# Patient Record
Sex: FEMALE | Race: BLACK OR AFRICAN AMERICAN | NOT HISPANIC OR LATINO | URBAN - METROPOLITAN AREA
[De-identification: names, ages, dates, MRNs, and addresses within clinical notes are randomized per-mention and may not be internally consistent; named-entity substitution may affect disease eponyms.]

---

## 2017-03-29 ENCOUNTER — INPATIENT (INPATIENT)
Facility: HOSPITAL | Age: 47
LOS: 5 days | Discharge: LEFT BEFORE DISCHARGE | DRG: 66 | End: 2017-04-04
Attending: HOSPITALIST | Admitting: HOSPITALIST
Payer: COMMERCIAL

## 2017-03-29 VITALS
TEMPERATURE: 99 F | RESPIRATION RATE: 20 BRPM | OXYGEN SATURATION: 99 % | DIASTOLIC BLOOD PRESSURE: 86 MMHG | SYSTOLIC BLOOD PRESSURE: 179 MMHG | HEART RATE: 79 BPM | HEIGHT: 67 IN | WEIGHT: 207.01 LBS

## 2017-03-29 LAB
ALBUMIN SERPL ELPH-MCNC: 4.2 G/DL — SIGNIFICANT CHANGE UP (ref 3.3–5.2)
ALP SERPL-CCNC: 75 U/L — SIGNIFICANT CHANGE UP (ref 40–120)
ALT FLD-CCNC: 13 U/L — SIGNIFICANT CHANGE UP
ANION GAP SERPL CALC-SCNC: 11 MMOL/L — SIGNIFICANT CHANGE UP (ref 5–17)
ANISOCYTOSIS BLD QL: SLIGHT — SIGNIFICANT CHANGE UP
AST SERPL-CCNC: 12 U/L — SIGNIFICANT CHANGE UP
BASOPHILS # BLD AUTO: 0 K/UL — SIGNIFICANT CHANGE UP (ref 0–0.2)
BASOPHILS NFR BLD AUTO: 0.2 % — SIGNIFICANT CHANGE UP (ref 0–2)
BILIRUB SERPL-MCNC: 0.2 MG/DL — LOW (ref 0.4–2)
BUN SERPL-MCNC: 12 MG/DL — SIGNIFICANT CHANGE UP (ref 8–20)
CALCIUM SERPL-MCNC: 9.4 MG/DL — SIGNIFICANT CHANGE UP (ref 8.6–10.2)
CHLORIDE SERPL-SCNC: 97 MMOL/L — LOW (ref 98–107)
CO2 SERPL-SCNC: 26 MMOL/L — SIGNIFICANT CHANGE UP (ref 22–29)
CREAT SERPL-MCNC: 0.53 MG/DL — SIGNIFICANT CHANGE UP (ref 0.5–1.3)
DACRYOCYTES BLD QL SMEAR: SLIGHT — SIGNIFICANT CHANGE UP
ELLIPTOCYTES BLD QL SMEAR: SLIGHT — SIGNIFICANT CHANGE UP
EOSINOPHIL # BLD AUTO: 0.2 K/UL — SIGNIFICANT CHANGE UP (ref 0–0.5)
EOSINOPHIL NFR BLD AUTO: 2.5 % — SIGNIFICANT CHANGE UP (ref 0–6)
ETHANOL SERPL-MCNC: <10 MG/DL — SIGNIFICANT CHANGE UP
GLUCOSE SERPL-MCNC: 282 MG/DL — HIGH (ref 70–115)
HCT VFR BLD CALC: 33 % — LOW (ref 37–47)
HGB BLD-MCNC: 9.1 G/DL — LOW (ref 12–16)
HYPOCHROMIA BLD QL: SLIGHT — SIGNIFICANT CHANGE UP
LYMPHOCYTES # BLD AUTO: 2.4 K/UL — SIGNIFICANT CHANGE UP (ref 1–4.8)
LYMPHOCYTES # BLD AUTO: 29.7 % — SIGNIFICANT CHANGE UP (ref 20–55)
MACROCYTES BLD QL: SLIGHT — SIGNIFICANT CHANGE UP
MCHC RBC-ENTMCNC: 19 PG — LOW (ref 27–31)
MCHC RBC-ENTMCNC: 27.6 G/DL — LOW (ref 32–36)
MCV RBC AUTO: 68.8 FL — LOW (ref 81–99)
MICROCYTES BLD QL: SLIGHT — SIGNIFICANT CHANGE UP
MONOCYTES # BLD AUTO: 0.4 K/UL — SIGNIFICANT CHANGE UP (ref 0–0.8)
MONOCYTES NFR BLD AUTO: 5.1 % — SIGNIFICANT CHANGE UP (ref 3–10)
NEUTROPHILS # BLD AUTO: 5 K/UL — SIGNIFICANT CHANGE UP (ref 1.8–8)
NEUTROPHILS NFR BLD AUTO: 62.3 % — SIGNIFICANT CHANGE UP (ref 37–73)
OVALOCYTES BLD QL SMEAR: SLIGHT — SIGNIFICANT CHANGE UP
PLAT MORPH BLD: NORMAL — SIGNIFICANT CHANGE UP
PLATELET # BLD AUTO: 336 K/UL — SIGNIFICANT CHANGE UP (ref 150–400)
POIKILOCYTOSIS BLD QL AUTO: SLIGHT — SIGNIFICANT CHANGE UP
POLYCHROMASIA BLD QL SMEAR: SLIGHT — SIGNIFICANT CHANGE UP
POTASSIUM SERPL-MCNC: 3.9 MMOL/L — SIGNIFICANT CHANGE UP (ref 3.5–5.3)
POTASSIUM SERPL-SCNC: 3.9 MMOL/L — SIGNIFICANT CHANGE UP (ref 3.5–5.3)
PROT SERPL-MCNC: 8.1 G/DL — SIGNIFICANT CHANGE UP (ref 6.6–8.7)
RBC # BLD: 4.8 M/UL — SIGNIFICANT CHANGE UP (ref 4.4–5.2)
RBC # FLD: 19.1 % — HIGH (ref 11–15.6)
RBC BLD AUTO: ABNORMAL
SODIUM SERPL-SCNC: 134 MMOL/L — LOW (ref 135–145)
WBC # BLD: 8.1 K/UL — SIGNIFICANT CHANGE UP (ref 4.8–10.8)
WBC # FLD AUTO: 8.1 K/UL — SIGNIFICANT CHANGE UP (ref 4.8–10.8)

## 2017-03-29 RX ORDER — MORPHINE SULFATE 50 MG/1
4 CAPSULE, EXTENDED RELEASE ORAL ONCE
Qty: 0 | Refills: 0 | Status: DISCONTINUED | OUTPATIENT
Start: 2017-03-29 | End: 2017-03-29

## 2017-03-29 RX ADMIN — MORPHINE SULFATE 4 MILLIGRAM(S): 50 CAPSULE, EXTENDED RELEASE ORAL at 23:00

## 2017-03-29 RX ADMIN — MORPHINE SULFATE 4 MILLIGRAM(S): 50 CAPSULE, EXTENDED RELEASE ORAL at 22:35

## 2017-03-29 NOTE — ED ADULT NURSE NOTE - OBJECTIVE STATEMENT
pt A&Ox1- oriented to self, pt A&Ox1- oriented to self, sister at bedside, stated pt has had a headache and trouble talking x1week and was off balance walking yesterday, pt c/o left sided headache 10/10, resp even and unlabored no distress noted,

## 2017-03-29 NOTE — ED ADULT TRIAGE NOTE - CHIEF COMPLAINT QUOTE
pt biba c/o slurred speech and blurred vision x1week. Pt also c/o left sided HA. Pt with hx of HTN, DM, and migraines. Pt presents confused and having difficulty formulating sentences. Pt sister at bedside and states this is not normal behavior and has been getting progressively worse over the past week. .

## 2017-03-29 NOTE — ED PROVIDER NOTE - NS ED MD SCRIBE ATTENDING SCRIBE SECTIONS
PAST MEDICAL/SURGICAL/SOCIAL HISTORY/DISPOSITION/HIV/VITAL SIGNS( Pullset)/HISTORY OF PRESENT ILLNESS/REVIEW OF SYSTEMS/PHYSICAL EXAM/INTAKE ASSESSMENT/SCREENINGS

## 2017-03-29 NOTE — ED PROVIDER NOTE - OBJECTIVE STATEMENT
This is a 48 y/o F with hx of HTN, DM, and GERD complaining of dysarthria, gait abnormality, and HA x 7 days. Pt's friend states that for 7 days pt has not been able to speak in full sentences. Pt will often get frustrated because she is unable to form a full sentence. Pt denies head trauma. Pt states that she has not been compliant with her medication x 3-4 days. Pt's friend also states that alison pt walks she drifts to the lift.

## 2017-03-30 ENCOUNTER — TRANSCRIPTION ENCOUNTER (OUTPATIENT)
Age: 47
End: 2017-03-30

## 2017-03-30 DIAGNOSIS — H53.47 HETERONYMOUS BILATERAL FIELD DEFECTS: ICD-10-CM

## 2017-03-30 DIAGNOSIS — E11.8 TYPE 2 DIABETES MELLITUS WITH UNSPECIFIED COMPLICATIONS: ICD-10-CM

## 2017-03-30 DIAGNOSIS — I63.10 CEREBRAL INFARCTION DUE TO EMBOLISM OF UNSPECIFIED PRECEREBRAL ARTERY: ICD-10-CM

## 2017-03-30 DIAGNOSIS — I10 ESSENTIAL (PRIMARY) HYPERTENSION: ICD-10-CM

## 2017-03-30 DIAGNOSIS — I63.9 CEREBRAL INFARCTION, UNSPECIFIED: ICD-10-CM

## 2017-03-30 DIAGNOSIS — I63.8 OTHER CEREBRAL INFARCTION: ICD-10-CM

## 2017-03-30 DIAGNOSIS — D64.9 ANEMIA, UNSPECIFIED: ICD-10-CM

## 2017-03-30 DIAGNOSIS — K21.9 GASTRO-ESOPHAGEAL REFLUX DISEASE WITHOUT ESOPHAGITIS: ICD-10-CM

## 2017-03-30 DIAGNOSIS — R01.1 CARDIAC MURMUR, UNSPECIFIED: ICD-10-CM

## 2017-03-30 LAB — HBA1C BLD-MCNC: 10.4 % — HIGH (ref 4–5.6)

## 2017-03-30 PROCEDURE — 70450 CT HEAD/BRAIN W/O DYE: CPT | Mod: 26

## 2017-03-30 PROCEDURE — 99222 1ST HOSP IP/OBS MODERATE 55: CPT

## 2017-03-30 PROCEDURE — 93010 ELECTROCARDIOGRAM REPORT: CPT

## 2017-03-30 PROCEDURE — 93880 EXTRACRANIAL BILAT STUDY: CPT | Mod: 26

## 2017-03-30 PROCEDURE — 93306 TTE W/DOPPLER COMPLETE: CPT | Mod: 26

## 2017-03-30 PROCEDURE — 99285 EMERGENCY DEPT VISIT HI MDM: CPT

## 2017-03-30 RX ORDER — SODIUM CHLORIDE 9 MG/ML
1000 INJECTION, SOLUTION INTRAVENOUS
Qty: 0 | Refills: 0 | Status: DISCONTINUED | OUTPATIENT
Start: 2017-03-30 | End: 2017-04-04

## 2017-03-30 RX ORDER — DEXTROSE 50 % IN WATER 50 %
12.5 SYRINGE (ML) INTRAVENOUS ONCE
Qty: 0 | Refills: 0 | Status: DISCONTINUED | OUTPATIENT
Start: 2017-03-30 | End: 2017-04-04

## 2017-03-30 RX ORDER — ACETAMINOPHEN 500 MG
325 TABLET ORAL EVERY 6 HOURS
Qty: 0 | Refills: 0 | Status: DISCONTINUED | OUTPATIENT
Start: 2017-03-30 | End: 2017-03-31

## 2017-03-30 RX ORDER — SODIUM CHLORIDE 9 MG/ML
3 INJECTION INTRAMUSCULAR; INTRAVENOUS; SUBCUTANEOUS EVERY 8 HOURS
Qty: 0 | Refills: 0 | Status: DISCONTINUED | OUTPATIENT
Start: 2017-03-30 | End: 2017-04-04

## 2017-03-30 RX ORDER — ACETAMINOPHEN 500 MG
650 TABLET ORAL EVERY 6 HOURS
Qty: 0 | Refills: 0 | Status: DISCONTINUED | OUTPATIENT
Start: 2017-03-30 | End: 2017-03-30

## 2017-03-30 RX ORDER — MORPHINE SULFATE 50 MG/1
4 CAPSULE, EXTENDED RELEASE ORAL ONCE
Qty: 0 | Refills: 0 | Status: DISCONTINUED | OUTPATIENT
Start: 2017-03-30 | End: 2017-03-30

## 2017-03-30 RX ORDER — DEXTROSE 50 % IN WATER 50 %
25 SYRINGE (ML) INTRAVENOUS ONCE
Qty: 0 | Refills: 0 | Status: DISCONTINUED | OUTPATIENT
Start: 2017-03-30 | End: 2017-04-04

## 2017-03-30 RX ORDER — DEXTROSE 50 % IN WATER 50 %
1 SYRINGE (ML) INTRAVENOUS ONCE
Qty: 0 | Refills: 0 | Status: DISCONTINUED | OUTPATIENT
Start: 2017-03-30 | End: 2017-04-04

## 2017-03-30 RX ORDER — PANTOPRAZOLE SODIUM 20 MG/1
40 TABLET, DELAYED RELEASE ORAL
Qty: 0 | Refills: 0 | Status: DISCONTINUED | OUTPATIENT
Start: 2017-03-30 | End: 2017-04-04

## 2017-03-30 RX ORDER — GLUCAGON INJECTION, SOLUTION 0.5 MG/.1ML
1 INJECTION, SOLUTION SUBCUTANEOUS ONCE
Qty: 0 | Refills: 0 | Status: DISCONTINUED | OUTPATIENT
Start: 2017-03-30 | End: 2017-04-04

## 2017-03-30 RX ORDER — INSULIN LISPRO 100/ML
VIAL (ML) SUBCUTANEOUS
Qty: 0 | Refills: 0 | Status: DISCONTINUED | OUTPATIENT
Start: 2017-03-30 | End: 2017-03-31

## 2017-03-30 RX ORDER — ASPIRIN/CALCIUM CARB/MAGNESIUM 324 MG
325 TABLET ORAL DAILY
Qty: 0 | Refills: 0 | Status: DISCONTINUED | OUTPATIENT
Start: 2017-03-30 | End: 2017-04-04

## 2017-03-30 RX ORDER — ATORVASTATIN CALCIUM 80 MG/1
20 TABLET, FILM COATED ORAL AT BEDTIME
Qty: 0 | Refills: 0 | Status: DISCONTINUED | OUTPATIENT
Start: 2017-03-30 | End: 2017-04-04

## 2017-03-30 RX ADMIN — Medication: at 09:14

## 2017-03-30 RX ADMIN — Medication: at 20:05

## 2017-03-30 RX ADMIN — Medication 650 MILLIGRAM(S): at 10:26

## 2017-03-30 RX ADMIN — SODIUM CHLORIDE 3 MILLILITER(S): 9 INJECTION INTRAMUSCULAR; INTRAVENOUS; SUBCUTANEOUS at 22:36

## 2017-03-30 RX ADMIN — SODIUM CHLORIDE 3 MILLILITER(S): 9 INJECTION INTRAMUSCULAR; INTRAVENOUS; SUBCUTANEOUS at 05:35

## 2017-03-30 RX ADMIN — ATORVASTATIN CALCIUM 20 MILLIGRAM(S): 80 TABLET, FILM COATED ORAL at 22:36

## 2017-03-30 RX ADMIN — MORPHINE SULFATE 4 MILLIGRAM(S): 50 CAPSULE, EXTENDED RELEASE ORAL at 01:53

## 2017-03-30 RX ADMIN — PANTOPRAZOLE SODIUM 40 MILLIGRAM(S): 20 TABLET, DELAYED RELEASE ORAL at 09:14

## 2017-03-30 RX ADMIN — MORPHINE SULFATE 4 MILLIGRAM(S): 50 CAPSULE, EXTENDED RELEASE ORAL at 02:49

## 2017-03-30 RX ADMIN — Medication 650 MILLIGRAM(S): at 06:56

## 2017-03-30 RX ADMIN — Medication 650 MILLIGRAM(S): at 10:56

## 2017-03-30 RX ADMIN — Medication: at 12:34

## 2017-03-30 RX ADMIN — SODIUM CHLORIDE 3 MILLILITER(S): 9 INJECTION INTRAMUSCULAR; INTRAVENOUS; SUBCUTANEOUS at 16:59

## 2017-03-30 RX ADMIN — Medication 325 MILLIGRAM(S): at 17:00

## 2017-03-30 RX ADMIN — Medication 650 MILLIGRAM(S): at 08:51

## 2017-03-30 NOTE — PROGRESS NOTE ADULT - PROBLEM SELECTOR PLAN 1
-c/w stroke protocol  -c/w telemetry thus far no arrythmias observed   -pt is able to swallow without any complications   -c/w asa/statin   -d/w neurologist Dr. Moore plan of care will f/u MRI brain, MRA head and neck/  Carotid U/S  -Cardiology consult noted and appreciated will f/u TTE and pt will likely need TOLU  Speech evaluation ordered

## 2017-03-30 NOTE — CONSULT NOTE ADULT - ASSESSMENT
1. 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 CT also shows old right parietal infarct. Likely related to diabetes, hypertension and possible non-compliance.  2. Cardiac embolic source is less likely but pt is young and has had at least 2 cva's in different distributions-will need TOLU to exclude CSE. Clinically pt is in sinus rhythm currently.  3. anemia-probably chronic  4.aodm  5. hx of hpt.

## 2017-03-30 NOTE — CONSULT NOTE ADULT - ASSESSMENT
46y/o female with HTN, uncontrolled diabetes presenting with CVA; left parietal infarct  aphasia    Continue PT with transfer, balance and gait training  OT pending  ST pending  Continue work up and medical management  Will follow to assess for disposition.  Pt would likely benefit from a short course of acute rehabilitation

## 2017-03-30 NOTE — PHYSICAL THERAPY INITIAL EVALUATION ADULT - ADDITIONAL COMMENTS
Pt lives with sister in an apartment with no steps to enter.  Independent with all PTA, without devices.

## 2017-03-30 NOTE — PROGRESS NOTE ADULT - ASSESSMENT
47 F with PMH HTN, DM II and anemia presented with expressive aphasia, headache and unsteady gait admitted with CVA -multiple left hemispheric infarcts on CT (recent left parietal infarct and an old right posterior parietal infarct) and physical exam with persistent expressive aphasia.

## 2017-03-30 NOTE — CONSULT NOTE ADULT - SUBJECTIVE AND OBJECTIVE BOX
HPI:  Pt is a 48 y/o female with h/o DM II, HTN, was noticed by family that she is unable to express her self in clear words and she leans to the left whenever she walked for the last week. CT brain revealed a recent left parietal infarct and an old right posterior parietal infarct.  Pt c/o left sided temporal headache.  No weakness  in the ER, patient has an expressive aphasia. o/e: systolic heart murmur. hgb: 9.1    PT/OT EVALUATION:  BED MOBILITY:  Independent  TRANSFERS: cg/min assist  GAIT: cg/SBA x 50' RW  ADLS: Assist    PAST MEDICAL & SURGICAL HISTORY:  Acid reflux  HTN (hypertension)  Diabetes    No significant past surgical history      FAMILY HISTORY:  No pertinent family history in first degree relatives      SOCIAL HISTORY:  TOBACCO: denies history  ALCOHOL: denies abuse  IVDA: denies history    FUNCTIONAL, ENVIRONMENTAL HISTORY:  Pt resides with her sister in an elevated apt with a ramp to enter.    FUNCTIONAL HISTORY: independent with ambulation and ADLs    Allergies    No Known Allergies    Intolerances        MEDICATIONS  (STANDING):  sodium chloride 0.9% lock flush 3milliLiter(s) IV Push every 8 hours  pantoprazole    Tablet 40milliGRAM(s) Oral before breakfast  atorvastatin 20milliGRAM(s) Oral at bedtime  aspirin enteric coated 325milliGRAM(s) Oral daily  insulin lispro (HumaLOG) corrective regimen sliding scale  SubCutaneous three times a day before meals  dextrose 5%. 1000milliLiter(s) IV Continuous <Continuous>  dextrose 50% Injectable 12.5Gram(s) IV Push once  dextrose 50% Injectable 25Gram(s) IV Push once  dextrose 50% Injectable 25Gram(s) IV Push once    MEDICATIONS  (PRN):  acetaminophen   Tablet. 650milliGRAM(s) Oral every 6 hours PRN Mild Pain (1 - 3)  dextrose Gel 1Dose(s) Oral once PRN Blood Glucose LESS THAN 70 milliGRAM(s)/deciliter  glucagon  Injectable 1milliGRAM(s) IntraMuscular once PRN Glucose LESS THAN 70 milligrams/deciliter  oxyCODONE  5 mG/acetaminophen 325 mG 1Tablet(s) Oral every 8 hours PRN Severe Pain (7 - 10)      REVIEW OF SYSTEMS:    CONSTITUTIONAL: No fever, weight loss, or fatigue  + left-sided temporal headach  EYES: No eye pain, visual disturbances, or discharge  ENMT:  No difficulty hearing, tinnitus, vertigo; No sinus or throat pain  NECK: No pain or stiffness  RESPIRATORY: No cough, wheezing, chills or hemoptysis; No shortness of breath  CARDIOVASCULAR: No chest pain, palpitations, dizziness, or leg swelling  GASTROINTESTINAL: No abdominal or epigastric pain. No nausea, vomiting, or hematemesis; No diarrhea or constipation. No melena or hematochezia.  GENITOURINARY: No dysuria, frequency, hematuria, or incontinence  NEUROLOGICAL: No headaches, memory loss, loss of strength, numbness, or tremors  SKIN: No itching, burning, rashes, or lesions   LYMPH NODES: No enlarged glands  ENDOCRINE: No heat or cold intolerance; No hair loss  MUSCULOSKELETAL: No joint pain or swelling; No muscle, back, or extremity pain  PSYCHIATRIC: No depression, anxiety, mood swings, or difficulty sleeping  HEME/LYMPH: No easy bruising, or bleeding gums  ALLERGY AND IMMUNOLOGIC: No hives or eczema    Vital Signs Last 24 Hrs  T(C): 36.7, Max: 37.1 (03-29 @ 21:20)  T(F): 98, Max: 98.7 (03-29 @ 21:20)  HR: 76 (72 - 79)  BP: 139/70 (139/70 - 179/86)  BP(mean): --  RR: 16 (12 - 20)  SpO2: 100% (97% - 100%)    PHYSICAL EXAM:    GENERAL: NAD, well-groomed, well-developed  HEAD:  Atraumatic, Normocephalic  EYES: EOMI, PERRLA, conjunctiva and sclera clear  ENMT: No tonsillar erythema, exudates, or enlargement  NECK: Supple  HEART: Regular rate and rhythm  CHEST/LUNG: Clear to auscultation bilaterally; No rales, rhonchi, wheezing, or rubs  ABDOMEN: Soft, Nontender, Nondistended; Bowel sounds present  EXTREMITIES:  No edema.  Calves soft and NTTP  SKIN: No rashes or lesions  NERVOUS SYSTEM:  Alert & Oriented X3  +expressive>receptive aphasia.  Pt had slight difficulty following commands  CNs II-XII grossly intact  Motor Strength 5/5 B/L upper and lower extremities.  Neg pronator drift  Sensation appeared intact to light touch symmetrically  DTRs 2+ intact and symmetric      LABS:                        9.1    8.1   )-----------( 336      ( 29 Mar 2017 21:55 )             33.0     29 Mar 2017 21:55    134    |  97     |  12.0   ----------------------------<  282    3.9     |  26.0   |  0.53     Ca    9.4        29 Mar 2017 21:55    TPro  8.1    /  Alb  4.2    /  TBili  0.2    /  DBili  x      /  AST  12     /  ALT  13     /  AlkPhos  75     29 Mar 2017 21:55          RADIOLOGY & ADDITIONAL STUDIES:

## 2017-03-30 NOTE — CONSULT NOTE ADULT - SUBJECTIVE AND OBJECTIVE BOX
Chief Complaint: 46 yo female (hx from sister and chart) with one week hx of speech problems    HPI: Pt noted difficulty speaking-unintelligible for 1 week assoc with severe left sided headache. Eventually came to ER where an acute cva dx'd. CT alos showed a contralateral ofd cva altho pt has no hx of cva. Hx of aodm and hypertension but pt "ran out of meds" recently. No hx of prior cva/syncope/seizure/renal/pulmonary/hepatic/heme/thyroid or cardiac hx (ie mi cp palpitations. No surgical hx. Light smoker. No etoh or substance abuse. No allergy. ROS according to sister otherwise neg.    PAST MEDICAL & SURGICAL HISTORY:  Acid reflux  HTN (hypertension)  Diabetes  No pertinent past medical history  No significant past surgical history      PREVIOUS DIAGNOSTIC TESTING:      ECHO  FINDINGS:    STRESS  FINDINGS:    CATHETERIZATION  FINDINGS:    MEDICATIONS  (STANDING):  sodium chloride 0.9% lock flush 3milliLiter(s) IV Push every 8 hours  pantoprazole    Tablet 40milliGRAM(s) Oral before breakfast  atorvastatin 20milliGRAM(s) Oral at bedtime  aspirin enteric coated 325milliGRAM(s) Oral daily  insulin lispro (HumaLOG) corrective regimen sliding scale  SubCutaneous three times a day before meals  dextrose 5%. 1000milliLiter(s) IV Continuous <Continuous>  dextrose 50% Injectable 12.5Gram(s) IV Push once  dextrose 50% Injectable 25Gram(s) IV Push once  dextrose 50% Injectable 25Gram(s) IV Push once    MEDICATIONS  (PRN):  acetaminophen   Tablet. 650milliGRAM(s) Oral every 6 hours PRN Mild Pain (1 - 3)  dextrose Gel 1Dose(s) Oral once PRN Blood Glucose LESS THAN 70 milliGRAM(s)/deciliter  glucagon  Injectable 1milliGRAM(s) IntraMuscular once PRN Glucose LESS THAN 70 milligrams/deciliter  oxyCODONE  5 mG/acetaminophen 325 mG 1Tablet(s) Oral every 8 hours PRN Severe Pain (7 - 10)      FAMILY HISTORY:  No pertinent family history in first degree relatives      SOCIAL HISTORY:    CIGARETTES:    ALCOHOL:    ROS: Negative other than as mentioned in HPI.    Vital Signs Last 24 Hrs  T(C): 36.7, Max: 37.1 (03-29 @ 21:20)  T(F): 98, Max: 98.7 (03-29 @ 21:20)  HR: 76 (72 - 79)  BP: 139/70 (139/70 - 179/86)  BP(mean): --  RR: 16 (12 - 20)  SpO2: 100% (97% - 100%)    PHYSICAL EXAM:  General: Appears well developed, well nourished alert and cooperative.  HEENT: Head; normocephalic, atraumatic.  Eyes;   Pupils reactive, cornea wnl.  Neck; Supple, no nodes adenopathy, no NVD or carotid bruit or thyromegaly.  CARDIOVASCULAR; No murmur, rub, gallop or lift. Normal S1 and S2.  LUNGS; No rales, rhonchi or wheeze. Normal breath sounds bilaterally.  ABDOMEN ; Soft, nontender without mass or organomegaly. bowel sounds normoactive.  EXTREMITIES; No clubbing, cyanosis or edema. Distal pulses wnl. ROM normal.  SKIN; warm and dry with normal turgor.  NEURO; Alert/oriented x 3/normal motor exam. No pathologic reflexes.    PSYCH; normal affect.            INTERPRETATION OF TELEMETRY:    ECG:    I&O's Detail      LABS:                        9.1    8.1   )-----------( 336      ( 29 Mar 2017 21:55 )             33.0     29 Mar 2017 21:55    134    |  97     |  12.0   ----------------------------<  282    3.9     |  26.0   |  0.53     Ca    9.4        29 Mar 2017 21:55    TPro  8.1    /  Alb  4.2    /  TBili  0.2    /  DBili  x      /  AST  12     /  ALT  13     /  AlkPhos  75     29 Mar 2017 21:55            I&O's Summary      RADIOLOGY & ADDITIONAL STUDIES: Chief Complaint: 48 yo female (hx from sister and chart) with one week hx of speech problems    HPI: Pt noted difficulty speaking-unintelligible for 1 week assoc with severe left sided headache. Eventually came to ER where an acute cva dx'd. CT alos showed a contralateral ofd cva altho pt has no hx of cva. Hx of aodm and hypertension but pt "ran out of meds" recently. No hx of prior cva/syncope/seizure/renal/pulmonary/hepatic/heme/thyroid or cardiac hx (ie mi cp palpitations. No surgical hx. Light smoker. No etoh or substance abuse. No allergy. ROS according to sister otherwise neg. OP meds not recorded.    PAST MEDICAL & SURGICAL HISTORY:  Acid reflux  HTN (hypertension)  Diabetes  No pertinent past medical history  No significant past surgical history      PREVIOUS DIAGNOSTIC TESTING:      ECHO  FINDINGS:    STRESS  FINDINGS:    CATHETERIZATION  FINDINGS:    MEDICATIONS  (STANDING):  sodium chloride 0.9% lock flush 3milliLiter(s) IV Push every 8 hours  pantoprazole    Tablet 40milliGRAM(s) Oral before breakfast  atorvastatin 20milliGRAM(s) Oral at bedtime  aspirin enteric coated 325milliGRAM(s) Oral daily  insulin lispro (HumaLOG) corrective regimen sliding scale  SubCutaneous three times a day before meals  dextrose 5%. 1000milliLiter(s) IV Continuous <Continuous>  dextrose 50% Injectable 12.5Gram(s) IV Push once  dextrose 50% Injectable 25Gram(s) IV Push once  dextrose 50% Injectable 25Gram(s) IV Push once    MEDICATIONS  (PRN):  acetaminophen   Tablet. 650milliGRAM(s) Oral every 6 hours PRN Mild Pain (1 - 3)  dextrose Gel 1Dose(s) Oral once PRN Blood Glucose LESS THAN 70 milliGRAM(s)/deciliter  glucagon  Injectable 1milliGRAM(s) IntraMuscular once PRN Glucose LESS THAN 70 milligrams/deciliter  oxyCODONE  5 mG/acetaminophen 325 mG 1Tablet(s) Oral every 8 hours PRN Severe Pain (7 - 10)      FAMILY HISTORY:  No pertinent family history in first degree relatives      SOCIAL HISTORY: lives with sister    CIGARETTES: several a day.    ALCOHOL: infrequent    ROS: Negative other than as mentioned in HPI.    Vital Signs Last 24 Hrs  T(C): 36.7, Max: 37.1 (03-29 @ 21:20)  T(F): 98, Max: 98.7 (03-29 @ 21:20)  HR: 76 (72 - 79)  BP: 140/75 manually la. (139/70 - 179/86)  BP(mean): --  RR: 14 flat (12 - 20)  SpO2: 100% (97% - 100%)    PHYSICAL EXAM:  General: Appears well developed, well nourished alert and cooperative. obese middle aged Female. Follows oral commands well but can't articulate or speak coherently  HEENT: Head; normocephalic, atraumatic. Dentition poor  Eyes;   Pupils reactive, cornea wnl.  Neck; Supple, no nodes adenopathy, no NVD or carotid bruit or thyromegaly.  CARDIOVASCULAR; No murmur, rub, gallop or lift. Normal S1 and S2.  LUNGS; No rales, rhonchi or wheeze. Normal breath sounds bilaterally.  ABDOMEN ; Soft, nontender without mass or organomegaly. bowel sounds normoactive.  EXTREMITIES; No clubbing, cyanosis or edema. Distal pulses wnl. ROM normal.  SKIN; warm and dry with normal turgor.  NEURO; Alert responds appropriately but can't speak clearly or fluently. Moves all extremities well. Cranials intact.  PSYCH; can't assess.            INTERPRETATION OF TELEMETRY:    ECG: pending  cxr not done  I&O's Detail      LABS:                        9.1    8.1   )-----------( 336      ( 29 Mar 2017 21:55 )             33.0     29 Mar 2017 21:55    134    |  97     |  12.0   ----------------------------<  282    3.9     |  26.0   |  0.53     Ca    9.4        29 Mar 2017 21:55    TPro  8.1    /  Alb  4.2    /  TBili  0.2    /  DBili  x      /  AST  12     /  ALT  13     /  AlkPhos  75     29 Mar 2017 21:55    head CT-new left parietal infarct and old right parietal infarct  carotid duplex-prelim report-distal left ICA disease or  occlusion.        I&O's Summary      RADIOLOGY & ADDITIONAL STUDIES:

## 2017-03-30 NOTE — H&P ADULT - HISTORY OF PRESENT ILLNESS
48 y/o female with h/oDM II, HTN, was noticed by family that she is unable to express her self in clear words and she leans to the left whenevr she walks for the last week. CT brain shows a recent left parietal infarct and an old right posterior parietal infarct. c/o left side headache.no price weakness  in the ER, patient has an expressive aphasia. o/e: systolic heart murmur. hgb: 9.1

## 2017-03-30 NOTE — PROGRESS NOTE ADULT - SUBJECTIVE AND OBJECTIVE BOX
Patient is a 47y old  Female who presents with a chief complaint of unable to speak properly x1 week (30 Mar 2017 05:24)      HEALTH ISSUES - PROBLEM Dx:  Hemianopsia: Hemianopsia  Aphasia complicating stroke: Aphasia complicating stroke  Cerebrovascular accident (CVA) due to other mechanism: Cerebrovascular accident (CVA) due to other mechanism  Anemia, unspecified type: Anemia, unspecified type  Gastroesophageal reflux disease without esophagitis: Gastroesophageal reflux disease without esophagitis  Heart murmur: Heart murmur  Essential hypertension: Essential hypertension  Type 2 diabetes mellitus with complication, without long-term current use of insulin: Type 2 diabetes mellitus with complication, without long-term current use of insulin  Cerebrovascular accident (CVA) due to embolism of precerebral artery: Cerebrovascular accident (CVA) due to embolism of precerebral artery      INTERVAL HPI/OVERNIGHT EVENTS:  Patient seen and examined at bedside. No acute events overnight. Patient     Vital Signs Last 24 Hrs  T(C): 36.7, Max: 37.1 (03-29 @ 21:20)  T(F): 98, Max: 98.7 (03-29 @ 21:20)  HR: 76 (72 - 79)  BP: 139/70 (139/70 - 179/86)  BP(mean): --  RR: 16 (12 - 20)  SpO2: 100% (97% - 100%)    CAPILLARY BLOOD GLUCOSE  369 (30 Mar 2017 11:15)  260 (29 Mar 2017 21:20)      I&O's Summary      CONSTITUTIONAL: Well appearing, well nourished, awake, alert and in no apparent distress  ENMT: Airway patent, Nasal mucosa clear. Mouth with normal mucosa. Throat has no vesicles, no oropharyngeal exudates and uvula is midline.  EYES: Clear bilaterally, pupils equal, round and reactive to light. EOMI.  CARDIAC: Normal rate, regular rhythm.  Heart sounds S1, S2.  No murmurs, rubs or gallops   RESPIRATORY: Breath sounds clear and equal bilaterally. No wheezes, rhales or rhonchi  MUSCULOSKELETAL: Spine appears normal, range of motion is not limited, no muscle or joint tenderness  EXTREMITIES: No edema, cyanosis or deformity   NEUROLOGICAL: Alert and oriented, no focal deficits, no motor or sensory deficits.  SKIN: No rash, skin turgor     MEDICATIONS  (STANDING):  sodium chloride 0.9% lock flush 3milliLiter(s) IV Push every 8 hours  pantoprazole    Tablet 40milliGRAM(s) Oral before breakfast  atorvastatin 20milliGRAM(s) Oral at bedtime  aspirin enteric coated 325milliGRAM(s) Oral daily  insulin lispro (HumaLOG) corrective regimen sliding scale  SubCutaneous three times a day before meals  dextrose 5%. 1000milliLiter(s) IV Continuous <Continuous>  dextrose 50% Injectable 12.5Gram(s) IV Push once  dextrose 50% Injectable 25Gram(s) IV Push once  dextrose 50% Injectable 25Gram(s) IV Push once    MEDICATIONS  (PRN):  acetaminophen   Tablet. 650milliGRAM(s) Oral every 6 hours PRN Mild Pain (1 - 3)  dextrose Gel 1Dose(s) Oral once PRN Blood Glucose LESS THAN 70 milliGRAM(s)/deciliter  glucagon  Injectable 1milliGRAM(s) IntraMuscular once PRN Glucose LESS THAN 70 milligrams/deciliter  oxyCODONE  5 mG/acetaminophen 325 mG 1Tablet(s) Oral every 8 hours PRN Severe Pain (7 - 10)      LABS:                        9.1    8.1   )-----------( 336      ( 29 Mar 2017 21:55 )             33.0     29 Mar 2017 21:55    134    |  97     |  12.0   ----------------------------<  282    3.9     |  26.0   |  0.53     Ca    9.4        29 Mar 2017 21:55    TPro  8.1    /  Alb  4.2    /  TBili  0.2    /  DBili  x      /  AST  12     /  ALT  13     /  AlkPhos  75     29 Mar 2017 21:55        LIVER FUNCTIONS - ( 29 Mar 2017 21:55 )  Alb: 4.2 g/dL / Pro: 8.1 g/dL / ALK PHOS: 75 U/L / ALT: 13 U/L / AST: 12 U/L / GGT: x             RADIOLOGY & ADDITIONAL TESTS:

## 2017-03-30 NOTE — CONSULT NOTE ADULT - SUBJECTIVE AND OBJECTIVE BOX
CHIEF COMPLAINT: aphasia    HPI: 47yFemale  46 y/o female with h/oDM II, HTN, was noticed by family that she is unable to express her self in clear words and she leans to the left whenevr she walks for the last week. CT brain shows a recent left parietal infarct and an old right posterior parietal infarct. c/o left side headache.no price weakness  in the ER, patient has an expressive aphasia. o/e: systolic heart murmur. hgb: 9.1  Patient unable to provide infor. Sister in attendence. No h/o CVA/TIA    PAST MEDICAL & SURGICAL HISTORY:  Acid reflux  HTN (hypertension)  Diabetes  No pertinent past medical history  No significant past surgical history    MEDICATIONS  (STANDING):  sodium chloride 0.9% lock flush 3milliLiter(s) IV Push every 8 hours  pantoprazole    Tablet 40milliGRAM(s) Oral before breakfast  atorvastatin 20milliGRAM(s) Oral at bedtime  aspirin enteric coated 325milliGRAM(s) Oral daily  insulin lispro (HumaLOG) corrective regimen sliding scale  SubCutaneous three times a day before meals  dextrose 5%. 1000milliLiter(s) IV Continuous <Continuous>  dextrose 50% Injectable 12.5Gram(s) IV Push once  dextrose 50% Injectable 25Gram(s) IV Push once  dextrose 50% Injectable 25Gram(s) IV Push once    MEDICATIONS  (PRN):  acetaminophen   Tablet. 650milliGRAM(s) Oral every 6 hours PRN Mild Pain (1 - 3)  dextrose Gel 1Dose(s) Oral once PRN Blood Glucose LESS THAN 70 milliGRAM(s)/deciliter  glucagon  Injectable 1milliGRAM(s) IntraMuscular once PRN Glucose LESS THAN 70 milligrams/deciliter    Allergies    No Known Allergies    Intolerances        FAMILY HISTORY:  No pertinent family history in first degree relatives          SOCIAL HISTORY:    Tobacco:  no  Alcohol:  no  Drugs:  no        REVIEW OF SYSTEMS:    Relevant systems are negative except as noted in the chart, HPI, and PMH      VITAL SIGNS:  Vital Signs Last 24 Hrs  T(C): 36.7, Max: 37.1 (03-29 @ 21:20)  T(F): 98, Max: 98.7 (03-29 @ 21:20)  HR: 73 (72 - 79)  BP: 139/70 (139/70 - 179/86)  BP(mean): --  RR: 12 (12 - 20)  SpO2: 97% (97% - 99%)    PHYSICAL EXAMINATION:    General: Well-developed, well nourished, in no acute distress.  Cardiac:  Regular rate and rhythm. No carotid bruits appreciated.  Eyes: Fundoscopic examination was deferred.  Neurologic:  - Mental Status:  Alert, awake, Cooperates with visual commands, not verbal, no speech production  Cranial Nerves II-XII:    II:  Visual acuity is normal for age ; Visual fields are full to threat on left, no blink on right threat; Pupils are equal, round, and reactive to light.  III, IV, VI:  Extraocular movements are intact without nystagmus.  V:  Facial sensation is intact in the V1-V3 distribution bilaterally.  VII:  Face is minimally asymmetric with smile  VIII:  Hearing is intact and symmetric for age  IX, X:  Uvula is midline and soft palate rises symmetrically  XI:  Head turning and shoulder shrug are intact.  XII:  Tongue protrudes in the midline.  - Motor:  Strength is 5/5 throughout.  There is no pronator drift.  Normal muscle bulk and tone throughout. Tends to move the right side less than left  - Reflexes:  2+ and symmetric at the biceps, and  knees.  Plantar responses flexor.  - Sensory:  Intact and symmetric to light touch, and joint-position sense.  - Coordination:  Finger-nose-finger is normal. Rapid alternating hand movements are intact.       LABS:                          9.1    8.1   )-----------( 336      ( 29 Mar 2017 21:55 )             33.0     29 Mar 2017 21:55    134    |  97     |  12.0   ----------------------------<  282    3.9     |  26.0   |  0.53     Ca    9.4        29 Mar 2017 21:55    TPro  8.1    /  Alb  4.2    /  TBili  0.2    /  DBili  x      /  AST  12     /  ALT  13     /  AlkPhos  75     29 Mar 2017 21:55    LIVER FUNCTIONS - ( 29 Mar 2017 21:55 )  Alb: 4.2 g/dL / Pro: 8.1 g/dL / ALK PHOS: 75 U/L / ALT: 13 U/L / AST: 12 U/L / GGT: x             RADIOLOGY & ADDITIONAL STUDIES:    CT No midline shift or intracranial hemorrhage.  Recent left parietal infarct.  Old right medial parietal infarct.     IMPRESSION:    Multiple left hemispheric infarcts on CT. Exam shows significant aphasia and visual field cut.    PLAN:  1. MRI brain, MRA head and neck  2. Carotid U/S  3. Full cardiac eval including possible TOLU  4. Cerebrovascular risk factor managment  5. Antiplatelet Tx for now  6. PT/Rehab, speech and swallow

## 2017-03-31 DIAGNOSIS — Z29.9 ENCOUNTER FOR PROPHYLACTIC MEASURES, UNSPECIFIED: ICD-10-CM

## 2017-03-31 DIAGNOSIS — E11.65 TYPE 2 DIABETES MELLITUS WITH HYPERGLYCEMIA: ICD-10-CM

## 2017-03-31 LAB
CHOLEST SERPL-MCNC: 153 MG/DL — SIGNIFICANT CHANGE UP (ref 110–199)
HDLC SERPL-MCNC: 31 MG/DL — LOW
LIPID PNL WITH DIRECT LDL SERPL: 95 MG/DL — SIGNIFICANT CHANGE UP
TOTAL CHOLESTEROL/HDL RATIO MEASUREMENT: 5 RATIO — SIGNIFICANT CHANGE UP (ref 3.3–7.1)
TRIGL SERPL-MCNC: 135 MG/DL — SIGNIFICANT CHANGE UP (ref 10–200)

## 2017-03-31 PROCEDURE — 99232 SBSQ HOSP IP/OBS MODERATE 35: CPT

## 2017-03-31 PROCEDURE — 99233 SBSQ HOSP IP/OBS HIGH 50: CPT

## 2017-03-31 RX ORDER — MORPHINE SULFATE 50 MG/1
1 CAPSULE, EXTENDED RELEASE ORAL ONCE
Qty: 0 | Refills: 0 | Status: DISCONTINUED | OUTPATIENT
Start: 2017-03-31 | End: 2017-03-31

## 2017-03-31 RX ORDER — ACETAMINOPHEN WITH CODEINE 300MG-30MG
1 TABLET ORAL EVERY 4 HOURS
Qty: 0 | Refills: 0 | Status: DISCONTINUED | OUTPATIENT
Start: 2017-03-31 | End: 2017-03-31

## 2017-03-31 RX ORDER — INSULIN LISPRO 100/ML
VIAL (ML) SUBCUTANEOUS
Qty: 0 | Refills: 0 | Status: DISCONTINUED | OUTPATIENT
Start: 2017-03-31 | End: 2017-04-04

## 2017-03-31 RX ORDER — CYCLOBENZAPRINE HYDROCHLORIDE 10 MG/1
10 TABLET, FILM COATED ORAL THREE TIMES A DAY
Qty: 0 | Refills: 0 | Status: DISCONTINUED | OUTPATIENT
Start: 2017-03-31 | End: 2017-04-04

## 2017-03-31 RX ORDER — HEPARIN SODIUM 5000 [USP'U]/ML
5000 INJECTION INTRAVENOUS; SUBCUTANEOUS EVERY 8 HOURS
Qty: 0 | Refills: 0 | Status: DISCONTINUED | OUTPATIENT
Start: 2017-04-01 | End: 2017-04-04

## 2017-03-31 RX ORDER — INSULIN GLARGINE 100 [IU]/ML
15 INJECTION, SOLUTION SUBCUTANEOUS AT BEDTIME
Qty: 0 | Refills: 0 | Status: DISCONTINUED | OUTPATIENT
Start: 2017-03-31 | End: 2017-04-01

## 2017-03-31 RX ORDER — DIPHENHYDRAMINE HCL 50 MG
25 CAPSULE ORAL ONCE
Qty: 0 | Refills: 0 | Status: COMPLETED | OUTPATIENT
Start: 2017-03-31 | End: 2017-03-31

## 2017-03-31 RX ORDER — ACETAMINOPHEN 500 MG
650 TABLET ORAL EVERY 6 HOURS
Qty: 0 | Refills: 0 | Status: DISCONTINUED | OUTPATIENT
Start: 2017-03-31 | End: 2017-04-04

## 2017-03-31 RX ORDER — INSULIN GLARGINE 100 [IU]/ML
10 INJECTION, SOLUTION SUBCUTANEOUS AT BEDTIME
Qty: 0 | Refills: 0 | Status: DISCONTINUED | OUTPATIENT
Start: 2017-03-31 | End: 2017-03-31

## 2017-03-31 RX ORDER — MORPHINE SULFATE 50 MG/1
2 CAPSULE, EXTENDED RELEASE ORAL EVERY 6 HOURS
Qty: 0 | Refills: 0 | Status: DISCONTINUED | OUTPATIENT
Start: 2017-03-31 | End: 2017-04-04

## 2017-03-31 RX ADMIN — Medication: at 12:32

## 2017-03-31 RX ADMIN — Medication 650 MILLIGRAM(S): at 16:39

## 2017-03-31 RX ADMIN — Medication 1 TABLET(S): at 22:38

## 2017-03-31 RX ADMIN — Medication 25 MILLIGRAM(S): at 15:35

## 2017-03-31 RX ADMIN — MORPHINE SULFATE 1 MILLIGRAM(S): 50 CAPSULE, EXTENDED RELEASE ORAL at 15:55

## 2017-03-31 RX ADMIN — Medication 1 TABLET(S): at 13:00

## 2017-03-31 RX ADMIN — PANTOPRAZOLE SODIUM 40 MILLIGRAM(S): 20 TABLET, DELAYED RELEASE ORAL at 05:44

## 2017-03-31 RX ADMIN — SODIUM CHLORIDE 3 MILLILITER(S): 9 INJECTION INTRAMUSCULAR; INTRAVENOUS; SUBCUTANEOUS at 05:44

## 2017-03-31 RX ADMIN — Medication 1 TABLET(S): at 12:26

## 2017-03-31 RX ADMIN — CYCLOBENZAPRINE HYDROCHLORIDE 10 MILLIGRAM(S): 10 TABLET, FILM COATED ORAL at 17:53

## 2017-03-31 RX ADMIN — CYCLOBENZAPRINE HYDROCHLORIDE 10 MILLIGRAM(S): 10 TABLET, FILM COATED ORAL at 21:20

## 2017-03-31 RX ADMIN — Medication 325 MILLIGRAM(S): at 04:23

## 2017-03-31 RX ADMIN — MORPHINE SULFATE 1 MILLIGRAM(S): 50 CAPSULE, EXTENDED RELEASE ORAL at 15:16

## 2017-03-31 RX ADMIN — Medication: at 08:45

## 2017-03-31 RX ADMIN — MORPHINE SULFATE 1 MILLIGRAM(S): 50 CAPSULE, EXTENDED RELEASE ORAL at 19:15

## 2017-03-31 RX ADMIN — SODIUM CHLORIDE 3 MILLILITER(S): 9 INJECTION INTRAMUSCULAR; INTRAVENOUS; SUBCUTANEOUS at 14:36

## 2017-03-31 RX ADMIN — INSULIN GLARGINE 15 UNIT(S): 100 INJECTION, SOLUTION SUBCUTANEOUS at 21:14

## 2017-03-31 RX ADMIN — CYCLOBENZAPRINE HYDROCHLORIDE 10 MILLIGRAM(S): 10 TABLET, FILM COATED ORAL at 17:00

## 2017-03-31 RX ADMIN — MORPHINE SULFATE 1 MILLIGRAM(S): 50 CAPSULE, EXTENDED RELEASE ORAL at 17:53

## 2017-03-31 RX ADMIN — SODIUM CHLORIDE 3 MILLILITER(S): 9 INJECTION INTRAMUSCULAR; INTRAVENOUS; SUBCUTANEOUS at 21:16

## 2017-03-31 RX ADMIN — MORPHINE SULFATE 2 MILLIGRAM(S): 50 CAPSULE, EXTENDED RELEASE ORAL at 23:09

## 2017-03-31 RX ADMIN — Medication: at 18:10

## 2017-03-31 RX ADMIN — ATORVASTATIN CALCIUM 20 MILLIGRAM(S): 80 TABLET, FILM COATED ORAL at 21:14

## 2017-03-31 RX ADMIN — Medication 1 TABLET(S): at 21:19

## 2017-03-31 RX ADMIN — Medication 325 MILLIGRAM(S): at 05:30

## 2017-03-31 RX ADMIN — Medication 325 MILLIGRAM(S): at 12:15

## 2017-03-31 NOTE — DISCHARGE NOTE ADULT - NS AS DC STROKE ED MATERIALS
Stroke Education Booklet/Risk Factors for Stroke/Prescribed Medications/Call 911 for Stroke/Need for Followup After Discharge/Stroke Warning Signs and Symptoms

## 2017-03-31 NOTE — DISCHARGE NOTE ADULT - MEDICATION SUMMARY - MEDICATIONS TO TAKE
I will START or STAY ON the medications listed below when I get home from the hospital:    aspirin 325 mg oral delayed release tablet  -- 1 tab(s) by mouth once a day  -- Indication: For CVA (cerebral vascular accident)    Lantus Solostar Pen 100 units/mL subcutaneous solution  -- 15 unit(s) subcutaneous once a day (at bedtime)  -- Do not drink alcoholic beverages when taking this medication.  It is very important that you take or use this exactly as directed.  Do not skip doses or discontinue unless directed by your doctor.  Keep in refrigerator.  Do not freeze.    -- Indication: For diabetes    atorvastatin 20 mg oral tablet  -- 1 tab(s) by mouth once a day (at bedtime)  -- Indication: For CVA (cerebral vascular accident)    ferrous sulfate 325 mg (65 mg elemental iron) oral tablet  -- 1 tab(s) by mouth 3 times a day  -- Check with your doctor before becoming pregnant.  Do not chew, break, or crush.  May discolor urine or feces.    -- Indication: For Anemia, unspecified type

## 2017-03-31 NOTE — PROGRESS NOTE ADULT - SUBJECTIVE AND OBJECTIVE BOX
HISTORY OF PRESENT ILLNESS:  Pt is a 46 y/o female with h/o DM II, HTN, was noticed by family that she is unable to express her self in clear words and she leans to the left whenever she walked for the last week. CT brain revealed a recent left parietal infarct and an old right posterior parietal infarct.  Pt c/o left sided temporal headache.  No weakness  in the ER, patient has an expressive aphasia. o/e: systolic heart murmur. hgb: 9.1    PT/OT EVALUATION:  Pt noted to be impulsive; pt seen transferring from bed to chair independently  BED MOBILITY:  Independent  TRANSFERS: cg  GAIT: cg/SBA x 50' RW  ADLS: Assist    TODAY'S SUBJECTIVE & REVIEW OF SYMPTOMS:  Pt reports being frustrated with inability to speak well.  In addition, pt c/o severe persistent left-sided headaches.    PHYSICAL EXAM  Vital Signs Last 24 Hrs  T(C): 36.9, Max: 36.9 (03-31 @ 08:16)  T(F): 98.5, Max: 98.5 (03-31 @ 08:16)  HR: 73 (72 - 79)  BP: 138/70 (128/74 - 174/104)  BP(mean): 80 (80 - 80)  RR: 15 (15 - 20)  SpO2: 99% (97% - 100%)        GENERAL: NAD, well-groomed, well-developed  HEAD:  Atraumatic, Normocephalic  EYES: EOMI, PERRLA, conjunctiva and sclera clear  ENMT: No tonsillar erythema, exudates, or enlargement  NECK: TTP left paracervicals.  ROM limited with right lat flex, right rotation and extension with c/o left sided neck pain and headache  HEART: Regular rate and rhythm  CHEST/LUNG: Clear to auscultation bilaterally; No rales, rhonchi, wheezing, or rubs  ABDOMEN: Soft, Nontender, Nondistended  EXTREMITIES:  No edema.  Calves soft and NTTP  SKIN: No rashes or lesions  NERVOUS SYSTEM:  Alert & Oriented X3  +expressive>receptive aphasia.  Pt had slight difficulty following commands  CNs II-XII grossly intact  Motor Strength 5/5 B/L upper and lower extremities.  Neg pronator drift  Sensation appeared intact to light touch symmetrically  DTRs 2+ intact and symmetric      RECENT LABS:                          9.1    8.1   )-----------( 336      ( 29 Mar 2017 21:55 )             33.0     29 Mar 2017 21:55    134    |  97     |  12.0   ----------------------------<  282    3.9     |  26.0   |  0.53     Ca    9.4        29 Mar 2017 21:55    TPro  8.1    /  Alb  4.2    /  TBili  0.2    /  DBili  x      /  AST  12     /  ALT  13     /  AlkPhos  75     29 Mar 2017 21:55          RADIOLOGY/OTHER RESULTS:

## 2017-03-31 NOTE — PROGRESS NOTE ADULT - PROBLEM SELECTOR PLAN 2
-HBA1C:10.4  -FS elevated     -increased lantus to 15u sq qhs   -c/w moderate HSS   will add premeal humalog depending on amount needed   -Pt has never been on injectable insulin will need to be given diabetic education -HBA1C:10.4  -FS elevated     -increased lantus to 15u sq qhs   -c/w moderate HSS   will add premeal humalog depending on amount needed   -Pt has never been on injectable insulin will need to be given diabetic education  -endocrine consulted   -c/w diabetic dash diet

## 2017-03-31 NOTE — PROGRESS NOTE ADULT - ASSESSMENT
Assessment	  46y/o female with HTN, uncontrolled diabetes presenting with CVA; left parietal infarct  aphasia  acute cervical pain and headache    Continue PT with transfer, balance and gait training  OT pending  ST pending  Continue work up and medical management  Rx heat packs prn and flexeril prn for neck pain/headaches.  Consider cervical xrays/mri if not improved  TOLU Monday to evaluate for cardioembolic source; If TOLU negative then ILR (LOOP) pre discharge   MRI/MRA pending  Pt would likely benefit from home services at this time; to include speech therapy and will ascertain PT/OT needs upon further evaluation Assessment	  48y/o female with HTN, uncontrolled diabetes presenting with CVA; left parietal infarct  aphasia  acute cervical pain and headache    Continue PT with transfer, balance and gait training  OT pending  ST pending  Continue work up and medical management  Rx heat packs prn and flexeril prn for neck pain/headaches.  Consider cervical xrays/mri if not improved  TOLU Monday to evaluate for cardioembolic source; If TOLU negative then ILR (LOOP) pre discharge   MRI/MRA pending  Pt would likely benefit from home services at this time with supervision; to include speech therapy and will ascertain PT/OT needs upon further evaluation

## 2017-03-31 NOTE — DISCHARGE NOTE ADULT - NS AS DC FOLLOWUP STROKE INST
Stroke (includes: TIA/SAH/ICH/Ischemic Stroke) Stroke (includes: TIA/SAH/ICH/Ischemic Stroke)/Smoking Cessation

## 2017-03-31 NOTE — PROGRESS NOTE ADULT - SUBJECTIVE AND OBJECTIVE BOX
INTERVAL HISTORY:  still with signficant aphasia , no changes      VITAL SIGNS:  Vital Signs Last 24 Hrs  T(C): 36.9, Max: 36.9 (03-31 @ 08:16)  T(F): 98.5, Max: 98.5 (03-31 @ 08:16)  HR: 73 (72 - 79)  BP: 138/70 (128/74 - 174/104)  BP(mean): 80 (80 - 80)  RR: 15 (15 - 20)  SpO2: 99% (97% - 100%)    PHYSICAL EXAMINATION:    Mentation:  awake and attempting to follow commands.   Language/Speech: Reduced fluency, nonsensical words, no comprehensbile production. Moderate receptive difficulty,   No naming, repetition  CN; EOMI, face symm  Visual Fields:decreased blink to threat on right  Motor: no weakness  Sensory:  DTR:  Babinski:      MEDS:  MEDICATIONS  (STANDING):  sodium chloride 0.9% lock flush 3milliLiter(s) IV Push every 8 hours  pantoprazole    Tablet 40milliGRAM(s) Oral before breakfast  atorvastatin 20milliGRAM(s) Oral at bedtime  aspirin enteric coated 325milliGRAM(s) Oral daily  dextrose 5%. 1000milliLiter(s) IV Continuous <Continuous>  dextrose 50% Injectable 12.5Gram(s) IV Push once  dextrose 50% Injectable 25Gram(s) IV Push once  dextrose 50% Injectable 25Gram(s) IV Push once  insulin glargine Injectable (LANTUS) 10Unit(s) SubCutaneous at bedtime  insulin lispro (HumaLOG) corrective regimen sliding scale  SubCutaneous three times a day before meals    MEDICATIONS  (PRN):  dextrose Gel 1Dose(s) Oral once PRN Blood Glucose LESS THAN 70 milliGRAM(s)/deciliter  glucagon  Injectable 1milliGRAM(s) IntraMuscular once PRN Glucose LESS THAN 70 milligrams/deciliter  acetaminophen 300 mG/codeine 30 mG 1Tablet(s) Oral every 4 hours PRN Severe Pain (7 - 10)      LABS:                          9.1    8.1   )-----------( 336      ( 29 Mar 2017 21:55 )             33.0     29 Mar 2017 21:55    134    |  97     |  12.0   ----------------------------<  282    3.9     |  26.0   |  0.53     Ca    9.4        29 Mar 2017 21:55    TPro  8.1    /  Alb  4.2    /  TBili  0.2    /  DBili  x      /  AST  12     /  ALT  13     /  AlkPhos  75     29 Mar 2017 21:55    LIVER FUNCTIONS - ( 29 Mar 2017 21:55 )  Alb: 4.2 g/dL / Pro: 8.1 g/dL / ALK PHOS: 75 U/L / ALT: 13 U/L / AST: 12 U/L / GGT: x               RADIOLOGY & ADDITIONAL STUDIES:      MRI/ MRA- pending  TTE- LVH    IMPRESSION & PLAN:    Aphasia and visual field loss secondary to left parietal stroke    Plan:  Stroke protocols  Vascular risk managment  Antiplatelet therapy f  PT/Rehab/sppech  Cardiac eval as indicated- TOLU?

## 2017-03-31 NOTE — DISCHARGE NOTE ADULT - CARE PROVIDER_API CALL
Timothy Matthews (MD), Cardiac Electrophysiology; Cardiovascular Disease  [address] [place] [address]  Phone: [phone]  Fax: [phone]    PMD,   PMD IN 1 WEEK  Phone: (   )    -  Fax: (   )    -

## 2017-03-31 NOTE — ED ADULT NURSE REASSESSMENT NOTE - NS ED NURSE REASSESS COMMENT FT1
Brought Pt to 4 brk and report given to Susi, questions answered.
Pt complaints of HA, Percocet given as ordered. will continue to monitor
Pt is resting quietly in NAd, not in acute distress observed, will continue to monitor.
1150 - pt resting in stretcherGERARDO , + expressive aphasia noted while moving all about in stretchershannen at bedside updated on plan of care
1730 - pt c/o headache md called for pain meds secondary to pt not due until 8pm, sister update on plan of care, pt continues to pull the lead off and ambulate to the bathroom without assistance. continues to request room for admission. charge rn and manage aware.
Received Pt awake alert and oriented x 3 respiration even and unlabored, skin warm and dry, color good, Expressive aphasia, Pt ambulates in steady gait, continuous cardiac monitoring in progress, site is unremarkable, Pt's sister at bed side, plan of care made aware, verbalized understanding, kept bed in low position with side rails up, will continue to monitor.
pt c/o bad headache, pt found rolling around in stretcher, clutching her head, Dr. Barroso aware to order something stronger than tylenol. pt's family at bedside requesting to speak with manager about bed situation, Nicole childers called and ref to [facility] to speak with family. family updated on plan of care at this time.
pt sleeping and easily arousable, A&Ox1, resp even and unlabored no distress noted, cardiac monitor in place, pt moving all extems without difficulty, sister at bedside, will continue to monitor

## 2017-03-31 NOTE — PROGRESS NOTE ADULT - PROBLEM SELECTOR PLAN 1
Pt clinically still has expressive aphasia and headache   -c/w stroke protocol  -c/w telemetry thus far no arrythmias observed   -c/w asa/statin/heparin sq   -will c/w heat packs prn and flexeril prn for neck pain/headaches as well as tylenol #3 as needed   -f/u MRI brain, MRA head and neck/Carotid U/S  -Cardiology consult noted and appreciated ECHO wnl as stated above and pt will likely have TOLU on monday and if negative will likely need ILR. Will keep pt NPO after Midnight sunday   -c/w PT and speech therapy/OT to evaluate the patient

## 2017-03-31 NOTE — PROGRESS NOTE ADULT - PROBLEM SELECTOR PROBLEM 2
Type 2 diabetes mellitus with complication, without long-term current use of insulin Uncontrolled diabetes mellitus

## 2017-03-31 NOTE — DISCHARGE NOTE ADULT - HOSPITAL COURSE
47 F with PMH HTN, DM II and anemia presented with expressive aphasia, headache and unsteady gait admitted with CVA -multiple left hemispheric infarcts on CT (recent left parietal infarct and an old right posterior parietal infarct) and physical exam with persistent expressive aphasia.   Pt has persistant aphasia. Found to have on MRI and MRA left parietal temporal subdural acute/subacute infarct. Smaller focus of acute/subacute infarct in the left occipital lobe. TOLU negative. No atrial fibrillation on telemetry. LOOP recorder recommended but pt wants to follow up on outpatient.   Pt to c/w asa/statin. Also found to have occlusion of left carotid artery. sp vascular consult, no acute intervention.cw asa 325mg and lipitor.   Pt also noted to have diabetes mellitus with complication, without long-term current use of insulin.  Pt was started on lantus. HBA1C:10.4. Pt was never on injectable insulin will need to be given diabetic education but pt left AMA prior to receiving education.     Pt was waiting to be discharged but left AMA without diabetic education. Left message on patients phone to call back for mediation information. Local police department also notified by staff. 47 F with PMH HTN, DM II and anemia presented with expressive aphasia, headache and unsteady gait admitted with CVA -multiple left hemispheric infarcts on CT (recent left parietal infarct and an old right posterior parietal infarct) and physical exam with persistent expressive aphasia.   Pt has persistant aphasia. Found to have on MRI and MRA left parietal temporal subdural acute/subacute infarct. Smaller focus of acute/subacute infarct in the left occipital lobe. TOLU negative. No atrial fibrillation on telemetry. LOOP recorder recommended but pt wants to follow up on outpatient.   Pt to c/w asa/statin. Also found to have occlusion of left carotid artery. sp vascular consult, no acute intervention.cw asa 325mg and lipitor.   Pt also noted to have diabetes mellitus with complication, without long-term current use of insulin.  Pt was started on lantus. HBA1C:10.4. Pt was never on injectable insulin will need to be given diabetic education but pt left AMA prior to receiving education.     GEN NAD IN BED  HEENT NORMAL CEPHALIC ATRAUMATIC  NEURO +APHASIA WITH WORD FINDING DIFFICULTY, AMBULATING WITHOUT DIFFICULTY  PSYH ANXIOUS    Pt was waiting to be discharged but left AMA without diabetic education. Left message on patients phone to call back for mediation information. Local police department also notified by staff. 47 F with PMH HTN, DM II and anemia presented with expressive aphasia, headache and unsteady gait admitted with CVA -multiple left hemispheric infarcts on CT (recent left parietal infarct and an old right posterior parietal infarct) and physical exam with persistent expressive aphasia.   Pt has persistant aphasia. Found to have on MRI and MRA left parietal temporal subdural acute/subacute infarct. Smaller focus of acute/subacute infarct in the left occipital lobe. TOLU negative. No atrial fibrillation on telemetry. LOOP recorder recommended but pt wants to follow up on outpatient.   Pt to c/w asa/statin. Also found to have occlusion of left carotid artery. sp vascular consult, no acute intervention.cw asa 325mg and lipitor.   Pt also noted to have diabetes mellitus with complication, without long-term current use of insulin.  Pt was started on lantus. HBA1C:10.4. Pt was never on injectable insulin will need to be given diabetic education but pt left AMA prior to receiving education.     GEN NAD IN BED  HEENT NORMAL CEPHALIC ATRAUMATIC  NEURO +APHASIA WITH WORD FINDING DIFFICULTY, AMBULATING WITHOUT DIFFICULTY  PSYH ANXIOUS    Pt was waiting to be discharged but left AMA without diabetic education. Left message on patients phone to call back for mediation information. Local police department also notified by staff.     I spent 40minutes discharging this patient.

## 2017-03-31 NOTE — DISCHARGE NOTE ADULT - PATIENT PORTAL LINK FT
“You can access the [facility] Patient Portal, offered by [facility], by registering with the following website: [email or web address]”

## 2017-03-31 NOTE — DISCHARGE NOTE ADULT - SECONDARY DIAGNOSIS.
Aphasia complicating stroke Essential hypertension Type 2 diabetes mellitus with complication, without long-term current use of insulin

## 2017-03-31 NOTE — DISCHARGE NOTE ADULT - CARE PLAN
Principal Discharge DX:	Cerebrovascular accident (CVA), unspecified mechanism  Instructions for follow-up, activity and diet:	Pt with aphasia secondary to acute multi infarct cva.  speech therapy recommended on outpatient  cw asa and lipitor  neuro noted  Secondary Diagnosis:	Aphasia complicating stroke  Instructions for follow-up, activity and diet:	speech therapy on outpatient  Secondary Diagnosis:	Essential hypertension  Instructions for follow-up, activity and diet:	stable  Secondary Diagnosis:	Type 2 diabetes mellitus with complication, without long-term current use of insulin  Instructions for follow-up, activity and diet:	on lantus   unclear if patient took insulin in the past Principal Discharge DX:	Cerebrovascular accident (CVA), unspecified mechanism  Goal:	risk control  Instructions for follow-up, activity and diet:	Pt with aphasia secondary to acute multi infarct cva.  speech therapy recommended on outpatient  cw asa and lipitor  neuro noted  Secondary Diagnosis:	Aphasia complicating stroke  Instructions for follow-up, activity and diet:	speech therapy on outpatient  Secondary Diagnosis:	Essential hypertension  Instructions for follow-up, activity and diet:	stable  Secondary Diagnosis:	Type 2 diabetes mellitus with complication, without long-term current use of insulin  Instructions for follow-up, activity and diet:	on lantus while inpatient. hgba1c 10.4  appears that pt has not been on insulin in the past. Pt left AMA. Pt did not stay for insulin teaching. pt at risk for uncontrolled diabetes and complications such as DKA and or death. Security was called. Local police dept to be notified. message left on patients phone to call back concerning her medications.

## 2017-03-31 NOTE — DISCHARGE NOTE ADULT - PLAN OF CARE
Pt with aphasia secondary to acute multi infarct cva.  speech therapy recommended on outpatient  cw asa and lipitor  neuro noted speech therapy on outpatient stable on lantus   unclear if patient took insulin in the past risk control on lantus while inpatient. hgba1c 10.4  appears that pt has not been on insulin in the past. Pt left AMA. Pt did not stay for insulin teaching. pt at risk for uncontrolled diabetes and complications such as DKA and or death. Security was called. Local police dept to be notified. message left on patients phone to call back concerning her medications.

## 2017-03-31 NOTE — DISCHARGE NOTE ADULT - PROVIDER TOKENS
TOKEN:'[id number]',FREE:[LAST:[PMD],PHONE:[(   )    -],FAX:[(   )    -],ADDRESS:[PMD IN 1 WEEK]]

## 2017-03-31 NOTE — PROGRESS NOTE ADULT - SUBJECTIVE AND OBJECTIVE BOX
[facility]                                   [address] [place] [address]                                                      PHONE: [phone]                                                         FAX: [phone]  [email or web address]  ---------------------------------------------------------------------------------------------------------------------------------    Overnight events/patient complaints:  NAD     No Known Allergies    MEDICATIONS  (STANDING):  sodium chloride 0.9% lock flush 3milliLiter(s) IV Push every 8 hours  pantoprazole    Tablet 40milliGRAM(s) Oral before breakfast  atorvastatin 20milliGRAM(s) Oral at bedtime  aspirin enteric coated 325milliGRAM(s) Oral daily  dextrose 5%. 1000milliLiter(s) IV Continuous <Continuous>  dextrose 50% Injectable 12.5Gram(s) IV Push once  dextrose 50% Injectable 25Gram(s) IV Push once  dextrose 50% Injectable 25Gram(s) IV Push once  insulin glargine Injectable (LANTUS) 10Unit(s) SubCutaneous at bedtime  insulin lispro (HumaLOG) corrective regimen sliding scale  SubCutaneous three times a day before meals    MEDICATIONS  (PRN):  dextrose Gel 1Dose(s) Oral once PRN Blood Glucose LESS THAN 70 milliGRAM(s)/deciliter  glucagon  Injectable 1milliGRAM(s) IntraMuscular once PRN Glucose LESS THAN 70 milligrams/deciliter  acetaminophen 300 mG/codeine 30 mG 1Tablet(s) Oral every 4 hours PRN Severe Pain (7 - 10)      Vital Signs Last 24 Hrs  T(C): 36.9, Max: 36.9 (03-31 @ 08:16)  T(F): 98.5, Max: 98.5 (03-31 @ 08:16)  HR: 73 (72 - 79)  BP: 138/70 (128/74 - 174/104)  BP(mean): 80 (80 - 80)  RR: 15 (15 - 20)  SpO2: 99% (97% - 100%)  ICU Vital Signs Last 24 Hrs  ALEXRADHA SIEGELERICA  I&O's Detail    I & Os for current day (as of 31 Mar 2017 10:59)  =============================================  IN:    Oral Fluid: 240 ml    Total IN: 240 ml  ---------------------------------------------  OUT:    Total OUT: 0 ml  ---------------------------------------------  Total NET: 240 ml    I&O's Summary    I & Os for current day (as of 31 Mar 2017 10:59)  =============================================  IN: 240 ml / OUT: 0 ml / NET: 240 ml    Drug Dosing Weight  ALEX MALDONADO      PHYSICAL EXAM:  General: Appears well developed, well nourished alert and cooperative.  HEENT: Head; normocephalic, atraumatic.  Eyes: Pupils reactive, cornea wnl.  Neck: Supple, no nodes adenopathy, no NVD or carotid bruit or thyromegaly.  CARDIOVASCULAR: Normal S1 and S2, No murmur, rub, gallop or lift.   LUNGS: No rales, rhonchi or wheeze. Normal breath sounds bilaterally.  ABDOMEN: Soft, nontender without mass or organomegaly. bowel sounds normoactive.  EXTREMITIES: No clubbing, cyanosis or edema. Distal pulses wnl.   SKIN: warm and dry with normal turgor.  PSYCH: normal affect.        LABS:                        9.1    8.1   )-----------( 336      ( 29 Mar 2017 21:55 )             33.0     29 Mar 2017 21:55    134    |  97     |  12.0   ----------------------------<  282    3.9     |  26.0   |  0.53     Ca    9.4        29 Mar 2017 21:55    TPro  8.1    /  Alb  4.2    /  TBili  0.2    /  DBili  x      /  AST  12     /  ALT  13     /  AlkPhos  75     29 Mar 2017 21:55    ALEX MALDONADO            RADIOLOGY & ADDITIONAL STUDIES:    INTERPRETATION OF TELEMETRY (personally reviewed): NSR     ECG: NSR     ECHO:   Summary:   1. Hyperdynamic left ventricle. Left ventricular ejection fraction, by   visual estimation, is >75%.   2. Spectral Doppler shows impaired relaxation pattern of left   ventricular myocardial filling (Grade I diastolic dysfunction).   3. RV systolic function is normal.   4. No significant valvular abnormalities.   5. No pericardial effusion.   6. ** No prior echocardiograms available for comparison.        ASSESSMENT AND PLAN:  In summary, ALEX MALDONADO is an 47y Female with past medical history significant for HTN HDL DM CVA now with acute CVA; EKG NSR and ECHO normal EF without any significant valvular disease     Plan  1.  TOLU Monday to evaluate for cardioembolic source; please keep NPO after Midnight sunday   2.  If TOLU negative then ILR (LOOP) pre discharge   3.  Continue current CV medications

## 2017-03-31 NOTE — PROGRESS NOTE ADULT - SUBJECTIVE AND OBJECTIVE BOX
Patient is a 47y old  Female who presents with a chief complaint of unable to speak properly x1 week (31 Mar 2017 03:05)      HEALTH ISSUES - PROBLEM Dx:  Hemianopsia: Hemianopsia  Aphasia complicating stroke: Aphasia complicating stroke  Cerebrovascular accident (CVA) due to other mechanism: Cerebrovascular accident (CVA) due to other mechanism  Anemia, unspecified type: Anemia, unspecified type  Gastroesophageal reflux disease without esophagitis: Gastroesophageal reflux disease without esophagitis  Heart murmur: Heart murmur  Essential hypertension: Essential hypertension  Type 2 diabetes mellitus with complication, without long-term current use of insulin: Type 2 diabetes mellitus with complication, without long-term current use of insulin  Cerebrovascular accident (CVA) due to embolism of precerebral artery: Cerebrovascular accident (CVA) due to embolism of precerebral artery      INTERVAL HPI/OVERNIGHT EVENTS:  Patient seen and examined at bedside. No acute events overnight. Patient states that she continues to have a headache, no relief with tylenol/percoset 7/10 mostly left sided. As per nurse bp elevated 178/70 but manual bp shows 130/60. Patient denies chest pain, SOB, abd pain, N/V, fever, chills, weakness, blurry vision, dysuria or any other complaints. All remainder ROS negative.     Vital Signs Last 24 Hrs  T(C): 37, Max: 37 (03-31 @ 12:11)  T(F): 98.6, Max: 98.6 (03-31 @ 12:11)  HR: 73 (72 - 79)  BP: 138/70 (128/74 - 174/104)  BP(mean): 80 (80 - 80)  RR: 15 (15 - 20)  SpO2: 99% (97% - 100%)    CAPILLARY BLOOD GLUCOSE  322 (31 Mar 2017 08:16)  323 (30 Mar 2017 19:36)      I&O's Summary    I & Os for current day (as of 31 Mar 2017 14:13)  =============================================  IN: 240 ml / OUT: 0 ml / NET: 240 ml      CARDIAC: Normal rate, regular rhythm.  Heart sounds S1, S2.  No murmurs, rubs or gallops   RESPIRATORY: Breath sounds clear and equal bilaterally. No wheezes, rhales or rhonchi  MUSCULOSKELETAL: Spine appears normal, range of motion is not limited, no muscle or joint tenderness  EXTREMITIES: No edema, cyanosis or deformity   NEUROLOGICAL: Alert and oriented, no focal deficits, no motor or sensory deficits. Expressive aphasia persists but pt is able at times to express her thoughts using her words   SKIN: No rash, skin turgor     MEDICATIONS  (STANDING):  sodium chloride 0.9% lock flush 3milliLiter(s) IV Push every 8 hours  pantoprazole    Tablet 40milliGRAM(s) Oral before breakfast  atorvastatin 20milliGRAM(s) Oral at bedtime  aspirin enteric coated 325milliGRAM(s) Oral daily  dextrose 5%. 1000milliLiter(s) IV Continuous <Continuous>  dextrose 50% Injectable 12.5Gram(s) IV Push once  dextrose 50% Injectable 25Gram(s) IV Push once  dextrose 50% Injectable 25Gram(s) IV Push once  insulin glargine Injectable (LANTUS) 10Unit(s) SubCutaneous at bedtime  insulin lispro (HumaLOG) corrective regimen sliding scale  SubCutaneous three times a day before meals    MEDICATIONS  (PRN):  dextrose Gel 1Dose(s) Oral once PRN Blood Glucose LESS THAN 70 milliGRAM(s)/deciliter  glucagon  Injectable 1milliGRAM(s) IntraMuscular once PRN Glucose LESS THAN 70 milligrams/deciliter  acetaminophen 300 mG/codeine 30 mG 1Tablet(s) Oral every 4 hours PRN Severe Pain (7 - 10)  cyclobenzaprine 10milliGRAM(s) Oral three times a day PRN Muscle Spasm      LABS:                        9.1    8.1   )-----------( 336      ( 29 Mar 2017 21:55 )             33.0     29 Mar 2017 21:55    134    |  97     |  12.0   ----------------------------<  282    3.9     |  26.0   |  0.53     Ca    9.4        29 Mar 2017 21:55    TPro  8.1    /  Alb  4.2    /  TBili  0.2    /  DBili  x      /  AST  12     /  ALT  13     /  AlkPhos  75     29 Mar 2017 21:55        LIVER FUNCTIONS - ( 29 Mar 2017 21:55 )  Alb: 4.2 g/dL / Pro: 8.1 g/dL / ALK PHOS: 75 U/L / ALT: 13 U/L / AST: 12 U/L / GGT: x             RADIOLOGY & ADDITIONAL TESTS:    ECHO:   Summary:   1. Hyperdynamic left ventricle. Left ventricular ejection fraction, by   visual estimation, is >75%.   2. Spectral Doppler shows impaired relaxation pattern of left   ventricular myocardial filling (Grade I diastolic dysfunction).   3. RV systolic function is normal.   4. No significant valvular abnormalities.   5. No pericardial effusion.

## 2017-04-01 DIAGNOSIS — I65.22 OCCLUSION AND STENOSIS OF LEFT CAROTID ARTERY: ICD-10-CM

## 2017-04-01 DIAGNOSIS — E13.8 OTHER SPECIFIED DIABETES MELLITUS WITH UNSPECIFIED COMPLICATIONS: ICD-10-CM

## 2017-04-01 LAB
ANION GAP SERPL CALC-SCNC: 9 MMOL/L — SIGNIFICANT CHANGE UP (ref 5–17)
BASOPHILS # BLD AUTO: 0 K/UL — SIGNIFICANT CHANGE UP (ref 0–0.2)
BASOPHILS NFR BLD AUTO: 0.3 % — SIGNIFICANT CHANGE UP (ref 0–2)
BUN SERPL-MCNC: 9 MG/DL — SIGNIFICANT CHANGE UP (ref 8–20)
CALCIUM SERPL-MCNC: 9.1 MG/DL — SIGNIFICANT CHANGE UP (ref 8.6–10.2)
CHLORIDE SERPL-SCNC: 100 MMOL/L — SIGNIFICANT CHANGE UP (ref 98–107)
CO2 SERPL-SCNC: 28 MMOL/L — SIGNIFICANT CHANGE UP (ref 22–29)
CREAT SERPL-MCNC: 0.59 MG/DL — SIGNIFICANT CHANGE UP (ref 0.5–1.3)
EOSINOPHIL # BLD AUTO: 0.2 K/UL — SIGNIFICANT CHANGE UP (ref 0–0.5)
EOSINOPHIL NFR BLD AUTO: 2.9 % — SIGNIFICANT CHANGE UP (ref 0–6)
FERRITIN SERPL-MCNC: 6.2 NG/ML — LOW (ref 11–306)
FOLATE SERPL-MCNC: 16.2 NG/ML — HIGH (ref 4–16)
GLUCOSE SERPL-MCNC: 298 MG/DL — HIGH (ref 70–115)
HCT VFR BLD CALC: 31.8 % — LOW (ref 37–47)
HGB BLD-MCNC: 8.8 G/DL — LOW (ref 12–16)
IRON SATN MFR SERPL: 25 UG/DL — LOW (ref 37–145)
IRON SATN MFR SERPL: 6 % — LOW (ref 14–50)
LYMPHOCYTES # BLD AUTO: 2.1 K/UL — SIGNIFICANT CHANGE UP (ref 1–4.8)
LYMPHOCYTES # BLD AUTO: 32.6 % — SIGNIFICANT CHANGE UP (ref 20–55)
MCHC RBC-ENTMCNC: 19.2 PG — LOW (ref 27–31)
MCHC RBC-ENTMCNC: 27.7 G/DL — LOW (ref 32–36)
MCV RBC AUTO: 69.3 FL — LOW (ref 81–99)
MONOCYTES # BLD AUTO: 0.4 K/UL — SIGNIFICANT CHANGE UP (ref 0–0.8)
MONOCYTES NFR BLD AUTO: 6.3 % — SIGNIFICANT CHANGE UP (ref 3–10)
NEUTROPHILS # BLD AUTO: 3.7 K/UL — SIGNIFICANT CHANGE UP (ref 1.8–8)
NEUTROPHILS NFR BLD AUTO: 57.4 % — SIGNIFICANT CHANGE UP (ref 37–73)
PLATELET # BLD AUTO: 207 K/UL — SIGNIFICANT CHANGE UP (ref 150–400)
POTASSIUM SERPL-MCNC: 4.1 MMOL/L — SIGNIFICANT CHANGE UP (ref 3.5–5.3)
POTASSIUM SERPL-SCNC: 4.1 MMOL/L — SIGNIFICANT CHANGE UP (ref 3.5–5.3)
RBC # BLD: 4.59 M/UL — SIGNIFICANT CHANGE UP (ref 4.4–5.2)
RBC # BLD: 4.59 M/UL — SIGNIFICANT CHANGE UP (ref 4.4–5.2)
RBC # FLD: 19.2 % — HIGH (ref 11–15.6)
RETICS #: 85.8 K/UL — SIGNIFICANT CHANGE UP (ref 25–125)
RETICS/RBC NFR: 1.9 % — SIGNIFICANT CHANGE UP (ref 0.5–2.6)
SODIUM SERPL-SCNC: 137 MMOL/L — SIGNIFICANT CHANGE UP (ref 135–145)
TIBC SERPL-MCNC: 420 UG/DL — SIGNIFICANT CHANGE UP (ref 220–430)
TRANSFERRIN SERPL-MCNC: 294 MG/DL — SIGNIFICANT CHANGE UP (ref 192–382)
VIT B12 SERPL-MCNC: 576 PG/ML — SIGNIFICANT CHANGE UP (ref 180–914)
WBC # BLD: 6.5 K/UL — SIGNIFICANT CHANGE UP (ref 4.8–10.8)
WBC # FLD AUTO: 6.5 K/UL — SIGNIFICANT CHANGE UP (ref 4.8–10.8)

## 2017-04-01 PROCEDURE — 70544 MR ANGIOGRAPHY HEAD W/O DYE: CPT | Mod: 26,59

## 2017-04-01 PROCEDURE — 70551 MRI BRAIN STEM W/O DYE: CPT | Mod: 26

## 2017-04-01 PROCEDURE — 99233 SBSQ HOSP IP/OBS HIGH 50: CPT

## 2017-04-01 PROCEDURE — 70547 MR ANGIOGRAPHY NECK W/O DYE: CPT | Mod: 26

## 2017-04-01 RX ORDER — DIPHENHYDRAMINE HCL 50 MG
25 CAPSULE ORAL ONCE
Qty: 0 | Refills: 0 | Status: COMPLETED | OUTPATIENT
Start: 2017-04-01 | End: 2017-04-01

## 2017-04-01 RX ORDER — INSULIN GLARGINE 100 [IU]/ML
15 INJECTION, SOLUTION SUBCUTANEOUS AT BEDTIME
Qty: 0 | Refills: 0 | Status: DISCONTINUED | OUTPATIENT
Start: 2017-04-01 | End: 2017-04-04

## 2017-04-01 RX ORDER — FERROUS SULFATE 325(65) MG
325 TABLET ORAL
Qty: 0 | Refills: 0 | Status: DISCONTINUED | OUTPATIENT
Start: 2017-04-01 | End: 2017-04-04

## 2017-04-01 RX ADMIN — MORPHINE SULFATE 2 MILLIGRAM(S): 50 CAPSULE, EXTENDED RELEASE ORAL at 19:40

## 2017-04-01 RX ADMIN — HEPARIN SODIUM 5000 UNIT(S): 5000 INJECTION INTRAVENOUS; SUBCUTANEOUS at 21:30

## 2017-04-01 RX ADMIN — Medication 325 MILLIGRAM(S): at 19:41

## 2017-04-01 RX ADMIN — Medication: at 08:11

## 2017-04-01 RX ADMIN — CYCLOBENZAPRINE HYDROCHLORIDE 10 MILLIGRAM(S): 10 TABLET, FILM COATED ORAL at 08:09

## 2017-04-01 RX ADMIN — Medication 25 MILLIGRAM(S): at 15:00

## 2017-04-01 RX ADMIN — Medication 650 MILLIGRAM(S): at 23:30

## 2017-04-01 RX ADMIN — MORPHINE SULFATE 2 MILLIGRAM(S): 50 CAPSULE, EXTENDED RELEASE ORAL at 06:02

## 2017-04-01 RX ADMIN — MORPHINE SULFATE 2 MILLIGRAM(S): 50 CAPSULE, EXTENDED RELEASE ORAL at 15:15

## 2017-04-01 RX ADMIN — SODIUM CHLORIDE 3 MILLILITER(S): 9 INJECTION INTRAMUSCULAR; INTRAVENOUS; SUBCUTANEOUS at 06:00

## 2017-04-01 RX ADMIN — MORPHINE SULFATE 2 MILLIGRAM(S): 50 CAPSULE, EXTENDED RELEASE ORAL at 00:00

## 2017-04-01 RX ADMIN — MORPHINE SULFATE 2 MILLIGRAM(S): 50 CAPSULE, EXTENDED RELEASE ORAL at 20:00

## 2017-04-01 RX ADMIN — Medication: at 12:43

## 2017-04-01 RX ADMIN — INSULIN GLARGINE 15 UNIT(S): 100 INJECTION, SOLUTION SUBCUTANEOUS at 21:31

## 2017-04-01 RX ADMIN — MORPHINE SULFATE 2 MILLIGRAM(S): 50 CAPSULE, EXTENDED RELEASE ORAL at 14:51

## 2017-04-01 RX ADMIN — HEPARIN SODIUM 5000 UNIT(S): 5000 INJECTION INTRAVENOUS; SUBCUTANEOUS at 14:55

## 2017-04-01 RX ADMIN — CYCLOBENZAPRINE HYDROCHLORIDE 10 MILLIGRAM(S): 10 TABLET, FILM COATED ORAL at 22:40

## 2017-04-01 RX ADMIN — PANTOPRAZOLE SODIUM 40 MILLIGRAM(S): 20 TABLET, DELAYED RELEASE ORAL at 06:02

## 2017-04-01 RX ADMIN — MORPHINE SULFATE 2 MILLIGRAM(S): 50 CAPSULE, EXTENDED RELEASE ORAL at 06:56

## 2017-04-01 RX ADMIN — ATORVASTATIN CALCIUM 20 MILLIGRAM(S): 80 TABLET, FILM COATED ORAL at 21:30

## 2017-04-01 RX ADMIN — Medication: at 16:00

## 2017-04-01 RX ADMIN — SODIUM CHLORIDE 3 MILLILITER(S): 9 INJECTION INTRAMUSCULAR; INTRAVENOUS; SUBCUTANEOUS at 21:21

## 2017-04-01 RX ADMIN — Medication 650 MILLIGRAM(S): at 22:41

## 2017-04-01 RX ADMIN — Medication 325 MILLIGRAM(S): at 14:54

## 2017-04-01 RX ADMIN — SODIUM CHLORIDE 3 MILLILITER(S): 9 INJECTION INTRAMUSCULAR; INTRAVENOUS; SUBCUTANEOUS at 14:55

## 2017-04-01 RX ADMIN — HEPARIN SODIUM 5000 UNIT(S): 5000 INJECTION INTRAVENOUS; SUBCUTANEOUS at 06:02

## 2017-04-01 NOTE — PROGRESS NOTE ADULT - SUBJECTIVE AND OBJECTIVE BOX
CHIEF COMPLAINT:  Stroke/aphasia    INTERVAL HISTORY:    MRIs completed.     VITAL SIGNS:  Vital Signs Last 24 Hrs  T(C): 36.9, Max: 36.9 (04-01 @ 04:43)  T(F): 98.5, Max: 98.5 (04-01 @ 12:05)  HR: 74 (69 - 80)  BP: 124/76 (111/59 - 150/57)  BP(mean): --  RR: 16 (15 - 18)  SpO2: 100% (99% - 100%)    PHYSICAL EXAMINATION:  General: Well-developed, well nourished, in no acute distress.  Eyes: Conjunctiva and sclera clear.    Neurological Exam:  Patient is asleep and awakes. expressive aphasia. Follows one step commands. There is right lower quadrant visual deficit.    Pupils are equal and reactive. Extra occular  muscles are intact. There is mild/? right facial droop.    On motor examination right upper an right lower is 4/5 and there is  pronator drift. Left upper and left lower is 5/5. Sensory is decreased to pinprick right upper and right lower. DTR are 2/4 all 4 extremities. Babinski is equivocal on right.        MEDS:  MEDICATIONS  (STANDING):  sodium chloride 0.9% lock flush 3milliLiter(s) IV Push every 8 hours  pantoprazole    Tablet 40milliGRAM(s) Oral before breakfast  atorvastatin 20milliGRAM(s) Oral at bedtime  aspirin enteric coated 325milliGRAM(s) Oral daily  dextrose 5%. 1000milliLiter(s) IV Continuous <Continuous>  dextrose 50% Injectable 12.5Gram(s) IV Push once  dextrose 50% Injectable 25Gram(s) IV Push once  dextrose 50% Injectable 25Gram(s) IV Push once  insulin lispro (HumaLOG) corrective regimen sliding scale  SubCutaneous three times a day before meals  heparin  Injectable 5000Unit(s) SubCutaneous every 8 hours  insulin glargine Injectable (LANTUS) 15Unit(s) SubCutaneous at bedtime    MEDICATIONS  (PRN):  dextrose Gel 1Dose(s) Oral once PRN Blood Glucose LESS THAN 70 milliGRAM(s)/deciliter  glucagon  Injectable 1milliGRAM(s) IntraMuscular once PRN Glucose LESS THAN 70 milligrams/deciliter  cyclobenzaprine 10milliGRAM(s) Oral three times a day PRN Muscle Spasm  acetaminophen   Tablet. 650milliGRAM(s) Oral every 6 hours PRN Mild Pain (1 - 3)  morphine  - Injectable 2milliGRAM(s) IV Push every 6 hours PRN Severe Pain (7 - 10)      LABS:                          8.8    6.5   )-----------( 207      ( 01 Apr 2017 07:45 )             31.8     01 Apr 2017 07:45    137    |  100    |  9.0    ----------------------------<  298    4.1     |  28.0   |  0.59     Ca    9.1        01 Apr 2017 07:45            RADIOLOGY & ADDITIONAL STUDIES:    EXAM:  ECHO TRANSTHORACIC COMP W DOPP      PROCEDURE DATE:  Mar 30 2017   .      INTERPRETATION:  REPORT:    TRANSTHORACIC ECHOCARDIOGRAM REPORT           Patient Name:   ALEX MALDONADO Patient Location: Inpatient  Medical Rec #:  [id number] Accession #:      [id number]  Account #:                       Height:           66.9 in 170.0 cm  YOB: 1970        Weight:           207.2 lb 94.00 kg  Patient Age:    47 years         BSA:              2.05 m²  Patient Gender: F          BP:               155/70 mmHg        Date of Exam:        3/30/2017 2:21:33 PM  Sonographer:         Areli Cordoba  Referring Physician: Sagar Kruse MD     Procedure:   2D Echo/Doppler/Color Doppler Complete.  Indications: Personal history of transient ischemic attack (TIA), and   cerebral               infarction without residual deficits - Z86.73  Diagnosis:   Personal history of transient ischemic attack (TIA), and   cerebral               infarction without residual deficits - Z86.73           2D AND M-MODE MEASUREMENTS (normal ranges within parentheses):  Left                 Normal   Aorta/Left            Normal  Ventricle:                    Atrium:  IVSd (2D):    1.13  (0.7-1.1) Aortic Root  2.86 cm (2.4-3.7)      cm             (Mmode):  LVPWd (2D):   1.15  (0.7-1.1) LA Volume     30.7                 cm             Index         ml/m²  LVIDd (2D):   5.10  (3.4-5.7)                 cm  IVSd (Mmode): 1.10  (0.7-1.1)                 cm  LVPWd         1.10  (0.7-1.1)  (Mmode):       cm  LVIDd         5.46  (3.4-5.7)  (Mmode):       cm  LVIDs         2.97  (Mmode):       cm  LV FS         45.6   (>25%)  (Mmode):        %  Relative Wall 0.45   (<0.42)  Thickness  Rel. Wall     0.40   (<0.42)  Thickness Mm  LV Mass       116.7  Index: Mmode  g/m²     LV SYSTOLIC FUNCTION BY 2D PLANIMETRY (MOD):  EF-Biplane: 81.3 %     LV DIASTOLIC FUNCTION:  MV Peak E: 1.05 m/s e', MV Tika: 0.07 m/s  MV Peak A: 0.88 m/s E/e' Ratio: 15.00  E/A Ratio: 1.19     SPECTRAL DOPPLER ANALYSIS (where applicable):     PHYSICIAN INTERPRETATION:  Left Ventricle: The left ventricular internal cavity size is normal.   There is mild concentric hypertrophy.  Global LV systolic function was hyperdynamic. There are no regional wall   motion abnormalities. Left ventricular ejection fraction, by visual   estimation, is >75%. Spectral Doppler shows impaired relaxation pattern   of left ventricular myocardial filling (Grade I diastolic dysfunction).  Right Ventricle: The right ventricular size is normal. RV systolic   function is normal.  Left Atrium: Normal left atrial size.  Right Atrium: The right atrium is normal in size.  Pericardium: There is no evidence of pericardial effusion.  Mitral Valve: The mitral valve is normal in structure. No mitral   stenosis. Trace mitral valve regurgitation is seen.  Tricuspid Valve: The tricuspid valve is normal in structure. Trivial   tricuspid regurgitation is visualized. There is too little TR to estimate   PA systolic pressure.  Aortic Valve: The aortic valve is trileaflet. Leaflet thickness and   mobility are normal. No aortic stenosis. No evidence of aortic valve   regurgitation is seen.  Pulmonic Valve: Structurally normal pulmonic valve, with normal leaflet   excursion. Trace pulmonic valve regurgitation.  Aorta: The visualized portions of the aortic root and aortic arch are   normal in size and structure.  Pulmonary Artery: The main pulmonary artery is normal in size.  Venous: The proximal inferior vena cava was normal sized, with   respiratory size variation greater than 50%, consistent with normal   central venous pressure. Estimated RA pressure = 3mmHg.        Summary:   1. Hyperdynamic left ventricle. Left ventricular ejection fraction, by   visual estimation, is >75%.   2. Spectral Doppler shows impaired relaxation pattern of left   ventricular myocardial filling (Grade I diastolic dysfunction).   3. RV systolic function is normal.   4. No significant valvular abnormalities.   5. No pericardial effusion.   6. ** No prior echocardiograms available for comparison.     Lisa Ahmadi DO Electronically signed on 3/30/2017 at 8:09:48 PM         EXAM:  CAROTID   NECK(MRA)W O CON                         EXAM:  HEAD(MRA)W O CON                         EXAM:  BRAIN (MRI) W O CON                          PROCEDURE DATE:  04/01/2017        INTERPRETATION:  MRI BRAIN WITHOUT CONTRAST  MRA HEAD WITHOUT CONTRAST  MRA NECK WITHOUT CONTRAST    HISTORY: Stroke.     TECHNIQUE:   1. MRI of the brain without contrast was performed.   2. MR angiogram of neck and [place] was performed with   time-of-flight technique without intravenous gadolinium. 3D reformatted   images of the MRA was performed.    COMPARISON: CT head 3/30/2017. No prior brain MRI. Carotid ultrasound   3/30/2017.    FINDINGS:    MRI Brain:  There is an area of diffusion destruction with associated T2/FLAIR   hyperintensity in the left parietotemporal region, with smaller foci in   the left occipital lobe, compatible with acute/subacute infarcts.    There is a chronic infarct of the right occipital lobe.    The ventricles and cortical sulci are prominent reflecting parenchymal   volume loss. There is no evidence of acute intracranial hemorrhage, mass   effect, hydrocephalus, or midline shift. The basal cisterns are patent.    Scattered foci of T2/FLAIR hyperintensity are noted in the   periventricular and subcortical white matter, nonspecific but likely   sequela of small vessel ischemic disease.    The paranasal sinuses are well aerated. Scattered fluid signal in the   bilateral mastoid air cells.    MRA head and neck:   Study is limited by motion artifact.    Conventional arch anatomy. Innominate and visualized subclavian arteries   are patent.    The common carotid arteries are unremarkable. There is moderate to severe   narrowing of the left internal carotid artery from the left carotid   bifurcation,to the petrous segment. There is lack of flow related   enhancement in the cavernous and supraclinoid segments of the left   internal carotid artery.    There is also lack of flow related enhancement in the left middle   cerebral artery.    The petrous, cavernous and supraclinoid right internal carotid artery   segments is patent. The left A1 anterior cerebral artery segment is   patent. The right A1 and bilateral A2 segments are patent. The M1 segment   of the right middle cerebral artery and its bifurcation is patent.    The vertebral arteries are patent from their origins to their   intracranial segments. The basilar artery is patent. The posterior   cerebral and superior cerebellar arteries arise normally from the basilar   artery.    There is no aneurysm visualized in the anterior or posterior circulation   of the brain.    MRA findings were confirmed on the 3D reformatted images.    IMPRESSION:  Left parietal temporal subdural acute/subacute infarct. Smaller focus of   acute/subacute infarct in the left occipital lobe.    Occluded left internal carotid artery cavernous and supraclinoid   segments, with moderate to severe narrowing from the left carotid   bifurcation to the left petrous segment. Lack of flow related enhancement   is seen in the left middle cerebral artery, also suspicious for occlusion.    Chronic infarct of the right occipital lobe.    Critical findings were discussed with Dr. Giraldo on 4/1/2017 11:55 AM   with readback.                CRISPIN JERONIMO MD., ATTENDING RADIOLOGIST  This document has been electronically signed. Apr 1 2017 11:55AM                                        IMPRESSION & PLAN:

## 2017-04-01 NOTE — PROGRESS NOTE ADULT - PROBLEM SELECTOR PLAN 1
Given multiple CVAs, suggest TOLU/cardiology evaluation and consider anticoagulation. Also suggest Contacting vascular surgeon re left ICA stenosis. will need  PT and rehab.spoke with Dr. Banerjee.

## 2017-04-01 NOTE — CONSULT NOTE ADULT - PROBLEM SELECTOR PROBLEM 3
Hemianopsia
Uncontrolled other specified diabetes mellitus with complication, unspecified long term insulin use status

## 2017-04-01 NOTE — SPEECH LANGUAGE PATHOLOGY EVALUATION - SLP DIAGNOSIS
Moderate-severe receptive and expressive aphasia based on limited assessment 2* reduced participation; ongoing diagnostic intervention to follow.

## 2017-04-01 NOTE — CONSULT NOTE ADULT - SUBJECTIVE AND OBJECTIVE BOX
48yo F s/p multiple strokes seen in consultation for L ICA stenosis/occlusion.  Pt reports having symptoms last week, becoming aphasic, severe headaches, and now slowly improving.  Pt has history of DM, Hgb A1C 10    NKDA  PMH: HTN, DM  PSH: denies    MEDICATIONS  (STANDING):  sodium chloride 0.9% lock flush 3milliLiter(s) IV Push every 8 hours  pantoprazole    Tablet 40milliGRAM(s) Oral before breakfast  atorvastatin 20milliGRAM(s) Oral at bedtime  aspirin enteric coated 325milliGRAM(s) Oral daily  dextrose 5%. 1000milliLiter(s) IV Continuous <Continuous>  dextrose 50% Injectable 12.5Gram(s) IV Push once  dextrose 50% Injectable 25Gram(s) IV Push once  dextrose 50% Injectable 25Gram(s) IV Push once  insulin lispro (HumaLOG) corrective regimen sliding scale  SubCutaneous three times a day before meals  heparin  Injectable 5000Unit(s) SubCutaneous every 8 hours  insulin glargine Injectable (LANTUS) 15Unit(s) SubCutaneous at bedtime    MEDICATIONS  (PRN):  dextrose Gel 1Dose(s) Oral once PRN Blood Glucose LESS THAN 70 milliGRAM(s)/deciliter  glucagon  Injectable 1milliGRAM(s) IntraMuscular once PRN Glucose LESS THAN 70 milligrams/deciliter  cyclobenzaprine 10milliGRAM(s) Oral three times a day PRN Muscle Spasm  acetaminophen   Tablet. 650milliGRAM(s) Oral every 6 hours PRN Mild Pain (1 - 3)  morphine  - Injectable 2milliGRAM(s) IV Push every 6 hours PRN Severe Pain (7 - 10)      Vital Signs Last 24 Hrs  T(C): 36.9, Max: 36.9 (04-01 @ 04:43)  T(F): 98.5, Max: 98.5 (04-01 @ 12:05)  HR: 98 (69 - 98)  BP: 124/76 (111/59 - 150/57)  BP(mean): --  RR: 16 (15 - 18)  SpO2: 100% (99% - 100%)    PE  Gen: A+O, NAD, slow speech  Pulm: ctabl  CV: RRR, 3/6 systolic ejection murmur  Abd: soft, nontender, nondistended  Ext: moves all extremities  Vasc: 2+ pulses, no bruits appreciated  Neuro: CN 2-12 grossly intact, 5/5 motor strength      I&O's Detail    I & Os for current day (as of 01 Apr 2017 17:15)  =============================================  IN:    Oral Fluid: 600 ml    Total IN: 600 ml  ---------------------------------------------  OUT:    Total OUT: 0 ml  ---------------------------------------------  Total NET: 600 ml      LABS:                        8.8    6.5   )-----------( 207      ( 01 Apr 2017 07:45 )             31.8     01 Apr 2017 07:45    137    |  100    |  9.0    ----------------------------<  298    4.1     |  28.0   |  0.59     Ca    9.1        01 Apr 2017 07:45            RADIOLOGY & ADDITIONAL STUDIES:

## 2017-04-01 NOTE — SPEECH LANGUAGE PATHOLOGY EVALUATION - COMMENTS
Moderate-severe receptive aphasia. Pt with difficulty understanding basic questions and commands. Pt benefits from visual support, models, repetition, use of simple language to improve comprehension Moderate-severe expressive aphasia To assess Cognitive eval deferred 2* severity of language deficits Ongoing assessment; limited overall verbalizations, however dysarthria not suspected. Cannot r/o presence of verbal apraxia.

## 2017-04-01 NOTE — SPEECH LANGUAGE PATHOLOGY EVALUATION - SLP GENERAL OBSERVATIONS
Pt received A&A in bed, holding left side of head and c/o headache. Pt with significant frustration, anger at times, requiring maximal cues for participation in assessment. Overall assessment limited 2* pt's c/o pain, +frustration with assessment stimuli

## 2017-04-01 NOTE — PROGRESS NOTE ADULT - ASSESSMENT
Assessment and Plan:   47 F with PMH HTN, DM II and anemia presented with expressive aphasia, headache and unsteady gait admitted with CVA -multiple left hemispheric infarcts on CT (recent left parietal infarct and an old right posterior parietal infarct) and physical exam with persistent expressive aphasia.     -Acute expressive aphasia Cerebrovascular accident (CVA) due to embolism of pre-cerebral artery.    still has expressive aphasia and headache   MRI and MRA noted for left parietal temporal subdural acute/subacute infarct. Smaller focus of   acute/subacute infarct in the left occipital lobe.   TOLU planned for monday as per cardio. If negative, will need ILR. Will keep pt npo on sunday midnight.   to consider anticoagulation.  c/w stroke protocol   c/w asa/statin/heparin sq   c/w PT and speech therapy/OT to evaluate the patient.     -Occluded left internal carotid artery cavernous with moderate to severe narrowing. Left middle cerebral artery, also suspicious for occlusion.  vascular consulted.   cw asa 325mg and lipitor    - Uncontrolled diabetes mellitus.  HBA1C:10.4  FS elevated     On lantus to 15u sq qhs   -c/w moderate HSS   Pt has never been on injectable insulin will need to be given diabetic education  endocrine consulted   c/w diabetic dash diet.     -Essential hypertension.    bp initially was elevated but now wnl.   will obtain patients outpt bp medication regimen and restart.     - Anemia, iron deficiency  start feso4 325mg po tid.     -Prophylactic measure  DVT ppx with heparin sq.     Dispo: Continue current medical management and patient to be followed by PT/OT and ST.

## 2017-04-01 NOTE — SPEECH LANGUAGE PATHOLOGY EVALUATION - SLP CONVERSATIONAL SPEECH
Occasional appropriate outbursts (i.e.: "I just can't" "I'm not stupid" "This just hurts"), however +word finding deficits, +paraphasic errors, limited overall verbalizations

## 2017-04-02 DIAGNOSIS — D50.9 IRON DEFICIENCY ANEMIA, UNSPECIFIED: ICD-10-CM

## 2017-04-02 LAB
ANION GAP SERPL CALC-SCNC: 13 MMOL/L — SIGNIFICANT CHANGE UP (ref 5–17)
BUN SERPL-MCNC: 14 MG/DL — SIGNIFICANT CHANGE UP (ref 8–20)
CALCIUM SERPL-MCNC: 8.9 MG/DL — SIGNIFICANT CHANGE UP (ref 8.6–10.2)
CHLORIDE SERPL-SCNC: 98 MMOL/L — SIGNIFICANT CHANGE UP (ref 98–107)
CO2 SERPL-SCNC: 26 MMOL/L — SIGNIFICANT CHANGE UP (ref 22–29)
CREAT SERPL-MCNC: 0.69 MG/DL — SIGNIFICANT CHANGE UP (ref 0.5–1.3)
GLUCOSE SERPL-MCNC: 282 MG/DL — HIGH (ref 70–115)
HCT VFR BLD CALC: 32.6 % — LOW (ref 37–47)
HGB BLD-MCNC: 9.3 G/DL — LOW (ref 12–16)
MCHC RBC-ENTMCNC: 19.5 PG — LOW (ref 27–31)
MCHC RBC-ENTMCNC: 28.5 G/DL — LOW (ref 32–36)
MCV RBC AUTO: 68.2 FL — LOW (ref 81–99)
PLATELET # BLD AUTO: 265 K/UL — SIGNIFICANT CHANGE UP (ref 150–400)
POTASSIUM SERPL-MCNC: 4.2 MMOL/L — SIGNIFICANT CHANGE UP (ref 3.5–5.3)
POTASSIUM SERPL-SCNC: 4.2 MMOL/L — SIGNIFICANT CHANGE UP (ref 3.5–5.3)
RBC # BLD: 4.78 M/UL — SIGNIFICANT CHANGE UP (ref 4.4–5.2)
RBC # FLD: 19.9 % — HIGH (ref 11–15.6)
SODIUM SERPL-SCNC: 137 MMOL/L — SIGNIFICANT CHANGE UP (ref 135–145)
WBC # BLD: 7.3 K/UL — SIGNIFICANT CHANGE UP (ref 4.8–10.8)
WBC # FLD AUTO: 7.3 K/UL — SIGNIFICANT CHANGE UP (ref 4.8–10.8)

## 2017-04-02 PROCEDURE — 99233 SBSQ HOSP IP/OBS HIGH 50: CPT

## 2017-04-02 RX ORDER — INSULIN LISPRO 100/ML
6 VIAL (ML) SUBCUTANEOUS ONCE
Qty: 0 | Refills: 0 | Status: COMPLETED | OUTPATIENT
Start: 2017-04-02 | End: 2017-04-02

## 2017-04-02 RX ADMIN — SODIUM CHLORIDE 3 MILLILITER(S): 9 INJECTION INTRAMUSCULAR; INTRAVENOUS; SUBCUTANEOUS at 06:33

## 2017-04-02 RX ADMIN — HEPARIN SODIUM 5000 UNIT(S): 5000 INJECTION INTRAVENOUS; SUBCUTANEOUS at 23:04

## 2017-04-02 RX ADMIN — Medication 325 MILLIGRAM(S): at 08:26

## 2017-04-02 RX ADMIN — ATORVASTATIN CALCIUM 20 MILLIGRAM(S): 80 TABLET, FILM COATED ORAL at 23:04

## 2017-04-02 RX ADMIN — Medication 6: at 18:20

## 2017-04-02 RX ADMIN — Medication 6: at 08:26

## 2017-04-02 RX ADMIN — SODIUM CHLORIDE 3 MILLILITER(S): 9 INJECTION INTRAMUSCULAR; INTRAVENOUS; SUBCUTANEOUS at 13:01

## 2017-04-02 RX ADMIN — Medication 650 MILLIGRAM(S): at 17:20

## 2017-04-02 RX ADMIN — MORPHINE SULFATE 2 MILLIGRAM(S): 50 CAPSULE, EXTENDED RELEASE ORAL at 12:59

## 2017-04-02 RX ADMIN — Medication 6: at 13:00

## 2017-04-02 RX ADMIN — MORPHINE SULFATE 2 MILLIGRAM(S): 50 CAPSULE, EXTENDED RELEASE ORAL at 20:33

## 2017-04-02 RX ADMIN — Medication 650 MILLIGRAM(S): at 18:12

## 2017-04-02 RX ADMIN — Medication 325 MILLIGRAM(S): at 12:59

## 2017-04-02 RX ADMIN — MORPHINE SULFATE 2 MILLIGRAM(S): 50 CAPSULE, EXTENDED RELEASE ORAL at 14:24

## 2017-04-02 RX ADMIN — MORPHINE SULFATE 2 MILLIGRAM(S): 50 CAPSULE, EXTENDED RELEASE ORAL at 06:45

## 2017-04-02 RX ADMIN — MORPHINE SULFATE 2 MILLIGRAM(S): 50 CAPSULE, EXTENDED RELEASE ORAL at 20:18

## 2017-04-02 RX ADMIN — Medication 325 MILLIGRAM(S): at 18:20

## 2017-04-02 RX ADMIN — CYCLOBENZAPRINE HYDROCHLORIDE 10 MILLIGRAM(S): 10 TABLET, FILM COATED ORAL at 23:44

## 2017-04-02 RX ADMIN — Medication 325 MILLIGRAM(S): at 13:00

## 2017-04-02 RX ADMIN — INSULIN GLARGINE 15 UNIT(S): 100 INJECTION, SOLUTION SUBCUTANEOUS at 23:04

## 2017-04-02 RX ADMIN — HEPARIN SODIUM 5000 UNIT(S): 5000 INJECTION INTRAVENOUS; SUBCUTANEOUS at 13:00

## 2017-04-02 RX ADMIN — MORPHINE SULFATE 2 MILLIGRAM(S): 50 CAPSULE, EXTENDED RELEASE ORAL at 08:00

## 2017-04-02 RX ADMIN — SODIUM CHLORIDE 3 MILLILITER(S): 9 INJECTION INTRAMUSCULAR; INTRAVENOUS; SUBCUTANEOUS at 22:55

## 2017-04-02 RX ADMIN — Medication 6 UNIT(S): at 23:45

## 2017-04-02 RX ADMIN — PANTOPRAZOLE SODIUM 40 MILLIGRAM(S): 20 TABLET, DELAYED RELEASE ORAL at 06:39

## 2017-04-02 RX ADMIN — HEPARIN SODIUM 5000 UNIT(S): 5000 INJECTION INTRAVENOUS; SUBCUTANEOUS at 06:39

## 2017-04-02 NOTE — PROGRESS NOTE ADULT - SUBJECTIVE AND OBJECTIVE BOX
[facility] - [facility]                                   [address] [place] [address]                                                      PHONE: [phone]                                                         FAX: [phone]  [email or web address]  ---------------------------------------------------------------------------------------------------------------------------------    Overnight events/patient complaints: Speech improved per pt, but still with difficulty/dysarthria      No Known Allergies    MEDICATIONS  (STANDING):  sodium chloride 0.9% lock flush 3milliLiter(s) IV Push every 8 hours  pantoprazole    Tablet 40milliGRAM(s) Oral before breakfast  atorvastatin 20milliGRAM(s) Oral at bedtime  aspirin enteric coated 325milliGRAM(s) Oral daily  dextrose 5%. 1000milliLiter(s) IV Continuous <Continuous>  dextrose 50% Injectable 12.5Gram(s) IV Push once  dextrose 50% Injectable 25Gram(s) IV Push once  dextrose 50% Injectable 25Gram(s) IV Push once  insulin lispro (HumaLOG) corrective regimen sliding scale  SubCutaneous three times a day before meals  heparin  Injectable 5000Unit(s) SubCutaneous every 8 hours  insulin glargine Injectable (LANTUS) 15Unit(s) SubCutaneous at bedtime  ferrous    sulfate 325milliGRAM(s) Oral three times a day with meals    MEDICATIONS  (PRN):  dextrose Gel 1Dose(s) Oral once PRN Blood Glucose LESS THAN 70 milliGRAM(s)/deciliter  glucagon  Injectable 1milliGRAM(s) IntraMuscular once PRN Glucose LESS THAN 70 milligrams/deciliter  cyclobenzaprine 10milliGRAM(s) Oral three times a day PRN Muscle Spasm  acetaminophen   Tablet. 650milliGRAM(s) Oral every 6 hours PRN Mild Pain (1 - 3)  morphine  - Injectable 2milliGRAM(s) IV Push every 6 hours PRN Severe Pain (7 - 10)      Vital Signs Last 24 Hrs  T(C): 36.7, Max: 37.1 (04-01 @ 20:00)  T(F): 98.1, Max: 98.7 (04-01 @ 20:00)  HR: 70 (70 - 78)  BP: 130/73 (120/68 - 139/76)  BP(mean): 87 (87 - 90)  RR: 18 (15 - 18)  SpO2: 98% (98% - 100%)  ICU Vital Signs Last 24 Hrs  ALEX MALDONADO  I&O's Detail    I & Os for current day (as of 02 Apr 2017 08:58)  =============================================  IN:    Oral Fluid: 480 ml    Total IN: 480 ml  ---------------------------------------------  OUT:    Total OUT: 0 ml  ---------------------------------------------  Total NET: 480 ml    I&O's Summary    I & Os for current day (as of 02 Apr 2017 08:58)  =============================================  IN: 480 ml / OUT: 0 ml / NET: 480 ml    Drug Dosing Weight  ALEX ERICA      PHYSICAL EXAM:  General: Appears well developed, well nourished alert and cooperative.  HEENT: Head; normocephalic, atraumatic.  Eyes: Pupils reactive, cornea wnl.  Neck: Supple, no nodes adenopathy, no NVD or carotid bruit or thyromegaly.  CARDIOVASCULAR: Normal S1 and S2, No murmur, rub, gallop or lift.   LUNGS: No rales, rhonchi or wheeze. Normal breath sounds bilaterally.  ABDOMEN: Soft, nontender without mass or organomegaly. bowel sounds normoactive.  EXTREMITIES: No clubbing, cyanosis or edema. Distal pulses wnl.   SKIN: warm and dry with normal turgor.  NEURO: dysarthric   PSYCH: normal affect.        LABS:                        9.3    7.3   )-----------( 265      ( 02 Apr 2017 07:40 )             32.6     02 Apr 2017 07:40    137    |  98     |  14.0   ----------------------------<  282    4.2     |  26.0   |  0.69     Ca    8.9        02 Apr 2017 07:40    ALEX MALDONADO    RADIOLOGY & ADDITIONAL STUDIES:    INTERPRETATION OF TELEMETRY (personally reviewed): sinus rhythm        ASSESSMENT AND PLAN:  In summary, ALEX MALDONADO is an 47y Female with past medical history admitted with acute CVA.  Plan for TOLU tomorrow and likely loop recorder prior to discharge.  Continue ASA.  NPO p MN.

## 2017-04-02 NOTE — PROGRESS NOTE ADULT - SUBJECTIVE AND OBJECTIVE BOX
INTERVAL HISTORY:  Seen at bedside; stable; comfortable.    No Known Allergies      VITAL SIGNS:  Vital Signs Last 24 Hrs  T(C): 36.7, Max: 37.1 (04-01 @ 20:00)  T(F): 98.1, Max: 98.7 (04-01 @ 20:00)  HR: 72 (70 - 78)  BP: 127/50 (120/68 - 139/76)  BP(mean): 73 (73 - 90)  RR: 14 (14 - 18)  SpO2: 100% (98% - 100%)    PHYSICAL EXAMINATION:  General: Well-developed, well nourished, in no acute distress.  Eyes: Conjunctiva and sclera clear.    Neurological Exam:  Patient is asleep and awakes. expressive aphasia. Follows one step commands. There is right lower quadrant visual deficit.    Pupils are equal and reactive. Extra occular  muscles are intact. There is mild/? right facial droop.    On motor examination right upper an right lower is 4/5 and there is  pronator drift. Left upper and left lower is 5/5. Sensory is decreased to pinprick right upper and right lower. DTR are 2/4 all 4 extremities. Babinski is equivocal on right.      MEDS:  MEDICATIONS  (STANDING):  sodium chloride 0.9% lock flush 3milliLiter(s) IV Push every 8 hours  pantoprazole    Tablet 40milliGRAM(s) Oral before breakfast  atorvastatin 20milliGRAM(s) Oral at bedtime  aspirin enteric coated 325milliGRAM(s) Oral daily  dextrose 5%. 1000milliLiter(s) IV Continuous <Continuous>  dextrose 50% Injectable 12.5Gram(s) IV Push once  dextrose 50% Injectable 25Gram(s) IV Push once  dextrose 50% Injectable 25Gram(s) IV Push once  insulin lispro (HumaLOG) corrective regimen sliding scale  SubCutaneous three times a day before meals  heparin  Injectable 5000Unit(s) SubCutaneous every 8 hours  insulin glargine Injectable (LANTUS) 15Unit(s) SubCutaneous at bedtime  ferrous    sulfate 325milliGRAM(s) Oral three times a day with meals    MEDICATIONS  (PRN):  dextrose Gel 1Dose(s) Oral once PRN Blood Glucose LESS THAN 70 milliGRAM(s)/deciliter  glucagon  Injectable 1milliGRAM(s) IntraMuscular once PRN Glucose LESS THAN 70 milligrams/deciliter  cyclobenzaprine 10milliGRAM(s) Oral three times a day PRN Muscle Spasm  acetaminophen   Tablet. 650milliGRAM(s) Oral every 6 hours PRN Mild Pain (1 - 3)  morphine  - Injectable 2milliGRAM(s) IV Push every 6 hours PRN Severe Pain (7 - 10)      LABS:                          9.3    7.3   )-----------( 265      ( 02 Apr 2017 07:40 )             32.6     02 Apr 2017 07:40    137    |  98     |  14.0   ----------------------------<  282    4.2     |  26.0   |  0.69     Ca    8.9        02 Apr 2017 07:40    IMPRESSION:

## 2017-04-02 NOTE — PROGRESS NOTE ADULT - SUBJECTIVE AND OBJECTIVE BOX
ALEX MALDONADOCHILO-[id number]    Patient is a 47y old  Female who presents with a chief complaint of unable to speak properly x1 week (31 Mar 2017 03:05)    HPI:  46 y/o female with h/oDM II, HTN, was noticed by family that she is unable to express her self in clear words and she leans to the left whenevr she walks for the last week. CT brain shows a recent left parietal infarct and an old right posterior parietal infarct. c/o left side headache.no price weakness  in the ER, patient has an expressive aphasia. o/e: systolic heart murmur. hgb: 9.1 (30 Mar 2017 05:24)    ROS:     HEALTH ISSUES - PROBLEM Dx:  Occlusion of left carotid artery: Occlusion of left carotid artery  Uncontrolled other specified diabetes mellitus with complication, unspecified long term insulin use status: Uncontrolled other specified diabetes mellitus with complication, unspecified long term insulin use status  Uncontrolled diabetes mellitus: Uncontrolled diabetes mellitus  Prophylactic measure: Prophylactic measure  Hemianopsia: Hemianopsia  Aphasia complicating stroke: Aphasia complicating stroke  Cerebrovascular accident (CVA) due to other mechanism: Cerebrovascular accident (CVA) due to other mechanism  Anemia, unspecified type: Anemia, unspecified type  Gastroesophageal reflux disease without esophagitis: Gastroesophageal reflux disease without esophagitis  Heart murmur: Heart murmur  Essential hypertension: Essential hypertension  Type 2 diabetes mellitus with complication, without long-term current use of insulin: Type 2 diabetes mellitus with complication, without long-term current use of insulin  Cerebrovascular accident (CVA) due to embolism of precerebral artery: Cerebrovascular accident (CVA) due to embolism of precerebral artery    MEDICATIONS  (STANDING):  sodium chloride 0.9% lock flush 3milliLiter(s) IV Push every 8 hours  pantoprazole    Tablet 40milliGRAM(s) Oral before breakfast  atorvastatin 20milliGRAM(s) Oral at bedtime  aspirin enteric coated 325milliGRAM(s) Oral daily  dextrose 5%. 1000milliLiter(s) IV Continuous <Continuous>  dextrose 50% Injectable 12.5Gram(s) IV Push once  dextrose 50% Injectable 25Gram(s) IV Push once  dextrose 50% Injectable 25Gram(s) IV Push once  insulin lispro (HumaLOG) corrective regimen sliding scale  SubCutaneous three times a day before meals  heparin  Injectable 5000Unit(s) SubCutaneous every 8 hours  insulin glargine Injectable (LANTUS) 15Unit(s) SubCutaneous at bedtime  ferrous    sulfate 325milliGRAM(s) Oral three times a day with meals    MEDICATIONS  (PRN):  dextrose Gel 1Dose(s) Oral once PRN Blood Glucose LESS THAN 70 milliGRAM(s)/deciliter  glucagon  Injectable 1milliGRAM(s) IntraMuscular once PRN Glucose LESS THAN 70 milligrams/deciliter  cyclobenzaprine 10milliGRAM(s) Oral three times a day PRN Muscle Spasm  acetaminophen   Tablet. 650milliGRAM(s) Oral every 6 hours PRN Mild Pain (1 - 3)  morphine  - Injectable 2milliGRAM(s) IV Push every 6 hours PRN Severe Pain (7 - 10)    LABS:                9.3    7.3   )-----------( 265      ( 02 Apr 2017 07:40 )             32.6     02 Apr 2017 07:40    137    |  98     |  14.0   ----------------------------<  282    4.2     |  26.0   |  0.69     Ca    8.9        02 Apr 2017 07:40    PHYSICAL EXAM:  Vital Signs Last 24 Hrs  T(C): 36.7, Max: 37.1 (04-01 @ 20:00)  T(F): 98.1, Max: 98.7 (04-01 @ 20:00)  HR: 70 (70 - 78)  BP: 130/73 (120/68 - 139/76)  BP(mean): 87 (87 - 90)  RR: 18 (15 - 18)  SpO2: 98% (98% - 100%)    Constitutional:  Eyes:  ENMT:  Neck:  Breasts:  Back:  Respiratory:  Cardiovascular:  Gastrointestinal:  Genitourinary:  Rectal:  Extremities:  Vascular:  Neurological:  Skin:  Lymph Nodes:  Musculoskeletal:  Psychiatric:  Skin: warm dry  Psychiatric: appropriate ALEX MALDONADOCHILO-[id number]    Patient is a 47y old  Female who presents with a chief complaint of unable to speak properly x1 week (31 Mar 2017 03:05)    HPI:  48 y/o female with h/oDM II, HTN, was noticed by family that she is unable to express her self in clear words and she leans to the left whenevr she walks for the last week. CT brain shows a recent left parietal infarct and an old right posterior parietal infarct. c/o left side headache.no price weakness  in the ER, patient has an expressive aphasia. o/e: systolic heart murmur. hgb: 9.1 (30 Mar 2017 05:24)    ROS:     HEALTH ISSUES - PROBLEM Dx:  Occlusion of left carotid artery: Occlusion of left carotid artery  Uncontrolled other specified diabetes mellitus with complication, unspecified long term insulin use status: Uncontrolled other specified diabetes mellitus with complication, unspecified long term insulin use status  Uncontrolled diabetes mellitus: Uncontrolled diabetes mellitus  Prophylactic measure: Prophylactic measure  Hemianopsia: Hemianopsia  Aphasia complicating stroke: Aphasia complicating stroke  Cerebrovascular accident (CVA) due to other mechanism: Cerebrovascular accident (CVA) due to other mechanism  Anemia, unspecified type: Anemia, unspecified type  Gastroesophageal reflux disease without esophagitis: Gastroesophageal reflux disease without esophagitis  Heart murmur: Heart murmur  Essential hypertension: Essential hypertension  Type 2 diabetes mellitus with complication, without long-term current use of insulin: Type 2 diabetes mellitus with complication, without long-term current use of insulin  Cerebrovascular accident (CVA) due to embolism of precerebral artery: Cerebrovascular accident (CVA) due to embolism of precerebral artery    PAST MEDICAL & SURGICAL HISTORY:  Acid reflux  HTN (hypertension)  Diabetes  No pertinent past medical history  No significant past surgical history    MEDICATIONS  (STANDING):  sodium chloride 0.9% lock flush 3milliLiter(s) IV Push every 8 hours  pantoprazole    Tablet 40milliGRAM(s) Oral before breakfast  atorvastatin 20milliGRAM(s) Oral at bedtime  aspirin enteric coated 325milliGRAM(s) Oral daily  dextrose 5%. 1000milliLiter(s) IV Continuous <Continuous>  dextrose 50% Injectable 12.5Gram(s) IV Push once  dextrose 50% Injectable 25Gram(s) IV Push once  dextrose 50% Injectable 25Gram(s) IV Push once  insulin lispro (HumaLOG) corrective regimen sliding scale  SubCutaneous three times a day before meals  heparin  Injectable 5000Unit(s) SubCutaneous every 8 hours  insulin glargine Injectable (LANTUS) 15Unit(s) SubCutaneous at bedtime  ferrous    sulfate 325milliGRAM(s) Oral three times a day with meals    MEDICATIONS  (PRN):  dextrose Gel 1Dose(s) Oral once PRN Blood Glucose LESS THAN 70 milliGRAM(s)/deciliter  glucagon  Injectable 1milliGRAM(s) IntraMuscular once PRN Glucose LESS THAN 70 milligrams/deciliter  cyclobenzaprine 10milliGRAM(s) Oral three times a day PRN Muscle Spasm  acetaminophen   Tablet. 650milliGRAM(s) Oral every 6 hours PRN Mild Pain (1 - 3)  morphine  - Injectable 2milliGRAM(s) IV Push every 6 hours PRN Severe Pain (7 - 10)    LABS:                9.3    7.3   )-----------( 265      ( 02 Apr 2017 07:40 )             32.6     02 Apr 2017 07:40    137    |  98     |  14.0   ----------------------------<  282    4.2     |  26.0   |  0.69     Ca    8.9        02 Apr 2017 07:40    PHYSICAL EXAM:  Vital Signs Last 24 Hrs  T(C): 36.8, Max: 37.1 (04-01 @ 20:00)  T(F): 98.3, Max: 98.7 (04-01 @ 20:00)  HR: 72 (70 - 78)  BP: 127/50 (127/50 - 139/76)  BP(mean): 73 (73 - 90)  RR: 14 (14 - 18)  SpO2: 100% (98% - 100%)    GENERAL NAD, ALERT LAYING IN BED/CHAIR  HEENT NORMAL CEPHALIC ATRAUMATIC, EOMI, PERRLA  CVS: S1,S2,RRR, NO MURMUR, NO RUBS, NO GALLOPS  RESP: BL CTA, NO WHEEZING, NO RHONCHI, NO CRACKLES, NO LABORED BREATHING  ABD: SOFT, NON TENDER, NON DISTENDED, +BOWEL SOUNDS IN ALL 4 QUADRANTS  EXT: NO EDEMA  SKIN: WARM, DRY, INTACT  NEURO AOX3  MUSCULOSKELETAL: ROM INTACT IN ALL 4 EXTREMITIES  RECTAL DEFFERED  PSYCH APPROPRIATE ALEX MALDONADOCHILO-[id number]    Patient is a 47y old  Female who presents with a chief complaint of unable to speak properly x1 week (31 Mar 2017 03:05)    HPI:  46 y/o female with h/oDM II, HTN, was noticed by family that she is unable to express her self in clear words and she leans to the left whenevr she walks for the last week. CT brain shows a recent left parietal infarct and an old right posterior parietal infarct. c/o left side headache.no price weakness  in the ER, patient has an expressive aphasia. o/e: systolic heart murmur. hgb: 9.1 (30 Mar 2017 05:24)  Today pt notes that she can speak more clearly than yesterday.     ROS:   no chest pain, no sob. +headache. no vomiting    HEALTH ISSUES - PROBLEM Dx:  Occlusion of left carotid artery: Occlusion of left carotid artery  Uncontrolled other specified diabetes mellitus with complication, unspecified long term insulin use status: Uncontrolled other specified diabetes mellitus with complication, unspecified long term insulin use status  Uncontrolled diabetes mellitus: Uncontrolled diabetes mellitus  Prophylactic measure: Prophylactic measure  Hemianopsia: Hemianopsia  Aphasia complicating stroke: Aphasia complicating stroke  Cerebrovascular accident (CVA) due to other mechanism: Cerebrovascular accident (CVA) due to other mechanism  Anemia, unspecified type: Anemia, unspecified type  Gastroesophageal reflux disease without esophagitis: Gastroesophageal reflux disease without esophagitis  Heart murmur: Heart murmur  Essential hypertension: Essential hypertension  Type 2 diabetes mellitus with complication, without long-term current use of insulin: Type 2 diabetes mellitus with complication, without long-term current use of insulin  Cerebrovascular accident (CVA) due to embolism of precerebral artery: Cerebrovascular accident (CVA) due to embolism of precerebral artery    PAST MEDICAL & SURGICAL HISTORY:  Acid reflux  HTN (hypertension)  Diabetes  No pertinent past medical history  No significant past surgical history    MEDICATIONS  (STANDING):  sodium chloride 0.9% lock flush 3milliLiter(s) IV Push every 8 hours  pantoprazole    Tablet 40milliGRAM(s) Oral before breakfast  atorvastatin 20milliGRAM(s) Oral at bedtime  aspirin enteric coated 325milliGRAM(s) Oral daily  dextrose 5%. 1000milliLiter(s) IV Continuous <Continuous>  dextrose 50% Injectable 12.5Gram(s) IV Push once  dextrose 50% Injectable 25Gram(s) IV Push once  dextrose 50% Injectable 25Gram(s) IV Push once  insulin lispro (HumaLOG) corrective regimen sliding scale  SubCutaneous three times a day before meals  heparin  Injectable 5000Unit(s) SubCutaneous every 8 hours  insulin glargine Injectable (LANTUS) 15Unit(s) SubCutaneous at bedtime  ferrous    sulfate 325milliGRAM(s) Oral three times a day with meals    MEDICATIONS  (PRN):  dextrose Gel 1Dose(s) Oral once PRN Blood Glucose LESS THAN 70 milliGRAM(s)/deciliter  glucagon  Injectable 1milliGRAM(s) IntraMuscular once PRN Glucose LESS THAN 70 milligrams/deciliter  cyclobenzaprine 10milliGRAM(s) Oral three times a day PRN Muscle Spasm  acetaminophen   Tablet. 650milliGRAM(s) Oral every 6 hours PRN Mild Pain (1 - 3)  morphine  - Injectable 2milliGRAM(s) IV Push every 6 hours PRN Severe Pain (7 - 10)    LABS:                9.3    7.3   )-----------( 265      ( 02 Apr 2017 07:40 )             32.6     02 Apr 2017 07:40    137    |  98     |  14.0   ----------------------------<  282    4.2     |  26.0   |  0.69     Ca    8.9        02 Apr 2017 07:40    PHYSICAL EXAM:  Vital Signs Last 24 Hrs  T(C): 36.8, Max: 37.1 (04-01 @ 20:00)  T(F): 98.3, Max: 98.7 (04-01 @ 20:00)  HR: 72 (70 - 78)  BP: 127/50 (127/50 - 139/76)  BP(mean): 73 (73 - 90)  RR: 14 (14 - 18)  SpO2: 100% (98% - 100%)    GENERAL NAD, ALERT LAYING IN BED  HEENT NORMAL CEPHALIC ATRAUMATIC  CVS: S1,S2,RRR, NO MURMUR, NO RUBS, NO GALLOPS  RESP: BL CTA, NO WHEEZING, NO RHONCHI, NO CRACKLES, NO LABORED BREATHING  SKIN: WARM, DRY, INTACT  NEURO Alert. oriented  RECTAL DEFFERED  PSYCH APPROPRIATE, +word finding difficulty but more fluent than yesterday.

## 2017-04-02 NOTE — PROGRESS NOTE ADULT - PROBLEM SELECTOR PLAN 1
still has expressive aphasia and headache   MRI and MRA noted for left parietal temporal subdural acute/subacute infarct.   Smaller focus of acute/subacute infarct in the left occipital lobe.   TOLU planned for monday as per cardio. If negative, will need ILR. Will keep pt npo on sunday midnight.   to consider anticoagulation.  c/w stroke protocol   c/w asa/statin/heparin sq   c/w PT and speech therapy/OT to evaluate the patient. still has expressive aphasia and headache but improved from yesterday  MRI and MRA noted for left parietal temporal subdural acute/subacute infarct.   Smaller focus of acute/subacute infarct in the left occipital lobe.   TOLU planned for monday as per cardio. If negative, will need ILR. Will keep pt npo on sunday midnight.   to consider anticoagulation.  c/w stroke protocol   c/w asa/statin/heparin sq   c/w PT and speech therapy/OT to evaluate the patient.

## 2017-04-02 NOTE — PROGRESS NOTE ADULT - PROBLEM SELECTOR PLAN 1
Await for TOLU.  On ASA and to consider antoicoagulation per cardiology.  PT/OT rehab.  DVT prophylaxis.  Risk factor management.  Will follow.

## 2017-04-02 NOTE — PROGRESS NOTE ADULT - ASSESSMENT
Assessment and Plan:   47 F with PMH HTN, DM II and anemia presented with expressive aphasia, headache and unsteady gait admitted with CVA -multiple left hemispheric infarcts on CT (recent left parietal infarct and an old right posterior parietal infarct) and physical exam with persistent expressive aphasia.     -Acute expressive aphasia Cerebrovascular accident (CVA) due to embolism of pre-cerebral artery.    still has expressive aphasia and headache   MRI and MRA noted for left parietal temporal subdural acute/subacute infarct. Smaller focus of   acute/subacute infarct in the left occipital lobe.   TOLU planned for monday as per cardio. If negative, will need ILR. Will keep pt npo on sunday midnight.   to consider anticoagulation.  c/w stroke protocol   c/w asa/statin/heparin sq   c/w PT and speech therapy/OT to evaluate the patient.     -Occluded left internal carotid artery cavernous with moderate to severe narrowing. Left middle cerebral artery, also suspicious for occlusion.  vascular consulted.   cw asa 325mg and lipitor    - Uncontrolled diabetes mellitus.  HBA1C:10.4  FS elevated     On lantus to 15u sq qhs   -c/w moderate HSS   Pt has never been on injectable insulin will need to be given diabetic education  endocrine consulted   c/w diabetic dash diet.     -Essential hypertension.    bp initially was elevated but now wnl.   will obtain patients outpt bp medication regimen and restart.     - Anemia, iron deficiency  start feso4 325mg po tid.     -Prophylactic measure  DVT ppx with heparin sq.     Dispo: Continue current medical management and patient to be followed by PT/OT and ST. Assessment and Plan:   47 F with PMH HTN, DM II and anemia presented with expressive aphasia, headache and unsteady gait admitted with CVA -multiple left hemispheric infarcts on CT (recent left parietal infarct and an old right posterior parietal infarct) and physical exam with persistent expressive aphasia.

## 2017-04-03 LAB
ANION GAP SERPL CALC-SCNC: 10 MMOL/L — SIGNIFICANT CHANGE UP (ref 5–17)
BUN SERPL-MCNC: 12 MG/DL — SIGNIFICANT CHANGE UP (ref 8–20)
CALCIUM SERPL-MCNC: 8.9 MG/DL — SIGNIFICANT CHANGE UP (ref 8.6–10.2)
CHLORIDE SERPL-SCNC: 101 MMOL/L — SIGNIFICANT CHANGE UP (ref 98–107)
CO2 SERPL-SCNC: 28 MMOL/L — SIGNIFICANT CHANGE UP (ref 22–29)
CREAT SERPL-MCNC: 0.52 MG/DL — SIGNIFICANT CHANGE UP (ref 0.5–1.3)
GLUCOSE SERPL-MCNC: 186 MG/DL — HIGH (ref 70–115)
HCG UR QL: NEGATIVE — SIGNIFICANT CHANGE UP
HCT VFR BLD CALC: 31.9 % — LOW (ref 37–47)
HGB BLD-MCNC: 9 G/DL — LOW (ref 12–16)
INR BLD: 1.04 RATIO — SIGNIFICANT CHANGE UP (ref 0.88–1.16)
MCHC RBC-ENTMCNC: 19.3 PG — LOW (ref 27–31)
MCHC RBC-ENTMCNC: 28.2 G/DL — LOW (ref 32–36)
MCV RBC AUTO: 68.5 FL — LOW (ref 81–99)
PLATELET # BLD AUTO: 239 K/UL — SIGNIFICANT CHANGE UP (ref 150–400)
POTASSIUM SERPL-MCNC: 3.8 MMOL/L — SIGNIFICANT CHANGE UP (ref 3.5–5.3)
POTASSIUM SERPL-SCNC: 3.8 MMOL/L — SIGNIFICANT CHANGE UP (ref 3.5–5.3)
PROTHROM AB SERPL-ACNC: 11.5 SEC — SIGNIFICANT CHANGE UP (ref 9.8–12.7)
RBC # BLD: 4.66 M/UL — SIGNIFICANT CHANGE UP (ref 4.4–5.2)
RBC # FLD: 19.8 % — HIGH (ref 11–15.6)
SODIUM SERPL-SCNC: 139 MMOL/L — SIGNIFICANT CHANGE UP (ref 135–145)
WBC # BLD: 7.1 K/UL — SIGNIFICANT CHANGE UP (ref 4.8–10.8)
WBC # FLD AUTO: 7.1 K/UL — SIGNIFICANT CHANGE UP (ref 4.8–10.8)

## 2017-04-03 PROCEDURE — 99233 SBSQ HOSP IP/OBS HIGH 50: CPT

## 2017-04-03 RX ADMIN — Medication 4: at 16:06

## 2017-04-03 RX ADMIN — PANTOPRAZOLE SODIUM 40 MILLIGRAM(S): 20 TABLET, DELAYED RELEASE ORAL at 06:21

## 2017-04-03 RX ADMIN — Medication 4: at 06:21

## 2017-04-03 RX ADMIN — Medication 650 MILLIGRAM(S): at 02:02

## 2017-04-03 RX ADMIN — HEPARIN SODIUM 5000 UNIT(S): 5000 INJECTION INTRAVENOUS; SUBCUTANEOUS at 21:24

## 2017-04-03 RX ADMIN — CYCLOBENZAPRINE HYDROCHLORIDE 10 MILLIGRAM(S): 10 TABLET, FILM COATED ORAL at 23:35

## 2017-04-03 RX ADMIN — INSULIN GLARGINE 15 UNIT(S): 100 INJECTION, SOLUTION SUBCUTANEOUS at 21:24

## 2017-04-03 RX ADMIN — SODIUM CHLORIDE 3 MILLILITER(S): 9 INJECTION INTRAMUSCULAR; INTRAVENOUS; SUBCUTANEOUS at 21:20

## 2017-04-03 RX ADMIN — SODIUM CHLORIDE 3 MILLILITER(S): 9 INJECTION INTRAMUSCULAR; INTRAVENOUS; SUBCUTANEOUS at 05:39

## 2017-04-03 RX ADMIN — HEPARIN SODIUM 5000 UNIT(S): 5000 INJECTION INTRAVENOUS; SUBCUTANEOUS at 12:19

## 2017-04-03 RX ADMIN — SODIUM CHLORIDE 3 MILLILITER(S): 9 INJECTION INTRAMUSCULAR; INTRAVENOUS; SUBCUTANEOUS at 12:19

## 2017-04-03 RX ADMIN — Medication 325 MILLIGRAM(S): at 12:19

## 2017-04-03 RX ADMIN — Medication 650 MILLIGRAM(S): at 22:25

## 2017-04-03 RX ADMIN — MORPHINE SULFATE 2 MILLIGRAM(S): 50 CAPSULE, EXTENDED RELEASE ORAL at 04:03

## 2017-04-03 RX ADMIN — MORPHINE SULFATE 2 MILLIGRAM(S): 50 CAPSULE, EXTENDED RELEASE ORAL at 13:36

## 2017-04-03 RX ADMIN — Medication 650 MILLIGRAM(S): at 21:25

## 2017-04-03 RX ADMIN — MORPHINE SULFATE 2 MILLIGRAM(S): 50 CAPSULE, EXTENDED RELEASE ORAL at 19:07

## 2017-04-03 RX ADMIN — Medication 6: at 12:19

## 2017-04-03 RX ADMIN — MORPHINE SULFATE 2 MILLIGRAM(S): 50 CAPSULE, EXTENDED RELEASE ORAL at 18:52

## 2017-04-03 RX ADMIN — HEPARIN SODIUM 5000 UNIT(S): 5000 INJECTION INTRAVENOUS; SUBCUTANEOUS at 06:21

## 2017-04-03 RX ADMIN — MORPHINE SULFATE 2 MILLIGRAM(S): 50 CAPSULE, EXTENDED RELEASE ORAL at 04:18

## 2017-04-03 RX ADMIN — MORPHINE SULFATE 2 MILLIGRAM(S): 50 CAPSULE, EXTENDED RELEASE ORAL at 12:19

## 2017-04-03 RX ADMIN — Medication 325 MILLIGRAM(S): at 17:17

## 2017-04-03 RX ADMIN — ATORVASTATIN CALCIUM 20 MILLIGRAM(S): 80 TABLET, FILM COATED ORAL at 21:24

## 2017-04-03 RX ADMIN — Medication 650 MILLIGRAM(S): at 01:02

## 2017-04-03 NOTE — PROGRESS NOTE ADULT - PROBLEM SELECTOR PLAN 3
-bp
-bp initially was elevated but manual was wnl   -will obtain patients outpt bp medication regimen and restart
Uncontrolled diabetes mellitus.  HBA1C:10.4  FS elevated     On lantus to 15u sq qhs   -c/w moderate HSS   Pt has never been on injectable insulin will need to be given diabetic education  endocrine consulted   c/w diabetic dash diet.
Uncontrolled diabetes mellitus.  HBA1C:10.4  FS elevated     On lantus to 15u sq qhs   -c/w moderate HSS   Pt has never been on injectable insulin will need to be given diabetic education  endocrine consulted   c/w diabetic dash diet.

## 2017-04-03 NOTE — PROGRESS NOTE ADULT - SUBJECTIVE AND OBJECTIVE BOX
INTERVAL HISTORY:  some improvement      VITAL SIGNS:  Vital Signs Last 24 Hrs  T(C): 36.4, Max: 36.8 (04-02 @ 20:30)  T(F): 97.6, Max: 98.3 (04-02 @ 20:30)  HR: 61 (61 - 86)  BP: 139/44 (116/51 - 172/67)  BP(mean): 92 (68 - 96)  RR: 16 (12 - 24)  SpO2: 99% (97% - 99%)    PHYSICAL EXAMINATION:    Mentation:  awake cooperative, s  Language/Speech: decreaed fluency but improved. Comprehensible. Follows commands  CN: nl  Visual Fields: right inferior quad defect- improved too  Motor: 5/5 x 4  Sensory:  DTR:  Babinski: down       MEDS:  MEDICATIONS  (STANDING):  aspirin 325milliGRAM(s) Oral daily  insulin lispro (HumaLOG) corrective regimen sliding scale  SubCutaneous three times a day before meals  dextrose 5%. 1000milliLiter(s) IV Continuous <Continuous>  dextrose 50% Injectable 12.5Gram(s) IV Push once  dextrose 50% Injectable 25Gram(s) IV Push once  dextrose 50% Injectable 25Gram(s) IV Push once  clopidogrel Tablet 75milliGRAM(s) Oral daily  furosemide    Tablet 20milliGRAM(s) Oral daily  pantoprazole    Tablet 40milliGRAM(s) Oral before breakfast  levothyroxine 25MICROGram(s) Oral daily  enoxaparin Injectable 40milliGRAM(s) SubCutaneous daily  atorvastatin 40milliGRAM(s) Oral at bedtime  insulin glargine Injectable (LANTUS) 15Unit(s) SubCutaneous at bedtime  enalapril 20milliGRAM(s) Oral two times a day  metoprolol succinate ER 100milliGRAM(s) Oral daily  lidocaine   Patch 1Patch Transdermal daily  docusate sodium 100milliGRAM(s) Oral two times a day  senna 2Tablet(s) Oral at bedtime    MEDICATIONS  (PRN):  dextrose Gel 1Dose(s) Oral once PRN Blood Glucose LESS THAN 70 milliGRAM(s)/deciliter  glucagon  Injectable 1milliGRAM(s) IntraMuscular once PRN Glucose LESS THAN 70 milligrams/deciliter  hydrALAZINE Injectable 10milliGRAM(s) IV Push every 8 hours PRN systolic blood pressure greater than 160 mm hg  morphine  - Injectable 2milliGRAM(s) IV Push every 6 hours PRN Severe Pain (7 - 10)  ALPRAZolam 0.25milliGRAM(s) Oral daily PRN anxiety  HYDROcodone  5 mG/acetaminophen 325 mG 2Tablet(s) Oral four times a day PRN Moderate Pain (4 - 6)      LABS:                  RADIOLOGY & ADDITIONAL STUDIES:      IMPRESSION & PLAN:    47 with multiple strokes- MRI showed multiple left strokes and old right strokes  Vascular studies showed intracranial stensoses and occlusions  Suspicious for cardioembolic disease.  For TOLU and loop monitor today  Also need hemtaology eval for hyper coagulopathy  For not on ASA 325mg,  consideration for anticoagulation therapy  Disp- planning- Rehab

## 2017-04-03 NOTE — PROGRESS NOTE ADULT - PROBLEM SELECTOR PROBLEM 5
Gastroesophageal reflux disease without esophagitis
Iron deficiency anemia, unspecified iron deficiency anemia type
Prophylactic measure
Iron deficiency anemia, unspecified iron deficiency anemia type

## 2017-04-03 NOTE — PROGRESS NOTE ADULT - SUBJECTIVE AND OBJECTIVE BOX
ALEX MALDONADOCHILO-[id number]    Patient is a 47y old  Female who presents with a chief complaint of unable to speak properly x1 week (31 Mar 2017 03:05)    HPI:  46 y/o female with h/oDM II, HTN, was noticed by family that she is unable to express her self in clear words and she leans to the left whenever she walks for the last week. CT brain shows a recent left parietal infarct and an old right posterior parietal infarct. Pt c/o left side headache. no limb weakness in the ER. Patient has an expressive aphasia. o/e: systolic heart murmur. hgb: 9.1 (30 Mar 2017 05:24)    Today pt states that she is having a good day. Notes that she still had a left frontal headache which has been constant in nature since admission. Headache is alleviated with pain medication. Notes that her speech is improving. Ambulating without difficultly.    ROS:   +headache, no nausea, no vomiting, no weakness.     HEALTH ISSUES - PROBLEM Dx:  Iron deficiency anemia, unspecified iron deficiency anemia type: Iron deficiency anemia, unspecified iron deficiency anemia type  Occlusion of left carotid artery: Occlusion of left carotid artery  Uncontrolled other specified diabetes mellitus with complication, unspecified long term insulin use status: Uncontrolled other specified diabetes mellitus with complication, unspecified long term insulin use status  Uncontrolled diabetes mellitus: Uncontrolled diabetes mellitus  Prophylactic measure: Prophylactic measure  Hemianopsia: Hemianopsia  Aphasia complicating stroke: Aphasia complicating stroke  Cerebrovascular accident (CVA) due to other mechanism: Cerebrovascular accident (CVA) due to other mechanism  Anemia, unspecified type: Anemia, unspecified type  Gastroesophageal reflux disease without esophagitis: Gastroesophageal reflux disease without esophagitis  Heart murmur: Heart murmur  Essential hypertension: Essential hypertension  Type 2 diabetes mellitus with complication, without long-term current use of insulin: Type 2 diabetes mellitus with complication, without long-term current use of insulin  Cerebrovascular accident (CVA) due to embolism of precerebral artery: Cerebrovascular accident (CVA) due to embolism of precerebral artery    PAST MEDICAL & SURGICAL HISTORY:  Acid reflux  HTN (hypertension)  Diabetes  No pertinent past medical history  No significant past surgical history    MEDICATIONS  (STANDING):  sodium chloride 0.9% lock flush 3milliLiter(s) IV Push every 8 hours  pantoprazole    Tablet 40milliGRAM(s) Oral before breakfast  atorvastatin 20milliGRAM(s) Oral at bedtime  aspirin enteric coated 325milliGRAM(s) Oral daily  dextrose 5%. 1000milliLiter(s) IV Continuous <Continuous>  dextrose 50% Injectable 12.5Gram(s) IV Push once  dextrose 50% Injectable 25Gram(s) IV Push once  dextrose 50% Injectable 25Gram(s) IV Push once  insulin lispro (HumaLOG) corrective regimen sliding scale  SubCutaneous three times a day before meals  heparin  Injectable 5000Unit(s) SubCutaneous every 8 hours  insulin glargine Injectable (LANTUS) 15Unit(s) SubCutaneous at bedtime  ferrous    sulfate 325milliGRAM(s) Oral three times a day with meals    MEDICATIONS  (PRN):  dextrose Gel 1Dose(s) Oral once PRN Blood Glucose LESS THAN 70 milliGRAM(s)/deciliter  glucagon  Injectable 1milliGRAM(s) IntraMuscular once PRN Glucose LESS THAN 70 milligrams/deciliter  cyclobenzaprine 10milliGRAM(s) Oral three times a day PRN Muscle Spasm  acetaminophen   Tablet. 650milliGRAM(s) Oral every 6 hours PRN Mild Pain (1 - 3)  morphine  - Injectable 2milliGRAM(s) IV Push every 6 hours PRN Severe Pain (7 - 10)    LABS:    PT/INR - ( 03 Apr 2017 08:12 )   PT: 11.5 sec;   INR: 1.04 ratio                          9.0    7.1   )-----------( 239      ( 03 Apr 2017 08:12 )             31.9     03 Apr 2017 08:12    139    |  101    |  12.0   ----------------------------<  186    3.8     |  28.0   |  0.52     Ca    8.9        03 Apr 2017 08:12    PHYSICAL EXAM:  Vital Signs Last 24 Hrs  T(C): 36.8, Max: 36.8 (04-02 @ 16:00)  T(F): 98.3, Max: 98.3 (04-02 @ 16:00)  HR: 71 (65 - 79)  BP: 143/78 (115/54 - 143/78)  BP(mean): 96 (71 - 96)  RR: 12 (12 - 20)  SpO2: 98% (98% - 100%)    GENERAL NAD, ALERT LAYING IN BED.   HEENT NORMAL CEPHALIC ATRAUMATIC, EOMI  CVS: S1,S2,RRR  RESP: BL CTA, NO WHEEZING, NO RHONCHI, NO CRACKLES, NO LABORED BREATHING  ABD: SOFT, NON TENDER, NON DISTENDED, +BOWEL SOUNDS IN ALL 4 QUADRANTS  EXT: NO EDEMA  SKIN: WARM, DRY, INTACT  NEURO ALERT APPROPRIATE, FOLLOWS COMMANDS. SPEECH IS IMPROVED, MORE FLUENT TODAY BUT STILL SOUNDS PRESSURED   MUSCULOSKELETAL: ROM INTACT IN ALL 4 EXTREMITIES  PSYCH APPROPRIATE

## 2017-04-03 NOTE — PROGRESS NOTE ADULT - ASSESSMENT
Assessment and Plan:   47 F with PMH HTN, DM II and anemia presented with expressive aphasia, headache and unsteady gait admitted with CVA -multiple left hemispheric infarcts on CT (recent left parietal infarct and an old right posterior parietal infarct) and physical exam with persistent expressive aphasia.

## 2017-04-03 NOTE — PROGRESS NOTE ADULT - PROBLEM SELECTOR PLAN 4
Echo cardiogram
Essential hypertension.    bp initially was elevated but now wnl.   will obtain patients outpt bp medication regimen and restart if needed
Microcytic anemia concern for ALLI   -f/u anemia panel
Essential hypertension.    bp initially was elevated but now wnl.   will obtain patients outpt bp medication regimen and restart

## 2017-04-03 NOTE — PROGRESS NOTE ADULT - PROBLEM SELECTOR PROBLEM 1
Cerebrovascular accident (CVA) due to embolism of precerebral artery
Aphasia complicating stroke
Cerebrovascular accident (CVA) due to embolism of precerebral artery
Aphasia complicating stroke
Cerebrovascular accident (CVA) due to embolism of precerebral artery
Cerebrovascular accident (CVA) due to other mechanism
Cerebrovascular accident (CVA) due to other mechanism
Aphasia complicating stroke

## 2017-04-03 NOTE — PROGRESS NOTE ADULT - PROBLEM SELECTOR PROBLEM 3
Essential hypertension
Essential hypertension
Type 2 diabetes mellitus with complication, without long-term current use of insulin
Hemianopsia
Type 2 diabetes mellitus with complication, without long-term current use of insulin

## 2017-04-03 NOTE — PROGRESS NOTE ADULT - PROBLEM SELECTOR PLAN 5
- Anemia, iron deficiency  start feso4 325mg po tid.
DVT ppx with heparin sq
pantoprazole
- Anemia, iron deficiency  start feso4 325mg po tid.

## 2017-04-03 NOTE — PROGRESS NOTE ADULT - ATTENDING COMMENTS
acute rehab recommended for dc
Dispo: Continue current medical management and patient to be followed by PT/OT and ST

## 2017-04-03 NOTE — PROGRESS NOTE ADULT - PROBLEM SELECTOR PLAN 1
still has expressive aphasia and headache but improved from yesterday. cw speech therapy  MRI and MRA noted for left parietal temporal subdural acute/subacute infarct.   Smaller focus of acute/subacute infarct in the left occipital lobe.   TOLU planned for today. If negative, will need loop recorder prior to discharge to asses for pt at risk for afib due to cryptogenic stroke.  to be considered for anticoagulation depending on TOLU  c/w stroke protocol   c/w asa/statin/heparin sq   c/w PT and speech therapy/OT

## 2017-04-04 VITALS
RESPIRATION RATE: 15 BRPM | OXYGEN SATURATION: 99 % | HEART RATE: 74 BPM | DIASTOLIC BLOOD PRESSURE: 71 MMHG | SYSTOLIC BLOOD PRESSURE: 144 MMHG

## 2017-04-04 LAB
ANION GAP SERPL CALC-SCNC: 13 MMOL/L — SIGNIFICANT CHANGE UP (ref 5–17)
BUN SERPL-MCNC: 10 MG/DL — SIGNIFICANT CHANGE UP (ref 8–20)
CALCIUM SERPL-MCNC: 8.9 MG/DL — SIGNIFICANT CHANGE UP (ref 8.6–10.2)
CHLORIDE SERPL-SCNC: 99 MMOL/L — SIGNIFICANT CHANGE UP (ref 98–107)
CO2 SERPL-SCNC: 24 MMOL/L — SIGNIFICANT CHANGE UP (ref 22–29)
CREAT SERPL-MCNC: 0.52 MG/DL — SIGNIFICANT CHANGE UP (ref 0.5–1.3)
GLUCOSE SERPL-MCNC: 215 MG/DL — HIGH (ref 70–115)
HCT VFR BLD CALC: 31.1 % — LOW (ref 37–47)
HGB BLD-MCNC: 8.8 G/DL — LOW (ref 12–16)
MCHC RBC-ENTMCNC: 19.5 PG — LOW (ref 27–31)
MCHC RBC-ENTMCNC: 28.3 G/DL — LOW (ref 32–36)
MCV RBC AUTO: 69 FL — LOW (ref 81–99)
PLATELET # BLD AUTO: 252 K/UL — SIGNIFICANT CHANGE UP (ref 150–400)
POTASSIUM SERPL-MCNC: 3.8 MMOL/L — SIGNIFICANT CHANGE UP (ref 3.5–5.3)
POTASSIUM SERPL-SCNC: 3.8 MMOL/L — SIGNIFICANT CHANGE UP (ref 3.5–5.3)
RBC # BLD: 4.51 M/UL — SIGNIFICANT CHANGE UP (ref 4.4–5.2)
RBC # FLD: 20.1 % — HIGH (ref 11–15.6)
SODIUM SERPL-SCNC: 136 MMOL/L — SIGNIFICANT CHANGE UP (ref 135–145)
WBC # BLD: 6.8 K/UL — SIGNIFICANT CHANGE UP (ref 4.8–10.8)
WBC # FLD AUTO: 6.8 K/UL — SIGNIFICANT CHANGE UP (ref 4.8–10.8)

## 2017-04-04 PROCEDURE — 99239 HOSP IP/OBS DSCHRG MGMT >30: CPT

## 2017-04-04 RX ORDER — ENOXAPARIN SODIUM 100 MG/ML
15 INJECTION SUBCUTANEOUS
Qty: 450 | Refills: 0 | OUTPATIENT
Start: 2017-04-04 | End: 2017-05-04

## 2017-04-04 RX ORDER — ATORVASTATIN CALCIUM 80 MG/1
1 TABLET, FILM COATED ORAL
Qty: 30 | Refills: 0 | OUTPATIENT
Start: 2017-04-04 | End: 2017-05-04

## 2017-04-04 RX ORDER — ASPIRIN/CALCIUM CARB/MAGNESIUM 324 MG
1 TABLET ORAL
Qty: 30 | Refills: 0 | OUTPATIENT
Start: 2017-04-04 | End: 2017-05-04

## 2017-04-04 RX ORDER — FERROUS SULFATE 325(65) MG
1 TABLET ORAL
Qty: 90 | Refills: 0 | OUTPATIENT
Start: 2017-04-04 | End: 2017-05-04

## 2017-04-04 RX ADMIN — PANTOPRAZOLE SODIUM 40 MILLIGRAM(S): 20 TABLET, DELAYED RELEASE ORAL at 06:19

## 2017-04-04 RX ADMIN — MORPHINE SULFATE 2 MILLIGRAM(S): 50 CAPSULE, EXTENDED RELEASE ORAL at 00:35

## 2017-04-04 RX ADMIN — Medication: at 09:36

## 2017-04-04 RX ADMIN — MORPHINE SULFATE 2 MILLIGRAM(S): 50 CAPSULE, EXTENDED RELEASE ORAL at 06:28

## 2017-04-04 RX ADMIN — MORPHINE SULFATE 2 MILLIGRAM(S): 50 CAPSULE, EXTENDED RELEASE ORAL at 07:10

## 2017-04-04 RX ADMIN — Medication 325 MILLIGRAM(S): at 10:36

## 2017-04-04 RX ADMIN — MORPHINE SULFATE 2 MILLIGRAM(S): 50 CAPSULE, EXTENDED RELEASE ORAL at 00:50

## 2017-04-04 RX ADMIN — SODIUM CHLORIDE 3 MILLILITER(S): 9 INJECTION INTRAMUSCULAR; INTRAVENOUS; SUBCUTANEOUS at 06:11

## 2017-04-04 RX ADMIN — HEPARIN SODIUM 5000 UNIT(S): 5000 INJECTION INTRAVENOUS; SUBCUTANEOUS at 06:19

## 2017-04-04 NOTE — PROGRESS NOTE ADULT - SUBJECTIVE AND OBJECTIVE BOX
INTERVAL HISTORY:  patient is stable. No interval change      VITAL SIGNS:  Vital Signs Last 24 Hrs  T(C): 36.7, Max: 36.9 (04-03 @ 16:00)  T(F): 98.1, Max: 98.5 (04-03 @ 16:00)  HR: 70 (70 - 84)  BP: 118/67 (118/67 - 146/78)  BP(mean): 90 (90 - 98)  RR: 14 (14 - 16)  SpO2: 100% (99% - 100%)    PHYSICAL EXAMINATION:    Mentation:  awkae and cooperative   Language/Speech: more fluent speech  CN:  Visual Fields:  Motor: no gross weakness  Sensory:  DTR:  Babinski:      MEDS:  MEDICATIONS  (STANDING):  sodium chloride 0.9% lock flush 3milliLiter(s) IV Push every 8 hours  pantoprazole    Tablet 40milliGRAM(s) Oral before breakfast  atorvastatin 20milliGRAM(s) Oral at bedtime  aspirin enteric coated 325milliGRAM(s) Oral daily  dextrose 5%. 1000milliLiter(s) IV Continuous <Continuous>  dextrose 50% Injectable 12.5Gram(s) IV Push once  dextrose 50% Injectable 25Gram(s) IV Push once  dextrose 50% Injectable 25Gram(s) IV Push once  insulin lispro (HumaLOG) corrective regimen sliding scale  SubCutaneous three times a day before meals  heparin  Injectable 5000Unit(s) SubCutaneous every 8 hours  insulin glargine Injectable (LANTUS) 15Unit(s) SubCutaneous at bedtime  ferrous    sulfate 325milliGRAM(s) Oral three times a day with meals    MEDICATIONS  (PRN):  dextrose Gel 1Dose(s) Oral once PRN Blood Glucose LESS THAN 70 milliGRAM(s)/deciliter  glucagon  Injectable 1milliGRAM(s) IntraMuscular once PRN Glucose LESS THAN 70 milligrams/deciliter  cyclobenzaprine 10milliGRAM(s) Oral three times a day PRN Muscle Spasm  acetaminophen   Tablet. 650milliGRAM(s) Oral every 6 hours PRN Mild Pain (1 - 3)  morphine  - Injectable 2milliGRAM(s) IV Push every 6 hours PRN Severe Pain (7 - 10)      LABS:                          8.8    6.8   )-----------( 252      ( 04 Apr 2017 07:01 )             31.1     04 Apr 2017 07:01    136    |  99     |  10.0   ----------------------------<  215    3.8     |  24.0   |  0.52     Ca    8.9        04 Apr 2017 07:01            RADIOLOGY & ADDITIONAL STUDIES:    TOLU- neg for potential cardioembolic source    IMPRESSION & PLAN;47 with multiple strokes- MRI showed multiple left strokes and old right strokes  Vascular studies showed intracranial stensoses and occlusions   TOLU-neg. Needs loop monitor to exclude non-diagnosed arrthymia  Also need hemtaology eval for hyper coagulopathy  On ASA 325mg,  consideration for anticoagulation therapy  Disp- planning- Rehab  Otherwise neurologically cleared for discharge INTERVAL HISTORY:  patient is stable. No interval change      VITAL SIGNS:  Vital Signs Last 24 Hrs  T(C): 36.7, Max: 36.9 (04-03 @ 16:00)  T(F): 98.1, Max: 98.5 (04-03 @ 16:00)  HR: 70 (70 - 84)  BP: 118/67 (118/67 - 146/78)  BP(mean): 90 (90 - 98)  RR: 14 (14 - 16)  SpO2: 100% (99% - 100%)    PHYSICAL EXAMINATION:    Mentation:  awkae and cooperative   Language/Speech: more fluent speech  CN:  Visual Fields:  Motor: no gross weakness  Sensory:  DTR:  Babinski:      MEDS:  MEDICATIONS  (STANDING):  sodium chloride 0.9% lock flush 3milliLiter(s) IV Push every 8 hours  pantoprazole    Tablet 40milliGRAM(s) Oral before breakfast  atorvastatin 20milliGRAM(s) Oral at bedtime  aspirin enteric coated 325milliGRAM(s) Oral daily  dextrose 5%. 1000milliLiter(s) IV Continuous <Continuous>  dextrose 50% Injectable 12.5Gram(s) IV Push once  dextrose 50% Injectable 25Gram(s) IV Push once  dextrose 50% Injectable 25Gram(s) IV Push once  insulin lispro (HumaLOG) corrective regimen sliding scale  SubCutaneous three times a day before meals  heparin  Injectable 5000Unit(s) SubCutaneous every 8 hours  insulin glargine Injectable (LANTUS) 15Unit(s) SubCutaneous at bedtime  ferrous    sulfate 325milliGRAM(s) Oral three times a day with meals    MEDICATIONS  (PRN):  dextrose Gel 1Dose(s) Oral once PRN Blood Glucose LESS THAN 70 milliGRAM(s)/deciliter  glucagon  Injectable 1milliGRAM(s) IntraMuscular once PRN Glucose LESS THAN 70 milligrams/deciliter  cyclobenzaprine 10milliGRAM(s) Oral three times a day PRN Muscle Spasm  acetaminophen   Tablet. 650milliGRAM(s) Oral every 6 hours PRN Mild Pain (1 - 3)  morphine  - Injectable 2milliGRAM(s) IV Push every 6 hours PRN Severe Pain (7 - 10)      LABS:                          8.8    6.8   )-----------( 252      ( 04 Apr 2017 07:01 )             31.1     04 Apr 2017 07:01    136    |  99     |  10.0   ----------------------------<  215    3.8     |  24.0   |  0.52     Ca    8.9        04 Apr 2017 07:01            RADIOLOGY & ADDITIONAL STUDIES:    TOLU- neg for potential cardioembolic source    IMPRESSION & PLAN;47 with multiple strokes- MRI showed multiple left strokes and old right strokes  Vascular studies showed intracranial stensoses and occlusions   TOLU-neg. Needs loop monitor to exclude non-diagnosed arrthymia  Also need hemtaology eval for hyper coagulopathy- labs ordered  On ASA 325mg,  consideration for anticoagulation therapy  Disp- planning- Rehab  Otherwise neurologically cleared for discharge  Follow up after discharge-TCD-bubble?

## 2017-04-04 NOTE — OCCUPATIONAL THERAPY INITIAL EVALUATION ADULT - ADDITIONAL COMMENTS
patient reports living with "Alda" in an apartment with approximately 4 AMADOU and 1 HR; no steps within. Patient utilizes a step over tub to shower and has no AE.

## 2017-04-04 NOTE — PROGRESS NOTE ADULT - PROVIDER SPECIALTY LIST ADULT
Cardiology
Hospitalist
Hospitalist
Internal Medicine
Internal Medicine
Neurology
Rehab Medicine
Hospitalist

## 2017-05-26 ENCOUNTER — INPATIENT (INPATIENT)
Facility: HOSPITAL | Age: 47
LOS: 3 days | Discharge: HOME | DRG: 66 | End: 2017-05-30
Attending: FAMILY MEDICINE | Admitting: HOSPITALIST
Payer: COMMERCIAL

## 2017-05-26 VITALS
RESPIRATION RATE: 18 BRPM | SYSTOLIC BLOOD PRESSURE: 147 MMHG | HEART RATE: 71 BPM | OXYGEN SATURATION: 100 % | HEIGHT: 64 IN | TEMPERATURE: 99 F | WEIGHT: 205.91 LBS | DIASTOLIC BLOOD PRESSURE: 87 MMHG

## 2017-05-26 DIAGNOSIS — I63.9 CEREBRAL INFARCTION, UNSPECIFIED: ICD-10-CM

## 2017-05-26 DIAGNOSIS — K21.9 GASTRO-ESOPHAGEAL REFLUX DISEASE WITHOUT ESOPHAGITIS: ICD-10-CM

## 2017-05-26 DIAGNOSIS — Z00.00 ENCOUNTER FOR GENERAL ADULT MEDICAL EXAMINATION WITHOUT ABNORMAL FINDINGS: ICD-10-CM

## 2017-05-26 DIAGNOSIS — I10 ESSENTIAL (PRIMARY) HYPERTENSION: ICD-10-CM

## 2017-05-26 DIAGNOSIS — R47.01 APHASIA: ICD-10-CM

## 2017-05-26 DIAGNOSIS — E11.628 TYPE 2 DIABETES MELLITUS WITH OTHER SKIN COMPLICATIONS: ICD-10-CM

## 2017-05-26 LAB
ALBUMIN SERPL ELPH-MCNC: 4 G/DL — SIGNIFICANT CHANGE UP (ref 3.3–5.2)
ALP SERPL-CCNC: 64 U/L — SIGNIFICANT CHANGE UP (ref 40–120)
ALT FLD-CCNC: 13 U/L — SIGNIFICANT CHANGE UP
ANION GAP SERPL CALC-SCNC: 17 MMOL/L — SIGNIFICANT CHANGE UP (ref 5–17)
ANISOCYTOSIS BLD QL: SLIGHT — SIGNIFICANT CHANGE UP
APAP SERPL-MCNC: <7.5 UG/ML — LOW (ref 10–26)
APTT BLD: 29.7 SEC — SIGNIFICANT CHANGE UP (ref 27.5–37.4)
AST SERPL-CCNC: 14 U/L — SIGNIFICANT CHANGE UP
BASOPHILS # BLD AUTO: 0 K/UL — SIGNIFICANT CHANGE UP (ref 0–0.2)
BASOPHILS NFR BLD AUTO: 0.2 % — SIGNIFICANT CHANGE UP (ref 0–2)
BILIRUB SERPL-MCNC: 0.1 MG/DL — LOW (ref 0.4–2)
BUN SERPL-MCNC: 9 MG/DL — SIGNIFICANT CHANGE UP (ref 8–20)
BURR CELLS BLD QL SMEAR: SIGNIFICANT CHANGE UP
CALCIUM SERPL-MCNC: 8.9 MG/DL — SIGNIFICANT CHANGE UP (ref 8.6–10.2)
CHLORIDE SERPL-SCNC: 100 MMOL/L — SIGNIFICANT CHANGE UP (ref 98–107)
CO2 SERPL-SCNC: 24 MMOL/L — SIGNIFICANT CHANGE UP (ref 22–29)
CREAT SERPL-MCNC: 0.53 MG/DL — SIGNIFICANT CHANGE UP (ref 0.5–1.3)
DACRYOCYTES BLD QL SMEAR: SLIGHT — SIGNIFICANT CHANGE UP
ELLIPTOCYTES BLD QL SMEAR: SLIGHT — SIGNIFICANT CHANGE UP
EOSINOPHIL # BLD AUTO: 0.1 K/UL — SIGNIFICANT CHANGE UP (ref 0–0.5)
EOSINOPHIL NFR BLD AUTO: 2.3 % — SIGNIFICANT CHANGE UP (ref 0–6)
GLUCOSE SERPL-MCNC: 237 MG/DL — HIGH (ref 70–115)
HCG SERPL-ACNC: <2 MIU/ML — SIGNIFICANT CHANGE UP
HCT VFR BLD CALC: 35.6 % — LOW (ref 37–47)
HGB BLD-MCNC: 10.7 G/DL — LOW (ref 12–16)
HYPOCHROMIA BLD QL: SLIGHT — SIGNIFICANT CHANGE UP
INR BLD: 1.02 RATIO — SIGNIFICANT CHANGE UP (ref 0.88–1.16)
LYMPHOCYTES # BLD AUTO: 2.2 K/UL — SIGNIFICANT CHANGE UP (ref 1–4.8)
LYMPHOCYTES # BLD AUTO: 39.2 % — SIGNIFICANT CHANGE UP (ref 20–55)
MACROCYTES BLD QL: SLIGHT — SIGNIFICANT CHANGE UP
MAGNESIUM SERPL-MCNC: 1.8 MG/DL — SIGNIFICANT CHANGE UP (ref 1.6–2.6)
MCHC RBC-ENTMCNC: 24.5 PG — LOW (ref 27–31)
MCHC RBC-ENTMCNC: 30.1 G/DL — LOW (ref 32–36)
MCV RBC AUTO: 81.5 FL — SIGNIFICANT CHANGE UP (ref 81–99)
MICROCYTES BLD QL: SLIGHT — SIGNIFICANT CHANGE UP
MONOCYTES # BLD AUTO: 0.3 K/UL — SIGNIFICANT CHANGE UP (ref 0–0.8)
MONOCYTES NFR BLD AUTO: 6.1 % — SIGNIFICANT CHANGE UP (ref 3–10)
NEUTROPHILS # BLD AUTO: 2.9 K/UL — SIGNIFICANT CHANGE UP (ref 1.8–8)
NEUTROPHILS NFR BLD AUTO: 52 % — SIGNIFICANT CHANGE UP (ref 37–73)
OVALOCYTES BLD QL SMEAR: SLIGHT — SIGNIFICANT CHANGE UP
PHOSPHATE SERPL-MCNC: 3 MG/DL — SIGNIFICANT CHANGE UP (ref 2.4–4.7)
PLAT MORPH BLD: NORMAL — SIGNIFICANT CHANGE UP
PLATELET # BLD AUTO: 294 K/UL — SIGNIFICANT CHANGE UP (ref 150–400)
POIKILOCYTOSIS BLD QL AUTO: SLIGHT — SIGNIFICANT CHANGE UP
POTASSIUM SERPL-MCNC: 3.7 MMOL/L — SIGNIFICANT CHANGE UP (ref 3.5–5.3)
POTASSIUM SERPL-SCNC: 3.7 MMOL/L — SIGNIFICANT CHANGE UP (ref 3.5–5.3)
PROT SERPL-MCNC: 7.3 G/DL — SIGNIFICANT CHANGE UP (ref 6.6–8.7)
PROTHROM AB SERPL-ACNC: 11.2 SEC — SIGNIFICANT CHANGE UP (ref 9.8–12.7)
RBC # BLD: 4.37 M/UL — LOW (ref 4.4–5.2)
RBC # FLD: 20.9 % — HIGH (ref 11–15.6)
RBC BLD AUTO: ABNORMAL
SALICYLATES SERPL-MCNC: <2 MG/DL — LOW (ref 10–20)
SCHISTOCYTES BLD QL AUTO: SLIGHT — SIGNIFICANT CHANGE UP
SODIUM SERPL-SCNC: 141 MMOL/L — SIGNIFICANT CHANGE UP (ref 135–145)
TSH SERPL-MCNC: 0.76 UIU/ML — SIGNIFICANT CHANGE UP (ref 0.27–4.2)
WBC # BLD: 5.5 K/UL — SIGNIFICANT CHANGE UP (ref 4.8–10.8)
WBC # FLD AUTO: 5.5 K/UL — SIGNIFICANT CHANGE UP (ref 4.8–10.8)

## 2017-05-26 PROCEDURE — 99285 EMERGENCY DEPT VISIT HI MDM: CPT

## 2017-05-26 PROCEDURE — 99223 1ST HOSP IP/OBS HIGH 75: CPT | Mod: GC

## 2017-05-26 PROCEDURE — 93010 ELECTROCARDIOGRAM REPORT: CPT

## 2017-05-26 PROCEDURE — 71010: CPT | Mod: 26

## 2017-05-26 PROCEDURE — 70450 CT HEAD/BRAIN W/O DYE: CPT | Mod: 26

## 2017-05-26 RX ORDER — INSULIN LISPRO 100/ML
VIAL (ML) SUBCUTANEOUS
Qty: 0 | Refills: 0 | Status: DISCONTINUED | OUTPATIENT
Start: 2017-05-26 | End: 2017-05-30

## 2017-05-26 RX ORDER — ASPIRIN/CALCIUM CARB/MAGNESIUM 324 MG
325 TABLET ORAL ONCE
Qty: 0 | Refills: 0 | Status: COMPLETED | OUTPATIENT
Start: 2017-05-26 | End: 2017-05-26

## 2017-05-26 RX ORDER — GLUCAGON INJECTION, SOLUTION 0.5 MG/.1ML
1 INJECTION, SOLUTION SUBCUTANEOUS ONCE
Qty: 0 | Refills: 0 | Status: DISCONTINUED | OUTPATIENT
Start: 2017-05-26 | End: 2017-05-30

## 2017-05-26 RX ORDER — ENOXAPARIN SODIUM 100 MG/ML
40 INJECTION SUBCUTANEOUS DAILY
Qty: 0 | Refills: 0 | Status: DISCONTINUED | OUTPATIENT
Start: 2017-05-26 | End: 2017-05-26

## 2017-05-26 RX ORDER — PANTOPRAZOLE SODIUM 20 MG/1
40 TABLET, DELAYED RELEASE ORAL
Qty: 0 | Refills: 0 | Status: DISCONTINUED | OUTPATIENT
Start: 2017-05-26 | End: 2017-05-30

## 2017-05-26 RX ORDER — DEXTROSE 50 % IN WATER 50 %
25 SYRINGE (ML) INTRAVENOUS ONCE
Qty: 0 | Refills: 0 | Status: DISCONTINUED | OUTPATIENT
Start: 2017-05-26 | End: 2017-05-30

## 2017-05-26 RX ORDER — INSULIN GLARGINE 100 [IU]/ML
15 INJECTION, SOLUTION SUBCUTANEOUS AT BEDTIME
Qty: 0 | Refills: 0 | Status: DISCONTINUED | OUTPATIENT
Start: 2017-05-26 | End: 2017-05-30

## 2017-05-26 RX ORDER — ASPIRIN/CALCIUM CARB/MAGNESIUM 324 MG
325 TABLET ORAL DAILY
Qty: 0 | Refills: 0 | Status: DISCONTINUED | OUTPATIENT
Start: 2017-05-27 | End: 2017-05-27

## 2017-05-26 RX ORDER — DEXTROSE 50 % IN WATER 50 %
1 SYRINGE (ML) INTRAVENOUS ONCE
Qty: 0 | Refills: 0 | Status: DISCONTINUED | OUTPATIENT
Start: 2017-05-26 | End: 2017-05-30

## 2017-05-26 RX ORDER — FERROUS SULFATE 325(65) MG
325 TABLET ORAL
Qty: 0 | Refills: 0 | Status: DISCONTINUED | OUTPATIENT
Start: 2017-05-26 | End: 2017-05-27

## 2017-05-26 RX ORDER — DEXTROSE 50 % IN WATER 50 %
12.5 SYRINGE (ML) INTRAVENOUS ONCE
Qty: 0 | Refills: 0 | Status: DISCONTINUED | OUTPATIENT
Start: 2017-05-26 | End: 2017-05-30

## 2017-05-26 RX ORDER — ATORVASTATIN CALCIUM 80 MG/1
40 TABLET, FILM COATED ORAL AT BEDTIME
Qty: 0 | Refills: 0 | Status: DISCONTINUED | OUTPATIENT
Start: 2017-05-26 | End: 2017-05-30

## 2017-05-26 RX ORDER — HYDROMORPHONE HYDROCHLORIDE 2 MG/ML
1 INJECTION INTRAMUSCULAR; INTRAVENOUS; SUBCUTANEOUS ONCE
Qty: 0 | Refills: 0 | Status: DISCONTINUED | OUTPATIENT
Start: 2017-05-26 | End: 2017-05-26

## 2017-05-26 RX ADMIN — HYDROMORPHONE HYDROCHLORIDE 1 MILLIGRAM(S): 2 INJECTION INTRAMUSCULAR; INTRAVENOUS; SUBCUTANEOUS at 18:00

## 2017-05-26 RX ADMIN — HYDROMORPHONE HYDROCHLORIDE 1 MILLIGRAM(S): 2 INJECTION INTRAMUSCULAR; INTRAVENOUS; SUBCUTANEOUS at 18:15

## 2017-05-26 RX ADMIN — Medication 1 MILLIGRAM(S): at 22:17

## 2017-05-26 RX ADMIN — Medication 325 MILLIGRAM(S): at 22:54

## 2017-05-26 NOTE — H&P ADULT - PROBLEM SELECTOR PLAN 1
Age undetermined with dysphagia and facial droop not TPA candidate will do closely neuro montioring as advised by ED and Neuro recc;'s  NIH scale  ASA 325mg given in ED will continue daily  Lipitor 20mg q hs increased to 40mg qd and will get Fasting lipids in am  EKG/ECHO  Cartoids recently done will not order for now await neuro recc's  Neuro checks Q 4hours  PT/OT consult for am will give OOB w assistance orders for ambulation orders  Neuro on board for neuro consult Dr. Serjio wong Age undetermined with dysphagia and facial droop not TPA candidate will do closely neuro montioring as advised by ED and Neuro recc;'s  NIH scale 2--> language and did not answer date/time question approiately  ASA 325mg given in ED will continue daily  Lipitor 20mg q hs increased to 40mg qd and will get Fasting lipids in am  EKG/ECHO  Cartoids recently done will not order for now await neuro recc's  Neuro checks Q 4hours  PT/OT consult for am will give OOB w assistance orders for ambulation orders  Neuro on board for neuro consult Dr. Bassett group Age undetermined with dysphagia and facial droop not TPA candidate will do closely neuro monitoring as advised by ED and Neuro recc;'s  NIH scale 2--> language and did not answer date/time question appropriately  ASA 325mg given in ED will continue daily  Lipitor 20mg q hs increased to 40mg qd and will get Fasting lipids in am  EKG/ECHO  Cartoids recently done will not order for now await neuro recc's  Neuro checks Q 4hours  PT/OT consult for am will give OOB w assistance orders for ambulation orders  Neuro on board for neuro consult Dr. Serjio wong

## 2017-05-26 NOTE — ED ADULT NURSE NOTE - OBJECTIVE STATEMENT
pt alert c/o headache x 3 days blurred vision x 2 days and confusion. sister states pt has change in her speech patterns as well. pt moving all extremities but c/o sever headache which she rates 10/10 pt alert c/o headache x 3 days blurred vision x 2 days and confusion. sister states pt has change in her speech patterns as well. pt moving all extremities but c/o sever headache which she rates 10/10. pt unable to remember the date or the year and states her mind comes and goes. pt admits to using crack yesterday. and alcohaol consumption

## 2017-05-26 NOTE — H&P ADULT - NSHPPHYSICALEXAM_GEN_ALL_CORE
Physical Exam:  · Constitutional	detailed exam  · Constitutional Details	well-developed; well-nourished; distress due to pain  · Eyes	detailed exam  · Eyes Details	PERRL; EOMI  · ENMT	detailed exam  · ENMT Details	mouth  · Mouth	moist  · Neck	detailed exam  · Neck Details	supple; no JVD; no bruit or palbable lymph nods  · Back	detailed exam  · Back Details	normal shape  · Respiratory	detailed exam  · Respiratory Details	clear to auscultation bilaterally; no rales  · Cardiovascular	detailed exam  · Cardiovascular Details	regular rate and rhythm  · Cardiovascular Details	murmur  · Murmur Timing	systolic  · Character of Systolic Murmur	murmur loudness: III/VI; upper lsb  · Gastrointestinal	detailed exam  · GI Normal	soft; nontender; no masses palpable; bowel sounds normal  · Extremities	detailed exam  · Extremities Details	no clubbing; no cyanosis; no pedal edema  · Extremities Comments	no calf tenderness  · Neurological	detailed exam  · Neurological Details	alert and oriented x 3  · Mental Status	expressive aphasia  · Skin	detailed exam  · Skin Details	warm and dry Physical Exam:  · Constitutional	detailed exam  · Constitutional Details	well-developed; well-nourished; distress due to headache  · Eyes Details	PERRL; EOMI  · ENMT :Mouth	moist, no tongue lesions midline  · Neck 	supple; no JVD; no bruit or normal lymph nods  · Back Details	normal shape, no back pain  · Respiratory Details	clear to auscultation bilaterally; no rales  · Cardiovascular Details	regular rate and rhythm S1S2 , +systolic  · Character of Systolic Murmur	murmur loudness: II/III; upper lsb  · GI Normal	soft; nontender; no masses palpable; bowel sounds normal  · Extremities	detailed exam  · Extremities Details	no clubbing; no cyanosis; no pedal edema  · Extremities Comments	no calf tenderness  · Neurological	detailed exam  · Neurological Details	alert and oriented x 2 name and place but not to time  · Mental Status	expressive aphasia  · Skin Details	warm and dry Physical Exam:  · Constitutional	detailed exam  · Constitutional Details	well-developed; well-nourished; distress due to headache  · Eyes Details	PERRL; EOMI  · ENMT :Mouth	moist, no tongue lesions midline  · Neck 	supple; no JVD; no bruit or normal lymph nods  · Back Details	normal shape, no back pain  · Respiratory Details	clear to auscultation bilaterally; no rales  · Cardiovascular Details	regular rate and rhythm S1S2 .  · GI Normal	soft; nontender; no masses palpable; bowel sounds normal  · Extremities	detailed exam  · Extremities Details	no clubbing; no cyanosis; no pedal edema  · Extremities Comments	no calf tenderness  · Neurological	detailed exam  · Neurological Details	alert and oriented x 2 name and place but not to time  · Mental Status	expressive aphasia. motor 5/5 b/l. tongue midline. no facial droop. sensory intact b/l.  · Skin Details	warm and dry

## 2017-05-26 NOTE — ED PROVIDER NOTE - PHYSICAL EXAMINATION
neuro: CN II - XII intact, EOMI, PERRL, no papilledema, 5/5 muscle strength x 4 extremities, no sensory deficits, 2+ dtr globally, negative babinski, minimal  ataxic gait, + receptive and expressive aphasia

## 2017-05-26 NOTE — H&P ADULT - PROBLEM SELECTOR PROBLEM 4
Type 2 diabetes mellitus with other skin complication HTN (hypertension) Type 2 diabetes mellitus with hyperglycemia, with long-term current use of insulin

## 2017-05-26 NOTE — ED ADULT NURSE REASSESSMENT NOTE - NS ED NURSE REASSESS COMMENT FT1
Assumed pt care @ 1900 from GIGI Holt. Pt is A&Ox2 in NAD resp even and unlabored on room air. Pt unable to tell the date/year. NSR on cardiac monitor. Pt complains of a R[place], Dr. Joselito [place].  IVs clean dry and intact, flushes without difficulty.  Safety maintained, Bed locked in lowest position. Pt states she drinks alcohol everyday and hasn't in two days and she smoked crack yesterday. Pt appears to have some aphasia. Pt pending labs. Will continue to monitor.

## 2017-05-26 NOTE — ED ADULT TRIAGE NOTE - CHIEF COMPLAINT QUOTE
weakness and slurred speech  pts family states that she has a headache hx of stroke, slurred speech x 1 month vison changes yesterday

## 2017-05-26 NOTE — ED PROVIDER NOTE - MEDICAL DECISION MAKING DETAILS
worsening neuro deficits stable bp she can have q 4 hour neuro checks will need further iffrentiation into pts coninutesd worsening neuro status . she has no clear time of onset of these new symptoms thush she was not a tpa candiate

## 2017-05-26 NOTE — ED PROVIDER NOTE - CARE PLAN
Principal Discharge DX:	CVA (cerebral vascular accident)  Secondary Diagnosis:	Aphasia  Secondary Diagnosis:	Altered mental status

## 2017-05-26 NOTE — H&P ADULT - NSHPLABSRESULTS_GEN_ALL_CORE
hbg 10.7   5.5   )-----------( 294      ( 26 May 2017 17:03 )             35.6   05-26    141  |  100  |  9.0  ----------------------------<  237<H>  3.7   |  24.0  |  0.53    Ca    8.9      26 May 2017 17:03  Phos  3.0     05-26  Mg     1.8     05-26    TPro  7.3  /  Alb  4.0  /  TBili  0.1<L>  /  DBili  x   /  AST  14  /  ALT  13  /  AlkPhos  64  05-26  EXAM:  CT BRAIN                          PROCEDURE DATE:  05/26/2017        INTERPRETATION:  Head CT without contrast   COMPARISON: Brain CT 3/29/2017 and brain MRI 4/1/2017.  CLINICAL INFORMATION: Occluded left internal carotid artery with a left   cerebral infarct. Continued altered mental status. Assess for evolving   infarct or hemorrhagic conversion. TECHNIQUE: Contiguous axial 2.5 mm   slice thickness images of the head were obtained without the use of   intravenous contrast media.  FINDINGS:  There is an evolving left frontotemporal parietal and occipital lobe of   infarct, nonhemorrhagic, with cytotoxic edema causing effacement of all   cortical sulci. The left of frontal lobe, intact. Right cerebrum shows   chronic right occipital lobe infarct described on prior MRI study.    Ventricles remain normal size with no intraventricular hemorrhage. No   midline shift.  . Osseous calvarium and skull base intact.    IMPRESSION: Evolving left temporal parietal left occipital lobe   nonhemorrhagic infarct with cytotoxic edema causing effacement of   cortical sulci.   Chronic right occipital lobe infarct. No shift of midline structures.

## 2017-05-26 NOTE — H&P ADULT - HISTORY OF PRESENT ILLNESS
46 y/o female with pmhx DM II, HTN,GERD presents to the ED with complaints of weakness and changes in speech.  She states that she is unable to express her self in clear words for 2months but has noticed a worsening in speech last 2 days. Family member sister states she has noticed this change in her speech patterns as well.  She also admits to headache x 3 days that is located generalized around the head. Pain scale rates 10/10.  but associated with blurred vision x 2 days. She admits to some increased confusion from baseline. She knows her name but has at times trouble remembering the date or the year and states her mind comes and goes. + hx of drug use She admits to using crack yesterday. and alcohol as well.    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 patient has had a baseline of expressive aphasia. hxo/e: systolic heart murmur 48 y/o female with pmhx DM II, HTN,GERD presents to the ED with complaints of weakness and changes in speech.  She states that she is unable to express her self in clear words for 2months but has noticed a worsening in speech last 3 days. Family member sister states she has noticed this change in her speech patterns as well.  She also admits to headache x 3 days that is located generalized around the head. Pain scale rates 10/10.  but associated with light sensitivity but no changes in  vision or trouble with loud sounds. She admits to some increased confusion from baseline. She knows her name but has at times trouble remembering the date or the year and states her mind comes and goes. + hx of drug use She admits to using crack yesterday. and alcohol as well.    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 patient has had a baseline of expressive aphasia. hxo/e: systolic heart murmur 46 y/o female with pmhx DM II, HTN,GERD presents to the ED with complaints of weakness and changes in speech.  She states that she is unable to express her self in clear words for 2months but has noticed a worsening in speech last 3 days. Family member sister states she has noticed this change in her speech patterns as well.  She also admits to headache x 3 days that is located generalized around the head. Pain scale rates 10/10.  but associated with light sensitivity but no changes in  vision or trouble with loud sounds. She admits to some increased confusion from baseline. She knows her name but has at times trouble remembering the date or the year and states her mind comes and goes. + hx of drug use She admits to using crack yesterday. and alcohol as well.    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 patient has had a baseline of expressive aphasia.

## 2017-05-26 NOTE — H&P ADULT - ASSESSMENT
48 y/o female with pmhx DM II, HTN,GERD presents to the ED with complaints of weakness and changes in speech.  CT scan done with + findings of CVA but due to unknown timing not a TPA candidate  Admit for CVA to medicine resident service Dr. Saeed  Neuro checks Q 4hours  Neuro consult called on board 48 y/o female with pmhx DM II, HTN,GERD presents to the ED with complaints of weakness and changes in speech.  CT scan done with + findings of CVA but due to unknown timing not a TPA candidate  Admit for CVA to medicine resident service Dr. Saeed  Neuro checks Q 4hours  Neuro consult called on board called service states as  will see patient in morning.

## 2017-05-26 NOTE — H&P ADULT - PROBLEM SELECTOR PLAN 7
VC boots  Lovenox on hold until case to be discussed with neurologist VC boots  Lovenox on hold until case to be discussed with neurologist considering left temporal, parietal ,left occipital infarct with cytotoxic edema.

## 2017-05-26 NOTE — H&P ADULT - NSHPREVIEWOFSYSTEMS_GEN_ALL_CORE
Constitutional: No fever, fatigue or weight loss.  Skin: No rash.  Eyes: +blurry vision or no  eye pain.  ENT: No congestion, ear pain, or sore throat.  Endocrine: No thyroid problems.+ DM hx on insulin  Cardiovascular: No chest pain or palpation.  Respiratory: No cough, shortness of breath, congestion, or wheezing.  Gastrointestinal: No abdominal pain, nausea, vomiting, or diarrhea.  Genitourinary: No dysuria.  Musculoskeletal: No joint swelling.  Neurologic: +headache.+ dysphagia Constitutional: No fever, fatigue or weight loss.  Skin: No rash.  Eyes: +light sensitivity no changes in vision  or no  eye pain.  ENT: No congestion, ear pain, or sore throat.  Endocrine: No thyroid problems.+ DM hx on insulin  Cardiovascular: No chest pain or palpation.  Respiratory: No cough, shortness of breath, congestion, or wheezing.  Gastrointestinal: No abdominal pain, nausea, vomiting, or diarrhea.  Genitourinary: No dysuria.  Musculoskeletal: No joint swelling.  Neurologic: +headache.+ dysarthria no limb weakness or changes in motor strength

## 2017-05-26 NOTE — H&P ADULT - PROBLEM SELECTOR PROBLEM 5
Gastroesophageal reflux disease without esophagitis Type 2 diabetes mellitus with other skin complication HTN (hypertension)

## 2017-05-26 NOTE — ED PROVIDER NOTE - OBJECTIVE STATEMENT
48 y/o female with pmhx DM II, HTN,GERD presents to the ED with complaints of weakness and changes in speech.  She states that she is unable to express her self in clear words for 2months but has noticed a worsening in speech last 3 days. Family member sister states she has noticed this change in her speech patterns as well.  She also admits to headache x 3 days that is located generalized around the head. Pain scale rates 10/10.  but associated with light sensitivity but no changes in  vision or trouble with loud sounds. She admits to some increased confusion from baseline. She knows her name but has at times trouble remembering the date or the year and states her mind comes and goes

## 2017-05-26 NOTE — H&P ADULT - PROBLEM SELECTOR PLAN 2
aspiration precautions  dysphagia screen aspiration precautions  dysphagia screen done passed Seen on previous carotid duplex and neck MRA on last admission  Will consider reconsulting Vascular surgery team due to patient with evolving CVA on left temporal infarct.   Continue w aspirin for now will discuss with neurologist for ICA findings. Seen on previous carotid duplex and neck MRA on last admission  Will consider reconsulting Vascular surgery team due to patient with  new evolving CVA on left temporal infarct.   Continue w aspirin for now will discuss with neurologist for ICA findings.

## 2017-05-26 NOTE — H&P ADULT - PROBLEM SELECTOR PLAN 4
DM sliding scale  Lantus 15 units q hs home dose continued  A1C level in am  DM diet DASH diet  VS q 4 hours

## 2017-05-26 NOTE — H&P ADULT - PROBLEM SELECTOR PLAN 5
Protonix 40mg q daily  aspiration precautions DM sliding scale  Lantus 15 units q hs home dose continued  A1C level in am  DM diet Patient reports hx but not on meds  will montior vitals closely  DASH diet  VS q 4 hours

## 2017-05-26 NOTE — H&P ADULT - PMH
Acid reflux    Diabetes    HTN (hypertension) Acid reflux    Cerebrovascular accident (CVA), unspecified mechanism    Diabetes    HTN (hypertension)

## 2017-05-27 DIAGNOSIS — I65.22 OCCLUSION AND STENOSIS OF LEFT CAROTID ARTERY: ICD-10-CM

## 2017-05-27 DIAGNOSIS — E11.65 TYPE 2 DIABETES MELLITUS WITH HYPERGLYCEMIA: ICD-10-CM

## 2017-05-27 DIAGNOSIS — D64.9 ANEMIA, UNSPECIFIED: ICD-10-CM

## 2017-05-27 LAB
ANION GAP SERPL CALC-SCNC: 13 MMOL/L — SIGNIFICANT CHANGE UP (ref 5–17)
ANISOCYTOSIS BLD QL: SIGNIFICANT CHANGE UP
BASOPHILS # BLD AUTO: 0 K/UL — SIGNIFICANT CHANGE UP (ref 0–0.2)
BASOPHILS NFR BLD AUTO: 0.3 % — SIGNIFICANT CHANGE UP (ref 0–2)
BUN SERPL-MCNC: 11 MG/DL — SIGNIFICANT CHANGE UP (ref 8–20)
BURR CELLS BLD QL SMEAR: PRESENT — SIGNIFICANT CHANGE UP
CALCIUM SERPL-MCNC: 8.8 MG/DL — SIGNIFICANT CHANGE UP (ref 8.6–10.2)
CHLORIDE SERPL-SCNC: 98 MMOL/L — SIGNIFICANT CHANGE UP (ref 98–107)
CHOLEST SERPL-MCNC: 159 MG/DL — SIGNIFICANT CHANGE UP (ref 110–199)
CO2 SERPL-SCNC: 26 MMOL/L — SIGNIFICANT CHANGE UP (ref 22–29)
CREAT SERPL-MCNC: 0.57 MG/DL — SIGNIFICANT CHANGE UP (ref 0.5–1.3)
DACRYOCYTES BLD QL SMEAR: SLIGHT — SIGNIFICANT CHANGE UP
ELLIPTOCYTES BLD QL SMEAR: SLIGHT — SIGNIFICANT CHANGE UP
EOSINOPHIL # BLD AUTO: 0.2 K/UL — SIGNIFICANT CHANGE UP (ref 0–0.5)
EOSINOPHIL NFR BLD AUTO: 2.9 % — SIGNIFICANT CHANGE UP (ref 0–6)
GLUCOSE SERPL-MCNC: 254 MG/DL — HIGH (ref 70–115)
HCT VFR BLD CALC: 34.3 % — LOW (ref 37–47)
HDLC SERPL-MCNC: 21 MG/DL — LOW
HGB BLD-MCNC: 10.4 G/DL — LOW (ref 12–16)
HYPOCHROMIA BLD QL: SLIGHT — SIGNIFICANT CHANGE UP
LIPID PNL WITH DIRECT LDL SERPL: 77 MG/DL — SIGNIFICANT CHANGE UP
LYMPHOCYTES # BLD AUTO: 2.5 K/UL — SIGNIFICANT CHANGE UP (ref 1–4.8)
LYMPHOCYTES # BLD AUTO: 40.5 % — SIGNIFICANT CHANGE UP (ref 20–55)
MACROCYTES BLD QL: SLIGHT — SIGNIFICANT CHANGE UP
MCHC RBC-ENTMCNC: 24.4 PG — LOW (ref 27–31)
MCHC RBC-ENTMCNC: 30.3 G/DL — LOW (ref 32–36)
MCV RBC AUTO: 80.5 FL — LOW (ref 81–99)
MICROCYTES BLD QL: SLIGHT — SIGNIFICANT CHANGE UP
MONOCYTES # BLD AUTO: 0.4 K/UL — SIGNIFICANT CHANGE UP (ref 0–0.8)
MONOCYTES NFR BLD AUTO: 5.7 % — SIGNIFICANT CHANGE UP (ref 3–10)
NEUTROPHILS # BLD AUTO: 3.1 K/UL — SIGNIFICANT CHANGE UP (ref 1.8–8)
NEUTROPHILS NFR BLD AUTO: 50.4 % — SIGNIFICANT CHANGE UP (ref 37–73)
OVALOCYTES BLD QL SMEAR: SLIGHT — SIGNIFICANT CHANGE UP
PLAT MORPH BLD: NORMAL — SIGNIFICANT CHANGE UP
PLATELET # BLD AUTO: 293 K/UL — SIGNIFICANT CHANGE UP (ref 150–400)
POIKILOCYTOSIS BLD QL AUTO: SIGNIFICANT CHANGE UP
POTASSIUM SERPL-MCNC: 3.8 MMOL/L — SIGNIFICANT CHANGE UP (ref 3.5–5.3)
POTASSIUM SERPL-SCNC: 3.8 MMOL/L — SIGNIFICANT CHANGE UP (ref 3.5–5.3)
RBC # BLD: 4.26 M/UL — LOW (ref 4.4–5.2)
RBC # FLD: 20.8 % — HIGH (ref 11–15.6)
RBC BLD AUTO: ABNORMAL
SODIUM SERPL-SCNC: 137 MMOL/L — SIGNIFICANT CHANGE UP (ref 135–145)
TOTAL CHOLESTEROL/HDL RATIO MEASUREMENT: 8 RATIO — HIGH (ref 3.3–7.1)
TRIGL SERPL-MCNC: 305 MG/DL — HIGH (ref 10–200)
WBC # BLD: 6.2 K/UL — SIGNIFICANT CHANGE UP (ref 4.8–10.8)
WBC # FLD AUTO: 6.2 K/UL — SIGNIFICANT CHANGE UP (ref 4.8–10.8)

## 2017-05-27 PROCEDURE — 93306 TTE W/DOPPLER COMPLETE: CPT | Mod: 26

## 2017-05-27 PROCEDURE — 99233 SBSQ HOSP IP/OBS HIGH 50: CPT | Mod: GC

## 2017-05-27 RX ORDER — INSULIN LISPRO 100/ML
VIAL (ML) SUBCUTANEOUS AT BEDTIME
Qty: 0 | Refills: 0 | Status: DISCONTINUED | OUTPATIENT
Start: 2017-05-27 | End: 2017-05-30

## 2017-05-27 RX ORDER — ACETAMINOPHEN 500 MG
1000 TABLET ORAL ONCE
Qty: 0 | Refills: 0 | Status: COMPLETED | OUTPATIENT
Start: 2017-05-27 | End: 2017-05-27

## 2017-05-27 RX ORDER — INSULIN GLARGINE 100 [IU]/ML
15 INJECTION, SOLUTION SUBCUTANEOUS ONCE
Qty: 0 | Refills: 0 | Status: COMPLETED | OUTPATIENT
Start: 2017-05-27 | End: 2017-05-27

## 2017-05-27 RX ORDER — ASCORBIC ACID 60 MG
500 TABLET,CHEWABLE ORAL DAILY
Qty: 0 | Refills: 0 | Status: DISCONTINUED | OUTPATIENT
Start: 2017-05-27 | End: 2017-05-30

## 2017-05-27 RX ORDER — ASPIRIN AND DIPYRIDAMOLE 25; 200 MG/1; MG/1
1 CAPSULE, EXTENDED RELEASE ORAL
Qty: 0 | Refills: 0 | Status: DISCONTINUED | OUTPATIENT
Start: 2017-05-27 | End: 2017-05-30

## 2017-05-27 RX ORDER — ACETAMINOPHEN 500 MG
650 TABLET ORAL EVERY 6 HOURS
Qty: 0 | Refills: 0 | Status: DISCONTINUED | OUTPATIENT
Start: 2017-05-27 | End: 2017-05-30

## 2017-05-27 RX ORDER — FERROUS SULFATE 325(65) MG
325 TABLET ORAL DAILY
Qty: 0 | Refills: 0 | Status: DISCONTINUED | OUTPATIENT
Start: 2017-05-27 | End: 2017-05-30

## 2017-05-27 RX ADMIN — Medication 650 MILLIGRAM(S): at 01:32

## 2017-05-27 RX ADMIN — Medication 500 MILLIGRAM(S): at 22:35

## 2017-05-27 RX ADMIN — PANTOPRAZOLE SODIUM 40 MILLIGRAM(S): 20 TABLET, DELAYED RELEASE ORAL at 05:59

## 2017-05-27 RX ADMIN — Medication 650 MILLIGRAM(S): at 04:33

## 2017-05-27 RX ADMIN — INSULIN GLARGINE 15 UNIT(S): 100 INJECTION, SOLUTION SUBCUTANEOUS at 22:35

## 2017-05-27 RX ADMIN — Medication 400 MILLIGRAM(S): at 13:50

## 2017-05-27 RX ADMIN — Medication 650 MILLIGRAM(S): at 20:28

## 2017-05-27 RX ADMIN — Medication 1: at 18:12

## 2017-05-27 RX ADMIN — INSULIN GLARGINE 15 UNIT(S): 100 INJECTION, SOLUTION SUBCUTANEOUS at 01:32

## 2017-05-27 RX ADMIN — Medication 650 MILLIGRAM(S): at 08:05

## 2017-05-27 RX ADMIN — ATORVASTATIN CALCIUM 40 MILLIGRAM(S): 80 TABLET, FILM COATED ORAL at 22:35

## 2017-05-27 RX ADMIN — Medication 1000 MILLIGRAM(S): at 14:30

## 2017-05-27 RX ADMIN — Medication 325 MILLIGRAM(S): at 13:49

## 2017-05-27 RX ADMIN — Medication 650 MILLIGRAM(S): at 21:15

## 2017-05-27 RX ADMIN — ASPIRIN AND DIPYRIDAMOLE 1 CAPSULE(S): 25; 200 CAPSULE, EXTENDED RELEASE ORAL at 18:13

## 2017-05-27 RX ADMIN — Medication 2: at 13:50

## 2017-05-27 NOTE — ED ADULT NURSE REASSESSMENT NOTE - NS ED NURSE REASSESS COMMENT FT1
Late entry : Pt persistent with high blood pressure Dr. Adrian made aware As per MD keep B/P over 200 's , will continue to monitor

## 2017-05-27 NOTE — PROGRESS NOTE ADULT - SUBJECTIVE AND OBJECTIVE BOX
Patient is a 47y Female with PMH of    Admitted with chief complaint of Patient is a 47y old  Female who presents with a chief complaint of dysarthria and weakness (26 May 2017 23:04)    Patient seen and examined at bedside, No acute overnight events.  Slept well  Denies fever, chest pain, SOB, abdominal pain, diarrhea, constipation, calf pain.  No palpitation  Ambulator status: OOB w assitance  Family: sister notified of status  Patient  eating well, voiding and last BM 2 days ago    Cardiac monitor showed no acute events overnight    VS:   Vital Signs Last 24 Hrs  T(C): 36.6, Max: 37 (05-26 @ 15:31)  T(F): 97.8, Max: 98.6 (05-26 @ 15:31)  HR: 66 (66 - 76)  BP: 190/86 (138/78 - 190/94)  BP(mean): --  RR: 20 (18 - 22)  SpO2: 100% (97% - 100%)    Physical Exam:     Neck: supple; no JVD; no bruit or normal lymph nods  · Back Details	normal shape, no back pain  · Respiratory Details	clear to auscultation bilaterally; no rales  · Cardiovascular Details	regular rate and rhythm S1S2 .  · GI Normal	soft; nontender; no masses palpable; bowel sounds normal  · Extremities	detailed exam  · Extremities Details	no clubbing; no cyanosis; no pedal edema  · Extremities Comments	no calf tenderness  · Neurological	detailed exam  · Neurological Details	alert and oriented x 2 name and place but not to time  · Mental Status	expressive aphasia. motor 5/5 b/l. tongue midline. no facial droop. sensory intact b/l.  · Skin Details	warm and dry      Labs:                        10.4   6.2   )-----------( 293      ( 27 May 2017 07:29 )             34.3   05-26    141  |  100  |  9.0  ----------------------------<  237<H>  3.7   |  24.0  |  0.53    Ca    8.9      26 May 2017 17:03  Phos  3.0     05-26  Mg     1.8     05-26    TPro  7.3  /  Alb  4.0  /  TBili  0.1<L>  /  DBili  x   /  AST  14  /  ALT  13  /  AlkPhos  64  05-26        Radiology:  *Pull    Medications:  MEDICATIONS  (STANDING):  insulin glargine Injectable (LANTUS) 15Unit(s) SubCutaneous at bedtime  insulin lispro (HumaLOG) corrective regimen sliding scale  SubCutaneous three times a day before meals  dextrose 50% Injectable 12.5Gram(s) IV Push once  dextrose 50% Injectable 25Gram(s) IV Push once  atorvastatin 40milliGRAM(s) Oral at bedtime  aspirin 325milliGRAM(s) Oral daily  ferrous    sulfate 325milliGRAM(s) Oral three times a day with meals  pantoprazole    Tablet 40milliGRAM(s) Oral before breakfast  insulin lispro (HumaLOG) corrective regimen sliding scale  SubCutaneous at bedtime    MEDICATIONS  (PRN):  dextrose Gel 1Dose(s) Oral once PRN Blood Glucose LESS THAN 70 milliGRAM(s)/deciliter  glucagon  Injectable 1milliGRAM(s) IntraMuscular once PRN Glucose LESS THAN 70 milligrams/deciliter  acetaminophen   Tablet. 650milliGRAM(s) Oral every 6 hours PRN Mild Pain (1 - 3) Patient is a 47y Female with PMH of    Admitted with chief complaint of Patient is a 47y old  Female who presents with a chief complaint of dysarthria and weakness (26 May 2017 23:04)    Patient seen and examined at bedside, No acute overnight events.  Slept well  Denies fever, chest pain, SOB, abdominal pain, diarrhea, constipation, calf pain.  No palpitation  Ambulator status: OOB w assitance  Family: sister notified of status  Patient  eating well, voiding and last BM 2 days ago      Cardiac monitor showed no acute events overnight    VS:   Vital Signs Last 24 Hrs  T(C): 36.6, Max: 37 (05-26 @ 15:31)  T(F): 97.8, Max: 98.6 (05-26 @ 15:31)  HR: 66 (66 - 76)  BP: 190/86 (138/78 - 190/94)  BP(mean): --  RR: 20 (18 - 22)  SpO2: 100% (97% - 100%)    Physical Exam:     Neck: supple; no JVD; no bruit or normal lymph nods  · Back Details	normal shape, no back pain  · Respiratory Details	clear to auscultation bilaterally; no rales  · Cardiovascular Details	regular rate and rhythm S1S2 .  · GI Normal	soft; nontender; no masses palpable; bowel sounds normal  · Extremities	detailed exam  · Extremities Details	no clubbing; no cyanosis; no pedal edema  · Extremities Comments	no calf tenderness  · Neurological	detailed exam  · Neurological Details	alert and oriented x 2 name and place but not to time  · Mental Status	expressive aphasia. motor 5/5 b/l. tongue midline. no facial droop. sensory intact b/l.  · Skin Details	warm and dry      Labs:                        10.4   6.2   )-----------( 293      ( 27 May 2017 07:29 )             34.3   05-26    141  |  100  |  9.0  ----------------------------<  237<H>  3.7   |  24.0  |  0.53    Ca    8.9      26 May 2017 17:03  Phos  3.0     05-26  Mg     1.8     05-26    TPro  7.3  /  Alb  4.0  /  TBili  0.1<L>  /  DBili  x   /  AST  14  /  ALT  13  /  AlkPhos  64  05-26        Radiology:  *Pull    Medications:  MEDICATIONS  (STANDING):  insulin glargine Injectable (LANTUS) 15Unit(s) SubCutaneous at bedtime  insulin lispro (HumaLOG) corrective regimen sliding scale  SubCutaneous three times a day before meals  dextrose 50% Injectable 12.5Gram(s) IV Push once  dextrose 50% Injectable 25Gram(s) IV Push once  atorvastatin 40milliGRAM(s) Oral at bedtime  aspirin 325milliGRAM(s) Oral daily  ferrous    sulfate 325milliGRAM(s) Oral three times a day with meals  pantoprazole    Tablet 40milliGRAM(s) Oral before breakfast  insulin lispro (HumaLOG) corrective regimen sliding scale  SubCutaneous at bedtime    MEDICATIONS  (PRN):  dextrose Gel 1Dose(s) Oral once PRN Blood Glucose LESS THAN 70 milliGRAM(s)/deciliter  glucagon  Injectable 1milliGRAM(s) IntraMuscular once PRN Glucose LESS THAN 70 milligrams/deciliter  acetaminophen   Tablet. 650milliGRAM(s) Oral every 6 hours PRN Mild Pain (1 - 3)

## 2017-05-27 NOTE — CONSULT NOTE ADULT - PROBLEM SELECTOR PROBLEM 1
Cerebrovascular accident (CVA), unspecified mechanism
ICAO (internal carotid artery occlusion), left

## 2017-05-27 NOTE — PROGRESS NOTE ADULT - PROBLEM SELECTOR PLAN 5
Patient reports hx but not on meds  will montior vitals closely  DASH diet  VS q 4 hours Patient reports hx but not on meds. permissive htn for now. IV tylenol for headache   will montior vitals closely  DASH diet DM sliding scale- states she uses insulin, however, did not stay long enough on last admission to be taught for DM and use of insulin. she had left ama. will do teaching prior to d/c   Lantus 15 units q hs home dose continued  A1C level in am  DM diet

## 2017-05-27 NOTE — PROGRESS NOTE ADULT - PROBLEM SELECTOR PLAN 3
aspiration precautions  dysphagia screen done passed aspiration precautions,as above, has some chronicity  f/up with diet   dysphagia screen done passed likely 2/2 iron def component with AOCD   added vitamin c to ferrous sulfate and monitor hgb

## 2017-05-27 NOTE — PROGRESS NOTE ADULT - PROBLEM SELECTOR PLAN 7
VC boots  Lovenox on hold until case to be discussed with neurologist considering left temporal, parietal ,left occipital infarct with cytotoxic edema. Protonix 40mg q daily  aspiration precautions

## 2017-05-27 NOTE — CONSULT NOTE ADULT - PROBLEM SELECTOR RECOMMENDATION 9
await MRI/MRA results  likely no surgical intervention  discussed with attending  pt may need neurosurgery evaluation pending imaging await MRI/MRA results  likely no vascular surgery intervention for extracranial carotid disease - no embolic stroke  discussed with attending  Strongly recommend neurosurgery evaluation after imaging - only possible intervention would be with them.

## 2017-05-27 NOTE — PROGRESS NOTE ADULT - PROBLEM SELECTOR PLAN 4
DM sliding scale  Lantus 15 units q hs home dose continued  A1C level in am  DM diet DM sliding scale- states she uses insulin, however, did not stay long enough on last admission to be taught for DM and use of insulin. she had left ama. will do teaching prior to d/c   Lantus 15 units q hs home dose continued  A1C level in am  DM diet aspiration precautions,as above, has some chronicity  f/up with diet   dysphagia screen done passed

## 2017-05-27 NOTE — PROGRESS NOTE ADULT - ASSESSMENT
48 y/o female with pmhx DM II, HTN,GERD presents to the ED with complaints of weakness and changes in speech.  CT scan done with + findings of CVA but due to unknown timing not a TPA candidate  Neuro checks Q 4hours  Neuro consult called on board will see patient in morning. 48 y/o female with pmhx DM II, HTN,GERD, +cocaine and etoh use currently (last time day prior to admission with cocaine) presents to the ED with complaints of weakness and changes in speech.  CT scan done with + findings of CVA but due to unknown timing not a TPA candidate  Neuro checks Q 4hours  Neuro consult called on board will see patient in morning.

## 2017-05-27 NOTE — PROGRESS NOTE ADULT - PROBLEM SELECTOR PLAN 1
Age undetermined with dysphagia and facial droop not TPA candidate will do closely neuro monitoring as advised by ED and Neuro recc;'s  NIH scale 2--> language and did not answer date/time question appropriately  ASA 325mg given in ED will continue daily  Lipitor 20mg q hs increased to 40mg qd and will get Fasting lipids in am  EKG/ECHO  Cartoids recently done will not order for now await neuro recc's but MRI/MRA brain ordered and MRA neck noncontrast ordered   Neuro checks Q 4hours  PT/OT consult for am will give OOB w assistance orders for ambulation orders  Neuro on board for neuro consult Dr. Serjio wong Age undetermined with dysphagia and facial droop not TPA candidate will do closely neuro monitoring as advised by ED and Neuro recc;'s  NIH scale 2--> language and did not answer date/time question appropriately  seen by neuro - changed asa to aggrenox. Rec hypercoag b/w - will be sent (some sent already)  c/w lipitor (dose increased)  UPgrade to stroke unit x 24 hours given headache, edema and potential for stroke to convert or worsen as it is now evolving   Patient has occluded Left ICA, f/up repeat MRA head/neck and MRI brain   Neuro checks Q 1 h  PT/OT consult for am will give OOB w assistance orders for ambulation orders  SW for etoh/cocaine cessation  repeat echo ordered, TOLU noted from last adm, cardio called - needed a loop recorder on prior admission

## 2017-05-27 NOTE — CONSULT NOTE ADULT - ASSESSMENT
Patient with cva almost 7 weeks ago with mixed receptive and expressive aphasia, was thought to be due to occlusion of left ICA intracranial segemrent, TTE and TOLU failed to show cardioembolic source, now admitted with worsening aphaisa , still no lateralizing weakness, recommmend mri brain, will change asa to aggrenox for the time being , recommend hypercoaguable bw, recommend agressive control of risk factors, hemoglobin A1c should be less than 7, patient should stop cocaine and alcohol. try to bring LDL less than 70.

## 2017-05-27 NOTE — PROGRESS NOTE ADULT - PROBLEM SELECTOR PLAN 6
Protonix 40mg q daily  aspiration precautions Patient reports hx but not on meds. permissive htn for now. IV tylenol for headache   will montior vitals closely  DASH diet

## 2017-05-27 NOTE — PROGRESS NOTE ADULT - PROBLEM SELECTOR PLAN 2
Seen on previous carotid duplex and neck MRA on last admission  Will consider reconsulting Vascular surgery team due to patient with  new evolving CVA on left temporal infarct.   Continue w aspirin for now will discuss with neurologist for ICA findings. vascular f/up called    Not likely to be any f/up with w/up as this patient signed ama on last admission, ? compliance. Will await vascular rec

## 2017-05-27 NOTE — ED ADULT NURSE REASSESSMENT NOTE - NS ED NURSE REASSESS COMMENT FT1
Pt remains A&Ox2 at this time. Dr. Carney @ bedside assessing pt. [facility] 2. NSR on cardiac monitor. C/O a HA @ this time. MD to order medications. Will continue to monitor.

## 2017-05-27 NOTE — PROGRESS NOTE ADULT - PROBLEM SELECTOR PROBLEM 5
HTN (hypertension) Type 2 diabetes mellitus with hyperglycemia, with long-term current use of insulin

## 2017-05-28 ENCOUNTER — TRANSCRIPTION ENCOUNTER (OUTPATIENT)
Age: 47
End: 2017-05-28

## 2017-05-28 LAB
CRP SERPL-MCNC: 0.3 MG/DL — SIGNIFICANT CHANGE UP (ref 0–0.4)
ERYTHROCYTE [SEDIMENTATION RATE] IN BLOOD: 20 MM/HR — SIGNIFICANT CHANGE UP (ref 0–20)

## 2017-05-28 PROCEDURE — 99233 SBSQ HOSP IP/OBS HIGH 50: CPT | Mod: GC

## 2017-05-28 PROCEDURE — 99223 1ST HOSP IP/OBS HIGH 75: CPT | Mod: GC

## 2017-05-28 RX ADMIN — Medication 650 MILLIGRAM(S): at 07:00

## 2017-05-28 RX ADMIN — Medication 2: at 08:48

## 2017-05-28 RX ADMIN — Medication 325 MILLIGRAM(S): at 12:16

## 2017-05-28 RX ADMIN — INSULIN GLARGINE 15 UNIT(S): 100 INJECTION, SOLUTION SUBCUTANEOUS at 21:16

## 2017-05-28 RX ADMIN — Medication 650 MILLIGRAM(S): at 21:08

## 2017-05-28 RX ADMIN — ATORVASTATIN CALCIUM 40 MILLIGRAM(S): 80 TABLET, FILM COATED ORAL at 21:15

## 2017-05-28 RX ADMIN — Medication 650 MILLIGRAM(S): at 06:25

## 2017-05-28 RX ADMIN — ASPIRIN AND DIPYRIDAMOLE 1 CAPSULE(S): 25; 200 CAPSULE, EXTENDED RELEASE ORAL at 06:26

## 2017-05-28 RX ADMIN — Medication 1: at 12:16

## 2017-05-28 RX ADMIN — ASPIRIN AND DIPYRIDAMOLE 1 CAPSULE(S): 25; 200 CAPSULE, EXTENDED RELEASE ORAL at 17:16

## 2017-05-28 RX ADMIN — Medication 650 MILLIGRAM(S): at 19:50

## 2017-05-28 RX ADMIN — Medication 500 MILLIGRAM(S): at 12:16

## 2017-05-28 RX ADMIN — PANTOPRAZOLE SODIUM 40 MILLIGRAM(S): 20 TABLET, DELAYED RELEASE ORAL at 08:48

## 2017-05-28 NOTE — DISCHARGE NOTE ADULT - MEDICATION SUMMARY - MEDICATIONS TO TAKE
I will START or STAY ON the medications listed below when I get home from the hospital:    acetaminophen 325 mg oral tablet  -- 2 tab(s) by mouth every 6 hours, As needed, Mild Pain (1 - 3) -for mild pain  -- Indication: For Headache    Lantus Solostar Pen 100 units/mL subcutaneous solution  -- 15 unit(s) subcutaneous once a day (at bedtime)  -- Do not drink alcoholic beverages when taking this medication.  It is very important that you take or use this exactly as directed.  Do not skip doses or discontinue unless directed by your doctor.  Keep in refrigerator.  Do not freeze.    -- Indication: For Diabetes    Glucophage 1000 mg oral tablet  -- 1 tab(s) by mouth 2 times a day  -- Check with your doctor before becoming pregnant.  Do not drink alcoholic beverages when taking this medication.  It is very important that you take or use this exactly as directed.  Do not skip doses or discontinue unless directed by your doctor.  Obtain medical advice before taking any non-prescription drugs as some may affect the action of this medication.  Take with food or milk.    -- Indication: For Diabetes    atorvastatin 20 mg oral tablet  -- 1 tab(s) by mouth once a day (at bedtime)  -- Indication: For Hyperlipidemia    aspirin-dipyridamole 25 mg-200 mg oral capsule, extended release  -- 1 cap(s) by mouth 2 times a day  -- Indication: For Cerebral infarction    bacitracin 500 units/g topical ointment  -- 1 application on skin 2 times a day  -- Indication: For IV site    ferrous sulfate 325 mg (65 mg elemental iron) oral tablet  -- 1 tab(s) by mouth 3 times a day  -- Check with your doctor before becoming pregnant.  Do not chew, break, or crush.  May discolor urine or feces.    -- Indication: For Anemia    docusate sodium 100 mg oral capsule  -- 1 cap(s) by mouth 2 times a day  -- Indication: For Constipation    polyethylene glycol 3350 oral powder for reconstitution  -- 17 gram(s) by mouth once a day, As needed, Constipation -for constipation  -- Indication: For Constipation    senna oral tablet  -- 2 tab(s) by mouth once a day (at bedtime), As needed, Constipation -for constipation  -- Indication: For Constipation    pantoprazole 40 mg oral delayed release tablet  -- 1 tab(s) by mouth once a day (before a meal)  -- Indication: For Gastroesophageal reflux disease without esophagitis    ascorbic acid 500 mg oral tablet  -- 1 tab(s) by mouth 3 times a day  -- Indication: For Anemia

## 2017-05-28 NOTE — DISCHARGE NOTE ADULT - PRINCIPAL DIAGNOSIS
Cerebrovascular accident (CVA) due to stenosis of middle cerebral artery, unspecified blood vessel laterality

## 2017-05-28 NOTE — PROGRESS NOTE ADULT - PROBLEM SELECTOR PLAN 5
DM sliding scale- states she uses insulin, however, did not stay long enough on last admission to be taught for DM and use of insulin. she had left ama. will do teaching prior to d/c   Lantus 15 units q hs home dose continued  A1C level in am  DM diet a1c 10.4 ? compliance though states she is. c/w 211-179  Lantus 15 units q hs home dose continued   strict control of c/s to help with the medical management of her occluded Left ICA

## 2017-05-28 NOTE — PHYSICAL THERAPY INITIAL EVALUATION ADULT - CRITERIA FOR SKILLED THERAPEUTIC INTERVENTIONS
anticipated equipment needs at discharge/risk reduction/prevention/functional limitations in following categories/rehab potential/predicted duration of therapy intervention/therapy frequency/impairments found/anticipated discharge recommendation

## 2017-05-28 NOTE — DISCHARGE NOTE ADULT - PATIENT PORTAL LINK FT
“You can access the [facility] Patient Portal, offered by [facility], by registering with the following website: [email or web address]”

## 2017-05-28 NOTE — CONSULT NOTE ADULT - SUBJECTIVE AND OBJECTIVE BOX
Patient is a 47y old  Female who presents with a chief complaint of trouble with speech    HPI:  47F right hand dominant with PMHx Left MCA infarct April 2017 with associated aphasia, substance abuse, DM II, HTN, presented to [facility] 5/26 with worsening aphasia, weakness and HA. Pt reports she had some trouble speaking since her stroke in April, but that HA and word finding difficulties were worsening in the 3 days prior to admission. Stroke in April was thought to be secondary to stenosis of the left ICA, TTE and TOLU were negative for cardioembolic source. She was discharged home on ASA and was not interested in receiving rehab services. Head CT on this admission showed an evolving left MCA infarct and chronic right occipital infarct. ASA was discontinued and she was started on Aggrenox. Vascular surgery was consulted and no surgical intervention in recommended. MRI/MRA, hypercoaguable work up currently pending.       REVIEW OF SYSTEMS  Constitutional - No fever, No weight loss, No fatigue  HEENT - No changes in vision, +HA (left side greater than right)  Respiratory - + cough, no wheezing, no SOB  Cardiovascular - No chest pain, No palpitations  Gastrointestinal - No abdominal pain, No nausea, No vomiting, No diarrhea, No constipation  Genitourinary - No dysuria, No frequency, No hematuria, No incontinence  Neurological - +headaches, +memory loss, No loss of strength, No numbness, No tremors, No trouble swallowing  Skin - No itching, No rashes, No lesions   Musculoskeletal - No joint pain, No joint swelling, No muscle pain      PAST MEDICAL & SURGICAL HISTORY  Cerebrovascular accident (CVA), unspecified mechanism  Acid reflux  HTN (hypertension)  Diabetes 2    SOCIAL HISTORY  Smoking - Denied  EtOH - History of EtOH abuse  Drugs - Crack cocaine used prior to admission    FUNCTIONAL HISTORY  Lives with friend in apartment, +elevator, no steps inside apt  Independent with ambulation and ADLs - no assistive devices    CURRENT FUNCTIONAL STATUS  Pending evaluation by PT/OT      FAMILY HISTORY   No pertinent family history in first degree relatives      RECENT LABS/IMAGING  CBC Full  -  ( 27 May 2017 07:29 )  WBC Count : 6.2 K/uL  Hemoglobin : 10.4 g/dL  Hematocrit : 34.3 %  Platelet Count - Automated : 293 K/uL  Mean Cell Volume : 80.5 fl  Mean Cell Hemoglobin : 24.4 pg  Mean Cell Hemoglobin Concentration : 30.3 g/dL  Auto Neutrophil # : 3.1 K/uL  Auto Lymphocyte # : 2.5 K/uL  Auto Monocyte # : 0.4 K/uL  Auto Eosinophil # : 0.2 K/uL  Auto Basophil # : 0.0 K/uL  Auto Neutrophil % : 50.4 %  Auto Lymphocyte % : 40.5 %  Auto Monocyte % : 5.7 %  Auto Eosinophil % : 2.9 %  Auto Basophil % : 0.3 %    05-27    137  |  98  |  11.0  ----------------------------<  254<H>  3.8   |  26.0  |  0.57    Ca    8.8      27 May 2017 07:29  Phos  3.0     05-26  Mg     1.8     05-26    TPro  7.3  /  Alb  4.0  /  TBili  0.1<L>  /  DBili  x   /  AST  14  /  ALT  13  /  AlkPhos  64  05-26        VITALS  T(C): 36.7, Max: 37 (05-28 @ 07:00)  HR: 77 (63 - 77)  BP: 140/71 (140/71 - 193/93)  RR: 15 (12 - 22)  SpO2: 99% (98% - 100%)  Wt(kg): --    ALLERGIES  No Known Allergies      MEDICATIONS   insulin glargine Injectable (LANTUS) 15Unit(s) SubCutaneous at bedtime  insulin lispro (HumaLOG) corrective regimen sliding scale  SubCutaneous three times a day before meals  dextrose Gel 1Dose(s) Oral once PRN  dextrose 50% Injectable 12.5Gram(s) IV Push once  dextrose 50% Injectable 25Gram(s) IV Push once  glucagon  Injectable 1milliGRAM(s) IntraMuscular once PRN  atorvastatin 40milliGRAM(s) Oral at bedtime  pantoprazole    Tablet 40milliGRAM(s) Oral before breakfast  acetaminophen   Tablet. 650milliGRAM(s) Oral every 6 hours PRN  insulin lispro (HumaLOG) corrective regimen sliding scale  SubCutaneous at bedtime  ferrous    sulfate 325milliGRAM(s) Oral daily  dipyridamole 200 mG/aspirin 25 mG 1Capsule(s) Oral two times a day  ascorbic acid 500milliGRAM(s) Oral daily      ----------------------------------------------------------------------------------------  PHYSICAL EXAM  Constitutional - NAD, Comfortable  HEENT - NCAT, EOMI  Neck - Supple, No limited ROM  Chest - CTA bilaterally, No wheeze, No rhonchi, No crackles, +dry non productive cough  Cardiovascular - RRR, S1S2, No murmurs  Abdomen - BS+, Soft, NTND  Extremities - No C/C/E, No calf tenderness   Neurologic Exam -                    Cognitive - Awake, Alert, AAO to self, hospital (unable to state name of hospital) and situation, required assistance for month and year      Communication - Fluent, +word finding difficulties, able to name some basic objects, recalls 1/3 words without assist, 2/3 words with assist, follows all commands     Cranial Nerves - CN 2-12 intact, left pupil reactive, but sluggish compared to right     Motor - Limited secondary to patient participation                    LEFT    UE - ShAB 4/5, EF 4/5,  5/5                    RIGHT UE - ShAB 5/5, EF 5/5,  5/5                    LEFT    LE - HF 3/5, KE 3/5, DF 3/5, PF 3/5                    RIGHT LE - HF 3/5, KE 3/5, DF 3/5, PF 3/5        Sensory - Intact to LT     Reflexes - DTR Intact, No primitive reflexive     Coordination - FTN  questionably impaired bilaterally - poor patient participation     OculoVestibular - No saccades, No nystagmus    Psychiatric - Flat Affect  ----------------------------------------------------------------------------------------  ASSESSMENT/PLAN    47F PMHx recent CVA with aphasia, now presenting with worsening aphasia and HA. MRI/MRA currently pending.     CVA PPX - Continue Aggrenox and Lipitor  DVT PPX - Aggrenox  Rehab Dispo - Recommend PT/OT and speech eval. Will follow-up after therapy evaluation. Pt declined therapy after stroke in April.
HPI:  46 y/o female with pmhx DM II, HTN,GERD, cva in april 2017,  presents to the ED with complaints of weakness and changes in speech.  Patient was having specch problems since Last CVA in April 2017.  has noticed a worsening in speech last 3 days. Family member sister states she has noticed this change in her speech patterns as well.  She also admits to headache x 3 days that is located generalized around the head. but associated with light sensitivity but no changes in  vision or trouble with loud sounds.Per admision note there  + hx of drug use She admits to using crack yesterday. and alcohol as well.    PAST MEDICAL & SURGICAL HISTORY:  Cerebrovascular accident (CVA), unspecified mechanism  Acid reflux  HTN (hypertension)  Diabetes  No pertinent past medical history  No significant past surgical history      REVIEW OF SYSTEMS:    As mentioned in HPI    MEDICATIONS  (STANDING):  insulin glargine Injectable (LANTUS) 15Unit(s) SubCutaneous at bedtime  insulin lispro (HumaLOG) corrective regimen sliding scale  SubCutaneous three times a day before meals  dextrose 50% Injectable 12.5Gram(s) IV Push once  dextrose 50% Injectable 25Gram(s) IV Push once  atorvastatin 40milliGRAM(s) Oral at bedtime  aspirin 325milliGRAM(s) Oral daily  pantoprazole    Tablet 40milliGRAM(s) Oral before breakfast  insulin lispro (HumaLOG) corrective regimen sliding scale  SubCutaneous at bedtime  ferrous    sulfate 325milliGRAM(s) Oral daily    MEDICATIONS  (PRN):  dextrose Gel 1Dose(s) Oral once PRN Blood Glucose LESS THAN 70 milliGRAM(s)/deciliter  glucagon  Injectable 1milliGRAM(s) IntraMuscular once PRN Glucose LESS THAN 70 milligrams/deciliter  acetaminophen   Tablet. 650milliGRAM(s) Oral every 6 hours PRN Mild Pain (1 - 3)      Allergies    No Known Allergies    Intolerances        SOCIAL HISTORY:    FAMILY HISTORY:  No pertinent family history in first degree relatives      PHYSICAL EXAM:  Vital Signs Last 24 Hrs  T(F): 97.8  HR: 66  BP: 190/86  RR: 20  Wt(kg): --    GENERAL: NAD, well-groomed, well-developed  HEAD:  Atraumatic, Normocephalic  EYES: EOMI, PERRLA, conjunctiva and sclera clear, right visual deficit possible hemianopsia  NECK: Supple,   NERVOUS SYSTEM:  Alert & Oriented to self only, naming objects 1/3, speech fluent.  cranial nerves II-XII normal except right hemianopsia,   Good concentration; Motor Strength 5/5 B/L upper and lower extremities; DTRs dimished 0 in UE and LE,  plantar responses flexor not reliable as patient shows strong withdraw.  sensory exam not done due to aphasia.   HEART: Regular rate and rhythm; No murmurs, rubs, or gallops          LABS:                        10.4   6.2   )-----------( 293      ( 27 May 2017 07:29 )             34.3     05-27    137  |  98  |  11.0  ----------------------------<  254<H>  3.8   |  26.0  |  0.57    Ca    8.8      27 May 2017 07:29  Phos  3.0     05-26  Mg     1.8     05-26    TPro  7.3  /  Alb  4.0  /  TBili  0.1<L>  /  DBili  x   /  AST  14  /  ALT  13  /  AlkPhos  64  05-26    PT/INR - ( 26 May 2017 19:50 )   PT: 11.2 sec;   INR: 1.02 ratio         PTT - ( 26 May 2017 19:50 )  PTT:29.7 sec      RADIOLOGY & ADDITIONAL STUDIES:  ct brain-cva with cytotoxic edema
INTERVAL HPI/OVERNIGHT EVENTS:    Pt admitted with aphasia x 3 days prior, history of left CVA in MCA, previously with stenosis of left ICA.  Pt denies drug use prior to admission.  No complaints at this time.    MEDICATIONS  (STANDING):  insulin glargine Injectable (LANTUS) 15Unit(s) SubCutaneous at bedtime  insulin lispro (HumaLOG) corrective regimen sliding scale  SubCutaneous three times a day before meals  dextrose 50% Injectable 12.5Gram(s) IV Push once  dextrose 50% Injectable 25Gram(s) IV Push once  atorvastatin 40milliGRAM(s) Oral at bedtime  pantoprazole    Tablet 40milliGRAM(s) Oral before breakfast  insulin lispro (HumaLOG) corrective regimen sliding scale  SubCutaneous at bedtime  ferrous    sulfate 325milliGRAM(s) Oral daily  dipyridamole 200 mG/aspirin 25 mG 1Capsule(s) Oral two times a day  ascorbic acid 500milliGRAM(s) Oral daily    MEDICATIONS  (PRN):  dextrose Gel 1Dose(s) Oral once PRN Blood Glucose LESS THAN 70 milliGRAM(s)/deciliter  glucagon  Injectable 1milliGRAM(s) IntraMuscular once PRN Glucose LESS THAN 70 milligrams/deciliter  acetaminophen   Tablet. 650milliGRAM(s) Oral every 6 hours PRN Mild Pain (1 - 3)      Vital Signs Last 24 Hrs  T(C): 36.7, Max: 36.9 (05-27 @ 04:31)  T(F): 98, Max: 98.4 (05-27 @ 04:31)  HR: 68 (66 - 76)  BP: 173/73 (138/78 - 193/93)  BP(mean): --  RR: 18 (18 - 22)  SpO2: 98% (97% - 100%)    PE  Gen: Asleep, arousable, slight expressive aphasia  Pulm: CTABL  CV: irregular, HR < 100  Abd: s, nt,nd  Ext: 5/5 motor and sensory  Neuro:  CN 2-12 grossly intact      I&O's Detail      LABS:                        10.4   6.2   )-----------( 293      ( 27 May 2017 07:29 )             34.3     05-27    137  |  98  |  11.0  ----------------------------<  254<H>  3.8   |  26.0  |  0.57    Ca    8.8      27 May 2017 07:29  Phos  3.0     05-26  Mg     1.8     05-26    TPro  7.3  /  Alb  4.0  /  TBili  0.1<L>  /  DBili  x   /  AST  14  /  ALT  13  /  AlkPhos  64  05-26    PT/INR - ( 26 May 2017 19:50 )   PT: 11.2 sec;   INR: 1.02 ratio         PTT - ( 26 May 2017 19:50 )  PTT:29.7 sec      RADIOLOGY & ADDITIONAL STUDIES:

## 2017-05-28 NOTE — DISCHARGE NOTE ADULT - NS AS DC STROKE ED MATERIALS
Need for Followup After Discharge/Prescribed Medications/Stroke Education Booklet/Stroke Warning Signs and Symptoms/Call 911 for Stroke/Risk Factors for Stroke

## 2017-05-28 NOTE — PHYSICAL THERAPY INITIAL EVALUATION ADULT - ADDITIONAL COMMENTS
Pt. reports she lives in a house with a flight of stairs, but pt. unable to report further details about stairs and equipment. Pt. with word finding difficulty and becoming increasingly agitated with questions.

## 2017-05-28 NOTE — DIETITIAN INITIAL EVALUATION ADULT. - OTHER INFO
Pt currently with good po intake, tolerating diet well. 100% breakfast completed this am. HgbA1c 10.4% - pt noncompliant.

## 2017-05-28 NOTE — PROGRESS NOTE ADULT - ASSESSMENT
48 y/o female with pmhx DM II, HTN,GERD, +cocaine and etoh use currently (last time day prior to admission with cocaine) presents to the ED with complaints of weakness and changes in speech.  CT scan done with + findings of CVA but due to unknown timing not a TPA candidate  Neuro checks Q 4hours  Neuro consult called on board will see patient in morning. 46 y/o female with pmhx DM II, HTN,GERD, +cocaine and etoh use currently (last time day prior to admission with cocaine) presents to the ED with complaints of weakness and changes in speech. CT scan done with + findings of CVA but due to unknown timing not a TPA candidate. Patient was here in April and found to have a stroke at that time, as well as an occluded left ICA. Now with worsening sx and noted evolving left tempo-parietal left occipital lobe cva with edema and effacement of cortical sulci. No intervention at that time, patient had declined and signed ama

## 2017-05-28 NOTE — PROGRESS NOTE ADULT - PROBLEM SELECTOR PLAN 4
aspiration precautions,as above, has some chronicity  f/up with diet   dysphagia screen done passed aspiration precautions,as above, has some chronicity  f/up with diet   dysphagia screen done passed - tolerating diet now

## 2017-05-28 NOTE — DISCHARGE NOTE ADULT - OTHER SIGNIFICANT FINDINGS
Labs, Radiology, Cardiology, and Other Results: hbg 10.7  5.5   )-----------( 294      ( 26 May 2017 17:03 )            35.6  05-26  141  |  100  |  9.0 ----------------------------<  237<H> 3.7   |  24.0  |  0.53  Ca    8.9      26 May 2017 17:03 Phos  3.0     05-26 Mg     1.8     05-26  TPro  7.3  /  Alb  4.0  /  TBili  0.1<L>  /  DBili  x   /  AST  14  /  ALT  13  /  AlkPhos  64  05-26 EXAM:  CT BRAIN: 05/26/2017    INTERPRETATION:  Head CT without contrast  COMPARISON: Brain CT 3/29/2017 and brain MRI 4/1/2017. CLINICAL INFORMATION: Occluded left internal carotid artery with a left  cerebral infarct. Continued altered mental status. Assess for evolving  infarct or hemorrhagic conversion. TECHNIQUE: Contiguous axial 2.5 mm  slice thickness images of the head were obtained without the use of  intravenous contrast media. FINDINGS: There is an evolving left frontotemporal parietal and occipital lobe of  infarct, nonhemorrhagic, with cytotoxic edema causing effacement of all  cortical sulci. The left of frontal lobe, intact. Right cerebrum shows  chronic right occipital lobe infarct described on prior MRI study.  Ventricles remain normal size with no intraventricular hemorrhage. No  midline shift. . Osseous calvarium and skull base intact.  IMPRESSION: Evolving left temporal parietal left occipital lobe  nonhemorrhagic infarct with cytotoxic edema causing effacement of  cortical sulci.  Chronic right occipital lobe infarct. No shift of midline structures.

## 2017-05-28 NOTE — DISCHARGE NOTE ADULT - NSTOBACCOSMKCESSPRO_GEN_A_NCS
Referred to [facility] for Tobacco Control.../[facility]...

## 2017-05-28 NOTE — PROGRESS NOTE ADULT - SUBJECTIVE AND OBJECTIVE BOX
Patient is a 47y old  Female who presents with a chief complaint of dysphagia and weakness (26 May 2017 23:04)      INTERVAL HPI/OVERNIGHT EVENTS:  47y  Female seen at bedside. Pt complains of H/A in L frontal lobe region, constant, 8/10 and is photo sensitive. Denies N/V, weakness, numbness.    ANTIMICROBIAL:    CARDIOVASCULAR:    PULMONARY:    NEUROLOGIC:  acetaminophen   Tablet. 650milliGRAM(s) Oral every 6 hours PRN    ONCOLOGIC:    HEMATOLOGIC:  dipyridamole 200 mG/aspirin 25 mG 1Capsule(s) Oral two times a day    GATROINTESTINAL:  pantoprazole    Tablet 40milliGRAM(s) Oral before breakfast     MEDS:    ENDO/METABOLIC:  insulin glargine Injectable (LANTUS) 15Unit(s) SubCutaneous at bedtime  insulin lispro (HumaLOG) corrective regimen sliding scale  SubCutaneous three times a day before meals  dextrose Gel 1Dose(s) Oral once PRN  dextrose 50% Injectable 12.5Gram(s) IV Push once  dextrose 50% Injectable 25Gram(s) IV Push once  glucagon  Injectable 1milliGRAM(s) IntraMuscular once PRN  atorvastatin 40milliGRAM(s) Oral at bedtime  insulin lispro (HumaLOG) corrective regimen sliding scale  SubCutaneous at bedtime    IV FLUID/NUTRITION:  ferrous    sulfate 325milliGRAM(s) Oral daily  ascorbic acid 500milliGRAM(s) Oral daily    TOPICAL:    IMMUNOLOGIC & OTHER      Allergies    No Known Allergies    Intolerances        Vital Signs Last 24 Hrs  T(C): 37, Max: 37 (05-28 @ 07:00)  T(F): 98.6, Max: 98.6 (05-28 @ 07:00)  HR: 72 (63 - 72)  BP: 159/80 (155/79 - 193/93)  BP(mean): 115 (110 - 115)  RR: 12 (12 - 22)  SpO2: 99% (98% - 100%)      Daily     Daily   I&O's Detail    I & Os for current day (as of 28 May 2017 08:03)  =============================================  IN:    Total IN: 0 ml  ---------------------------------------------  OUT:    Voided: 200 ml    Total OUT: 200 ml  ---------------------------------------------  Total NET: -200 ml        REVIEW OF SYSTEMS:    CONSTITUTIONAL: No fever, weight loss, or fatigue  EYES: No eye pain, visual disturbances, or discharge  ENMT:  No difficulty hearing, tinnitus, vertigo; No sinus or throat pain  NECK: No pain or stiffness  RESPIRATORY: No cough, wheezing, chills or hemoptysis; No shortness of breath  CARDIOVASCULAR: No chest pain, palpitations, dizziness, or leg swelling  GASTROINTESTINAL: No abdominal or epigastric pain. No nausea, vomiting, or hematemesis; No diarrhea or constipation.   GENITOURINARY: No dysuria, frequency, hematuria, or incontinence  NEUROLOGICAL: Headache. No memory loss, loss of strength, numbness, or tremors  SKIN: No itching, burning, rashes, or lesions   LYMPH NODES: No enlarged glands  ENDOCRINE: No heat or cold intolerance; No hair loss  MUSCULOSKELETAL: No joint pain or swelling; No muscle, back, or extremity pain  PSYCHIATRIC: No depression, anxiety, mood swings, or difficulty sleeping  HEME/LYMPH: No easy bruising, or bleeding gums  ALLERY AND IMMUNOLOGIC: No hives or eczema      PHYSICAL EXAM:    GENERAL: NAD, well-groomed, well-developed  HEAD:  Atraumatic, Normocephalic, no facial drooping appreciated  EYES: EOMI, PERRLA, conjunctiva and sclera clear  ENMT: No tonsillar erythema, exudates, or enlargement; Moist mucous membranes, Good dentition, No lesions  NECK: Supple, No JVD, Normal thyroid  NERVOUS SYSTEM:  Alert & Oriented X3, Good concentration; CN 2-12 intact. Motor Strength 5/5 B/L upper and lower extremities; DTRs 2+ intact and symmetric  CHEST/LUNG: Clear to auscultation bilaterally; No rales, rhonchi, wheezing, or rubs  HEART: Regular rate; No murmurs, rubs, or gallops  ABDOMEN: Soft, Nontender, Nondistended; Bowel sounds present  EXTREMITIES:  B/L Pitting edema, 2+ Peripheral Pulses, No clubbing, cyanosis  SKIN: No rashes or lesions      LABS:                        10.4   6.2   )-----------( 293      ( 27 May 2017 07:29 )             34.3     CBC Full  -  ( 27 May 2017 07:29 )  WBC Count : 6.2 K/uL  Hemoglobin : 10.4 g/dL  Hematocrit : 34.3 %  Platelet Count - Automated : 293 K/uL  Mean Cell Volume : 80.5 fl  Mean Cell Hemoglobin : 24.4 pg  Mean Cell Hemoglobin Concentration : 30.3 g/dL  Auto Neutrophil # : 3.1 K/uL  Auto Lymphocyte # : 2.5 K/uL  Auto Monocyte # : 0.4 K/uL  Auto Eosinophil # : 0.2 K/uL  Auto Basophil # : 0.0 K/uL  Auto Neutrophil % : 50.4 %  Auto Lymphocyte % : 40.5 %  Auto Monocyte % : 5.7 %  Auto Eosinophil % : 2.9 %  Auto Basophil % : 0.3 %    05-27    137  |  98  |  11.0  ----------------------------<  254<H>  3.8   |  26.0  |  0.57    Ca    8.8      27 May 2017 07:29  Phos  3.0     05-26  Mg     1.8     05-26    TPro  7.3  /  Alb  4.0  /  TBili  0.1<L>  /  DBili  x   /  AST  14  /  ALT  13  /  AlkPhos  64  05-26    PT/INR - ( 26 May 2017 19:50 )   PT: 11.2 sec;   INR: 1.02 ratio    PTT - ( 26 May 2017 19:50 )  PTT:29.7 sec    Hemoglobin A1C, Whole Blood: 10.4 % (03-30 @ 11:37)            LIVER FUNCTIONS - ( 26 May 2017 17:03 )  Alb: 4.0 g/dL / Pro: 7.3 g/dL / ALK PHOS: 64 U/L / ALT: 13 U/L / AST: 14 U/L / GGT: x             RADIOLOGY & ADDITIONAL TESTS: Patient is a 47y old  Female who presents with a chief complaint of dysphagia and weakness (26 May 2017 23:04)      INTERVAL HPI/OVERNIGHT EVENTS:  47y  Female seen at bedside. Pt complains of H/A in L frontal lobe region, constant, 8/10 and is photo sensitive. A&O x 1 only to person, Denies N/V, weakness, numbness.    ANTIMICROBIAL:    CARDIOVASCULAR:    PULMONARY:    NEUROLOGIC:  acetaminophen   Tablet. 650milliGRAM(s) Oral every 6 hours PRN    ONCOLOGIC:    HEMATOLOGIC:  dipyridamole 200 mG/aspirin 25 mG 1Capsule(s) Oral two times a day    GATROINTESTINAL:  pantoprazole    Tablet 40milliGRAM(s) Oral before breakfast     MEDS:    ENDO/METABOLIC:  insulin glargine Injectable (LANTUS) 15Unit(s) SubCutaneous at bedtime  insulin lispro (HumaLOG) corrective regimen sliding scale  SubCutaneous three times a day before meals  dextrose Gel 1Dose(s) Oral once PRN  dextrose 50% Injectable 12.5Gram(s) IV Push once  dextrose 50% Injectable 25Gram(s) IV Push once  glucagon  Injectable 1milliGRAM(s) IntraMuscular once PRN  atorvastatin 40milliGRAM(s) Oral at bedtime  insulin lispro (HumaLOG) corrective regimen sliding scale  SubCutaneous at bedtime    IV FLUID/NUTRITION:  ferrous    sulfate 325milliGRAM(s) Oral daily  ascorbic acid 500milliGRAM(s) Oral daily    TOPICAL:    IMMUNOLOGIC & OTHER      Allergies    No Known Allergies    Intolerances        Vital Signs Last 24 Hrs  T(C): 37, Max: 37 (05-28 @ 07:00)  T(F): 98.6, Max: 98.6 (05-28 @ 07:00)  HR: 72 (63 - 72)  BP: 159/80 (155/79 - 193/93)  BP(mean): 115 (110 - 115)  RR: 12 (12 - 22)  SpO2: 99% (98% - 100%)      Daily     Daily   I&O's Detail    I & Os for current day (as of 28 May 2017 08:03)  =============================================  IN:    Total IN: 0 ml  ---------------------------------------------  OUT:    Voided: 200 ml    Total OUT: 200 ml  ---------------------------------------------  Total NET: -200 ml        REVIEW OF SYSTEMS:    CONSTITUTIONAL: No fever, weight loss, or fatigue  EYES: No eye pain, visual disturbances, or discharge  ENMT:  No difficulty hearing, tinnitus, vertigo; No sinus or throat pain  NECK: No pain or stiffness  RESPIRATORY: No cough, wheezing, chills or hemoptysis; No shortness of breath  CARDIOVASCULAR: No chest pain, palpitations, dizziness, or leg swelling  GASTROINTESTINAL: No abdominal or epigastric pain. No nausea, vomiting, or hematemesis; No diarrhea or constipation.   GENITOURINARY: No dysuria, frequency, hematuria, or incontinence  NEUROLOGICAL: Headache. No memory loss, loss of strength, numbness, or tremors  SKIN: No itching, burning, rashes, or lesions   LYMPH NODES: No enlarged glands  ENDOCRINE: No heat or cold intolerance; No hair loss  MUSCULOSKELETAL: No joint pain or swelling; No muscle, back, or extremity pain  PSYCHIATRIC: No depression, anxiety, mood swings, or difficulty sleeping  HEME/LYMPH: No easy bruising, or bleeding gums  ALLERY AND IMMUNOLOGIC: No hives or eczema      PHYSICAL EXAM:    GENERAL: NAD, well-groomed, well-developed  HEAD:  Atraumatic, Normocephalic, no facial drooping appreciated  EYES: EOMI, PERRLA, conjunctiva and sclera clear  ENMT: No tonsillar erythema, exudates, or enlargement; Moist mucous membranes, Good dentition, No lesions  NECK: Supple, No JVD, Normal thyroid  NERVOUS SYSTEM:  A&O x 1 to person only. Good concentration; CN 2-12 intact. Motor Strength 5/5 B/L upper and lower extremities; DTRs 2+ intact and symmetric  CHEST/LUNG: Clear to auscultation bilaterally; No rales, rhonchi, wheezing, or rubs  HEART: Regular rate; No murmurs, rubs, or gallops  ABDOMEN: Soft, Nontender, Nondistended; Bowel sounds present  EXTREMITIES:  B/L Pitting edema, 2+ Peripheral Pulses, No clubbing, cyanosis  SKIN: No rashes or lesions      LABS:                        10.4   6.2   )-----------( 293      ( 27 May 2017 07:29 )             34.3     CBC Full  -  ( 27 May 2017 07:29 )  WBC Count : 6.2 K/uL  Hemoglobin : 10.4 g/dL  Hematocrit : 34.3 %  Platelet Count - Automated : 293 K/uL  Mean Cell Volume : 80.5 fl  Mean Cell Hemoglobin : 24.4 pg  Mean Cell Hemoglobin Concentration : 30.3 g/dL  Auto Neutrophil # : 3.1 K/uL  Auto Lymphocyte # : 2.5 K/uL  Auto Monocyte # : 0.4 K/uL  Auto Eosinophil # : 0.2 K/uL  Auto Basophil # : 0.0 K/uL  Auto Neutrophil % : 50.4 %  Auto Lymphocyte % : 40.5 %  Auto Monocyte % : 5.7 %  Auto Eosinophil % : 2.9 %  Auto Basophil % : 0.3 %    05-27    137  |  98  |  11.0  ----------------------------<  254<H>  3.8   |  26.0  |  0.57    Ca    8.8      27 May 2017 07:29  Phos  3.0     05-26  Mg     1.8     05-26    TPro  7.3  /  Alb  4.0  /  TBili  0.1<L>  /  DBili  x   /  AST  14  /  ALT  13  /  AlkPhos  64  05-26    PT/INR - ( 26 May 2017 19:50 )   PT: 11.2 sec;   INR: 1.02 ratio    PTT - ( 26 May 2017 19:50 )  PTT:29.7 sec    Hemoglobin A1C, Whole Blood: 10.4 % (03-30 @ 11:37)            LIVER FUNCTIONS - ( 26 May 2017 17:03 )  Alb: 4.0 g/dL / Pro: 7.3 g/dL / ALK PHOS: 64 U/L / ALT: 13 U/L / AST: 14 U/L / GGT: x             RADIOLOGY & ADDITIONAL TESTS: Patient is a 47y old  Female who presents with a chief complaint of dysphagia and weakness (26 May 2017 23:04)      INTERVAL HPI/OVERNIGHT EVENTS:  47y  Female seen at bedside. No acute overnight events. Pt complains of H/A in L frontal lobe region, constant, 8/10 and is photo sensitive. A&O x 1 only to person, Denies N/V, weakness, numbness.     Tele: no significant events, will likely d/g from stroke unit     ANTIMICROBIAL:    NEUROLOGIC:  acetaminophen   Tablet. 650milliGRAM(s) Oral every 6 hours PRN    ONCOLOGIC:    HEMATOLOGIC:  dipyridamole 200 mG/aspirin 25 mG 1Capsule(s) Oral two times a day    GATROINTESTINAL:  pantoprazole    Tablet 40milliGRAM(s) Oral before breakfast    ENDO/METABOLIC:  insulin glargine Injectable (LANTUS) 15Unit(s) SubCutaneous at bedtime  insulin lispro (HumaLOG) corrective regimen sliding scale  SubCutaneous three times a day before meals  dextrose Gel 1Dose(s) Oral once PRN  dextrose 50% Injectable 12.5Gram(s) IV Push once  dextrose 50% Injectable 25Gram(s) IV Push once  glucagon  Injectable 1milliGRAM(s) IntraMuscular once PRN  atorvastatin 40milliGRAM(s) Oral at bedtime  insulin lispro (HumaLOG) corrective regimen sliding scale  SubCutaneous at bedtime    IV FLUID/NUTRITION:  ferrous    sulfate 325milliGRAM(s) Oral daily  ascorbic acid 500milliGRAM(s) Oral daily    TOPICAL:    IMMUNOLOGIC & OTHER    Vital Signs Last 24 Hrs  T(C): 37, Max: 37 (05-28 @ 07:00)  T(F): 98.6, Max: 98.6 (05-28 @ 07:00)  HR: 72 (63 - 72)  BP: 159/80 (155/79 - 193/93)  BP(mean): 115 (110 - 115)  RR: 12 (12 - 22)  SpO2: 99% (98% - 100%)    REVIEW OF SYSTEMS:    CONSTITUTIONAL: No fever, weight loss, or fatigue  EYES: No eye pain, visual disturbances, or discharge  ENMT:  No difficulty hearing, tinnitus, vertigo; No sinus or throat pain  RESPIRATORY: No cough, wheezing, chills or hemoptysis; No shortness of breath  CARDIOVASCULAR: No chest pain, palpitations, dizziness, or leg swelling  GASTROINTESTINAL: No abdominal or epigastric pain. No nausea, vomiting, or hematemesis; No diarrhea or constipation.   GENITOURINARY: No dysuria, frequency, hematuria, or incontinence  NEUROLOGICAL: Headache. No memory loss, loss of strength, numbness, or tremors  SKIN: No itching, burning, rashes, or lesions   LYMPH NODES: No enlarged glands  ENDOCRINE: No heat or cold intolerance; No hair loss  MUSCULOSKELETAL: No joint pain or swelling; No muscle, back, or extremity pain  PSYCHIATRIC: No depression, anxiety, mood swings, or difficulty sleeping  HEME/LYMPH: No easy bruising, or bleeding gums    PHYSICAL EXAM:    GENERAL: NAD, well-groomed, well-developed  HEAD:  Atraumatic, Normocephalic, no facial drooping appreciated  EYES: EOMI, PERRLA, conjunctiva and sclera clear  ENMT: No tonsillar erythema, exudates, or enlargement; Moist mucous membranes, Good dentition, No lesions  NECK: Supple, No JVD, Normal thyroid  NERVOUS SYSTEM:  A&O x 1 to person only. Good concentration; CN 2-12 intact. Motor Strength 5/5 B/L upper and lower extremities; DTRs 2+ intact and symmetric  CHEST/LUNG: Clear to auscultation bilaterally; No rales, rhonchi, wheezing, or rubs  HEART: Regular rate; No murmurs, rubs, or gallops  ABDOMEN: Soft, Nontender, Nondistended; Bowel sounds present  EXTREMITIES:  B/L Pitting edema, 2+ Peripheral Pulses, No clubbing, cyanosis  SKIN: No rashes or lesions      LABS:                        10.4   6.2   )-----------( 293      ( 27 May 2017 07:29 )             34.3     CBC Full  -  ( 27 May 2017 07:29 )  WBC Count : 6.2 K/uL  Hemoglobin : 10.4 g/dL  Hematocrit : 34.3 %  Platelet Count - Automated : 293 K/uL  Mean Cell Volume : 80.5 fl  Mean Cell Hemoglobin : 24.4 pg  Mean Cell Hemoglobin Concentration : 30.3 g/dL  Auto Neutrophil # : 3.1 K/uL  Auto Lymphocyte # : 2.5 K/uL  Auto Monocyte # : 0.4 K/uL  Auto Eosinophil # : 0.2 K/uL  Auto Basophil # : 0.0 K/uL  Auto Neutrophil % : 50.4 %  Auto Lymphocyte % : 40.5 %  Auto Monocyte % : 5.7 %  Auto Eosinophil % : 2.9 %  Auto Basophil % : 0.3 %    05-27    137  |  98  |  11.0  ----------------------------<  254<H>  3.8   |  26.0  |  0.57    Ca    8.8      27 May 2017 07:29  Phos  3.0     05-26  Mg     1.8     05-26    TPro  7.3  /  Alb  4.0  /  TBili  0.1<L>  /  DBili  x   /  AST  14  /  ALT  13  /  AlkPhos  64  05-26    PT/INR - ( 26 May 2017 19:50 )   PT: 11.2 sec;   INR: 1.02 ratio    PTT - ( 26 May 2017 19:50 )  PTT:29.7 sec    Hemoglobin A1C, Whole Blood: 10.4 % (03-30 @ 11:37)      LIVER FUNCTIONS - ( 26 May 2017 17:03 )  Alb: 4.0 g/dL / Pro: 7.3 g/dL / ALK PHOS: 64 U/L / ALT: 13 U/L / AST: 14 U/L / GGT: x

## 2017-05-28 NOTE — DISCHARGE NOTE ADULT - PLAN OF CARE
Rehab Follow recommendations for OT and PT. Follow diet outlined above to control your diabetes, high cholesterol and high blood pressure. Abstain from cocaine use and alcohol use. Take all medications as prescribed. Follow up with your Rehabilitation Follow recommendations for OT and PT. Follow diet outlined above to control your diabetes, high cholesterol and high blood pressure. Abstain from cocaine use and alcohol use. Take all medications as prescribed. Follow up with your PMD at [facility] within one week. Control of blood glucose: Hemoglobin A1c < 7.0 Follow diet above and take medications as prescribed. Follow up with your PMD at [facility] within one week. Blood pressure < 140/90 Follow diet above and take medications as prescribed. Follow up with your PMD at [facility] within one week. LDL < 70 Follow diet above and take medications as prescribed. Follow up with your PMD at [facility] within one week. Maintain BP, diabetes and cholesterol Follow diet above and take medications as prescribed. Follow up with your PMD at [facility] within one week. Follow recommendations for OT and PT. Follow diet outlined above to control your diabetes, high cholesterol and high blood pressure. Abstain from cocaine use and alcohol use. Take statin and aggrenox as prescribed. Follow up with your neurologist in one to two weeks. Follow recommendations for OT and PT. Follow diet outlined above to control your diabetes, high cholesterol and high blood pressure. Abstain from cocaine use and alcohol use. Take all medications as prescribed. Follow up with your neurologist in one to two weeks. Follow up with your PMD at [facility] tomorrow Follow diet above and take medications as prescribed. Follow up with your PMD at [facility] tomorrow. Follow diet above and take medications as prescribed. Follow up with your PMD at [facility] tomorrow. Follow diet above and take medications as prescribed. Follow up with your PMD at [facility] tomorrow. Follow diet above and take medications as prescribed. Follow up with your PMD at [facility] tomorrow.

## 2017-05-28 NOTE — DISCHARGE NOTE ADULT - NS AS DC STROKE PERSONAL RISK FACTORS
high blood pressure/blood clotting problems/obesity/tobacco use/carotid stenosis/diabetes/high cholesterol/history of a Stroke or TIA

## 2017-05-28 NOTE — DIETITIAN INITIAL EVALUATION ADULT. - SIGNS/SYMPTOMS
as evidenced by HgBA1c 10.4% as evidenced by increased estimated nutrient needs to optimize nutritional status.

## 2017-05-28 NOTE — DISCHARGE NOTE ADULT - PROVIDER TOKENS
FREE:[LAST:[[facility]],PHONE:[(   )    -],FAX:[(   )    -],ADDRESS:[[address] [place] [address]  Medical: [phone]  Fax: [phone]]] FREE:[LAST:[[facility]],PHONE:[(   )    -],FAX:[(   )    -],ADDRESS:[[address] [place] [address]  Medical: [phone]  Fax: [phone]]],TOKEN:'[id number]'

## 2017-05-28 NOTE — PHYSICAL THERAPY INITIAL EVALUATION ADULT - PERTINENT HX OF CURRENT PROBLEM, REHAB EVAL
47F right hand dominant with PMHx Left MCA infarct April 2017 with associated aphasia, substance abuse, DM II, HTN, presented to [facility] 5/26 with worsening aphasia, weakness and HA. c source. She was discharged home on ASA and was not interested in receiving rehab services. Head CT on this admission showed an evolving left MCA infarct and chronic right occipital infarct.

## 2017-05-28 NOTE — DISCHARGE NOTE ADULT - MEDICATION SUMMARY - MEDICATIONS TO STOP TAKING
I will STOP taking the medications listed below when I get home from the hospital:    aspirin 325 mg oral delayed release tablet  -- 1 tab(s) by mouth once a day

## 2017-05-28 NOTE — DISCHARGE NOTE ADULT - CONDITION (STATED IN TERMS THAT PERMIT A SPECIFIC MEASURABLE COMPARISON WITH CONDITION ON ADMISSION):
Pt facial weakness has resolved, her headache has improved and her lower extremity edema has improved.

## 2017-05-28 NOTE — PROGRESS NOTE ADULT - PROBLEM SELECTOR PLAN 1
Age undetermined with dysphagia and facial droop not TPA candidate will do closely neuro monitoring as advised by ED and Neuro recc;'s  NIH scale 0. Alert and oriented  seen by neuro - changed asa to aggrenox. Rec hypercoag b/w - will be sent (some sent already)  c/w lipitor  On stroke unit  given headache, edema and potential for stroke to convert or worsen as it is now evolving   Patient has occluded Left ICA, f/up repeat MRA head/neck and MRI brain   Neuro checks Q 1 h  PT/OT consult for am will give OOB w assistance orders for ambulation orders  SW for etoh/cocaine cessation  repeat echo ordered, TOLU noted from last adm, cardio called - needed a loop recorder on prior admission Age undetermined with dysphagia and facial droop not TPA candidate will do closely neuro monitoring as advised by ED and Neuro recc;'s  A&O x 1 to person only.   seen by neuro - changed asa to aggrenox. Rec hypercoag b/w - will be sent (some sent already)  c/w lipitor  On stroke unit  given headache, edema and potential for stroke to convert or worsen as it is now evolving   Patient has occluded Left ICA, f/up repeat MRA head/neck and MRI brain   Neuro checks Q 1 h  PT/OT consult for am will give OOB w assistance orders for ambulation orders  SW for etoh/cocaine cessation  repeat echo ordered, TOLU noted from last adm, cardio called - needed a loop recorder on prior admission Age undetermined with dysphagia and facial droop not TPA candidate will do closely neuro monitoring as advised by ED and Neuro recc;'s  seen by neuro - changed asa to aggrenox. Rec hypercoag b/w   c/w lipitor  since tele negative, can d/g from stroke unit and keep on any monitored bed   Currently On stroke unit  given headache, edema and potential for stroke to convert or worsen as it is now evolving - vascular surgery patito noted - will call neurosurgery pending MRA head and neck   change neurochecks to q4h  PT/OT consult for am will give OOB w assistance orders for ambulation orders  SW for etoh/cocaine cessation  repeat echo ordered, TOLU noted from last adm, cardio called - needed a loop recorder on prior admission but had wanted to do outpatient and not in.   PMNR patito noted - will follow her inpatient

## 2017-05-28 NOTE — PROGRESS NOTE ADULT - PROBLEM SELECTOR PLAN 2
vascular f/up called    Not likely to be any f/up with w/up as this patient signed ama on last admission, ? compliance. Vascular recommends no intervention, F/U w/vascular surgery after imaging. vascular f/up called   -neurosurgery eval as above  HYPERTRIGLYCERIDEMIA - statin and aggrenox

## 2017-05-28 NOTE — DISCHARGE NOTE ADULT - ADDITIONAL INSTRUCTIONS
Appointment tomorrow, 5/31/17 at [facility] at 10:00am.   [facility]  [address] [place] [address]  Medical: [phone]  Fax: [phone]

## 2017-05-28 NOTE — DISCHARGE NOTE ADULT - CARE PLAN
Principal Discharge DX:	Cerebrovascular accident (CVA) due to stenosis of middle cerebral artery, unspecified blood vessel laterality  Goal:	Rehab  Instructions for follow-up, activity and diet:	Follow recommendations for OT and PT. Follow diet outlined above to control your diabetes, high cholesterol and high blood pressure. Abstain from cocaine use and alcohol use. Take all medications as prescribed. Follow up with your  Secondary Diagnosis:	Aphasia  Secondary Diagnosis:	Diabetes  Secondary Diagnosis:	HTN (hypertension)  Secondary Diagnosis:	ICAO (internal carotid artery occlusion), left  Secondary Diagnosis:	Hyperlipidemia Principal Discharge DX:	Cerebrovascular accident (CVA) due to stenosis of middle cerebral artery, unspecified blood vessel laterality  Goal:	Rehabilitation  Instructions for follow-up, activity and diet:	Follow recommendations for OT and PT. Follow diet outlined above to control your diabetes, high cholesterol and high blood pressure. Abstain from cocaine use and alcohol use. Take all medications as prescribed. Follow up with your  Secondary Diagnosis:	Aphasia  Goal:	Rehabilitation  Instructions for follow-up, activity and diet:	Follow recommendations for OT and PT. Follow diet outlined above to control your diabetes, high cholesterol and high blood pressure. Abstain from cocaine use and alcohol use. Take all medications as prescribed. Follow up with your PMD at [facility] within one week.  Secondary Diagnosis:	Diabetes  Goal:	Control of blood glucose: Hemoglobin A1c < 7.0  Instructions for follow-up, activity and diet:	Follow diet above and take medications as prescribed. Follow up with your PMD at [facility] within one week.  Secondary Diagnosis:	HTN (hypertension)  Goal:	Blood pressure < 140/90  Instructions for follow-up, activity and diet:	Follow diet above and take medications as prescribed. Follow up with your PMD at [facility] within one week.  Secondary Diagnosis:	ICAO (internal carotid artery occlusion), left  Secondary Diagnosis:	Hyperlipidemia  Goal:	LDL < 70  Instructions for follow-up, activity and diet:	Follow diet above and take medications as prescribed. Follow up with your PMD at [facility] within one week. Principal Discharge DX:	Cerebrovascular accident (CVA) due to stenosis of middle cerebral artery, unspecified blood vessel laterality  Goal:	Rehabilitation  Instructions for follow-up, activity and diet:	Follow recommendations for OT and PT. Follow diet outlined above to control your diabetes, high cholesterol and high blood pressure. Abstain from cocaine use and alcohol use. Take all medications as prescribed. Follow up with your  Secondary Diagnosis:	Aphasia  Goal:	Rehabilitation  Instructions for follow-up, activity and diet:	Follow recommendations for OT and PT. Follow diet outlined above to control your diabetes, high cholesterol and high blood pressure. Abstain from cocaine use and alcohol use. Take all medications as prescribed. Follow up with your PMD at [facility] within one week.  Secondary Diagnosis:	Diabetes  Goal:	Control of blood glucose: Hemoglobin A1c < 7.0  Instructions for follow-up, activity and diet:	Follow diet above and take medications as prescribed. Follow up with your PMD at [facility] within one week.  Secondary Diagnosis:	HTN (hypertension)  Goal:	Blood pressure < 140/90  Instructions for follow-up, activity and diet:	Follow diet above and take medications as prescribed. Follow up with your PMD at [facility] within one week.  Secondary Diagnosis:	ICAO (internal carotid artery occlusion), left  Secondary Diagnosis:	Hyperlipidemia  Goal:	LDL < 70  Instructions for follow-up, activity and diet:	Follow diet above and take medications as prescribed. Follow up with your PMD at [facility] within one week. Principal Discharge DX:	Cerebrovascular accident (CVA) due to stenosis of middle cerebral artery, unspecified blood vessel laterality  Goal:	Rehabilitation  Instructions for follow-up, activity and diet:	Follow recommendations for OT and PT. Follow diet outlined above to control your diabetes, high cholesterol and high blood pressure. Abstain from cocaine use and alcohol use. Take all medications as prescribed. Follow up with your  Secondary Diagnosis:	Aphasia  Goal:	Rehabilitation  Instructions for follow-up, activity and diet:	Follow recommendations for OT and PT. Follow diet outlined above to control your diabetes, high cholesterol and high blood pressure. Abstain from cocaine use and alcohol use. Take all medications as prescribed. Follow up with your PMD at [facility] within one week.  Secondary Diagnosis:	Diabetes  Goal:	Control of blood glucose: Hemoglobin A1c < 7.0  Instructions for follow-up, activity and diet:	Follow diet above and take medications as prescribed. Follow up with your PMD at [facility] within one week.  Secondary Diagnosis:	HTN (hypertension)  Goal:	Blood pressure < 140/90  Instructions for follow-up, activity and diet:	Follow diet above and take medications as prescribed. Follow up with your PMD at [facility] within one week.  Secondary Diagnosis:	ICAO (internal carotid artery occlusion), left  Goal:	Maintain BP, diabetes and cholesterol  Instructions for follow-up, activity and diet:	Follow diet above and take medications as prescribed. Follow up with your PMD at [facility] within one week.  Secondary Diagnosis:	Hyperlipidemia  Goal:	LDL < 70  Instructions for follow-up, activity and diet:	Follow diet above and take medications as prescribed. Follow up with your PMD at [facility] within one week. Principal Discharge DX:	Cerebrovascular accident (CVA) due to stenosis of middle cerebral artery, unspecified blood vessel laterality  Goal:	Rehabilitation  Instructions for follow-up, activity and diet:	Follow recommendations for OT and PT. Follow diet outlined above to control your diabetes, high cholesterol and high blood pressure. Abstain from cocaine use and alcohol use. Take all medications as prescribed. Follow up with your  Secondary Diagnosis:	Aphasia  Goal:	Rehabilitation  Instructions for follow-up, activity and diet:	Follow recommendations for OT and PT. Follow diet outlined above to control your diabetes, high cholesterol and high blood pressure. Abstain from cocaine use and alcohol use. Take all medications as prescribed. Follow up with your PMD at [facility] within one week.  Secondary Diagnosis:	Diabetes  Goal:	Control of blood glucose: Hemoglobin A1c < 7.0  Instructions for follow-up, activity and diet:	Follow diet above and take medications as prescribed. Follow up with your PMD at [facility] within one week.  Secondary Diagnosis:	HTN (hypertension)  Goal:	Blood pressure < 140/90  Instructions for follow-up, activity and diet:	Follow diet above and take medications as prescribed. Follow up with your PMD at [facility] within one week.  Secondary Diagnosis:	ICAO (internal carotid artery occlusion), left  Goal:	Maintain BP, diabetes and cholesterol  Instructions for follow-up, activity and diet:	Follow diet above and take medications as prescribed. Follow up with your PMD at [facility] within one week.  Secondary Diagnosis:	Hyperlipidemia  Goal:	LDL < 70  Instructions for follow-up, activity and diet:	Follow diet above and take medications as prescribed. Follow up with your PMD at [facility] within one week. Principal Discharge DX:	Cerebrovascular accident (CVA) due to stenosis of middle cerebral artery, unspecified blood vessel laterality  Goal:	Rehabilitation  Instructions for follow-up, activity and diet:	Follow recommendations for OT and PT. Follow diet outlined above to control your diabetes, high cholesterol and high blood pressure. Abstain from cocaine use and alcohol use. Take statin and aggrenox as prescribed. Follow up with your neurologist in one to two weeks.  Secondary Diagnosis:	Aphasia  Goal:	Rehabilitation  Instructions for follow-up, activity and diet:	Follow recommendations for OT and PT. Follow diet outlined above to control your diabetes, high cholesterol and high blood pressure. Abstain from cocaine use and alcohol use. Take all medications as prescribed. Follow up with your neurologist in one to two weeks. Follow up with your PMD at [facility] tomorrow  Secondary Diagnosis:	Diabetes  Goal:	Control of blood glucose: Hemoglobin A1c < 7.0  Instructions for follow-up, activity and diet:	Follow diet above and take medications as prescribed. Follow up with your PMD at [facility] tomorrow.  Secondary Diagnosis:	HTN (hypertension)  Goal:	Blood pressure < 140/90  Instructions for follow-up, activity and diet:	Follow diet above and take medications as prescribed. Follow up with your PMD at [facility] tomorrow.  Secondary Diagnosis:	ICAO (internal carotid artery occlusion), left  Goal:	Maintain BP, diabetes and cholesterol  Instructions for follow-up, activity and diet:	Follow diet above and take medications as prescribed. Follow up with your PMD at [facility] tomorrow.  Secondary Diagnosis:	Hyperlipidemia  Goal:	LDL < 70  Instructions for follow-up, activity and diet:	Follow diet above and take medications as prescribed. Follow up with your PMD at [facility] tomorrow. Principal Discharge DX:	Cerebrovascular accident (CVA) due to stenosis of middle cerebral artery, unspecified blood vessel laterality  Goal:	Rehabilitation  Instructions for follow-up, activity and diet:	Follow recommendations for OT and PT. Follow diet outlined above to control your diabetes, high cholesterol and high blood pressure. Abstain from cocaine use and alcohol use. Take statin and aggrenox as prescribed. Follow up with your neurologist in one to two weeks.  Secondary Diagnosis:	Aphasia  Goal:	Rehabilitation  Instructions for follow-up, activity and diet:	Follow recommendations for OT and PT. Follow diet outlined above to control your diabetes, high cholesterol and high blood pressure. Abstain from cocaine use and alcohol use. Take all medications as prescribed. Follow up with your neurologist in one to two weeks. Follow up with your PMD at [facility] tomorrow  Secondary Diagnosis:	Diabetes  Goal:	Control of blood glucose: Hemoglobin A1c < 7.0  Instructions for follow-up, activity and diet:	Follow diet above and take medications as prescribed. Follow up with your PMD at [facility] tomorrow.  Secondary Diagnosis:	HTN (hypertension)  Goal:	Blood pressure < 140/90  Instructions for follow-up, activity and diet:	Follow diet above and take medications as prescribed. Follow up with your PMD at [facility] tomorrow.  Secondary Diagnosis:	ICAO (internal carotid artery occlusion), left  Goal:	Maintain BP, diabetes and cholesterol  Instructions for follow-up, activity and diet:	Follow diet above and take medications as prescribed. Follow up with your PMD at [facility] tomorrow.  Secondary Diagnosis:	Hyperlipidemia  Goal:	LDL < 70  Instructions for follow-up, activity and diet:	Follow diet above and take medications as prescribed. Follow up with your PMD at [facility] tomorrow. Principal Discharge DX:	Cerebrovascular accident (CVA) due to stenosis of middle cerebral artery, unspecified blood vessel laterality  Goal:	Rehabilitation  Instructions for follow-up, activity and diet:	Follow recommendations for OT and PT. Follow diet outlined above to control your diabetes, high cholesterol and high blood pressure. Abstain from cocaine use and alcohol use. Take statin and aggrenox as prescribed. Follow up with your neurologist in one to two weeks.  Secondary Diagnosis:	Aphasia  Goal:	Rehabilitation  Instructions for follow-up, activity and diet:	Follow recommendations for OT and PT. Follow diet outlined above to control your diabetes, high cholesterol and high blood pressure. Abstain from cocaine use and alcohol use. Take all medications as prescribed. Follow up with your neurologist in one to two weeks. Follow up with your PMD at [facility] tomorrow  Secondary Diagnosis:	Diabetes  Goal:	Control of blood glucose: Hemoglobin A1c < 7.0  Instructions for follow-up, activity and diet:	Follow diet above and take medications as prescribed. Follow up with your PMD at [facility] tomorrow.  Secondary Diagnosis:	HTN (hypertension)  Goal:	Blood pressure < 140/90  Instructions for follow-up, activity and diet:	Follow diet above and take medications as prescribed. Follow up with your PMD at [facility] tomorrow.  Secondary Diagnosis:	ICAO (internal carotid artery occlusion), left  Goal:	Maintain BP, diabetes and cholesterol  Instructions for follow-up, activity and diet:	Follow diet above and take medications as prescribed. Follow up with your PMD at [facility] tomorrow.  Secondary Diagnosis:	Hyperlipidemia  Goal:	LDL < 70  Instructions for follow-up, activity and diet:	Follow diet above and take medications as prescribed. Follow up with your PMD at [facility] tomorrow.

## 2017-05-28 NOTE — DISCHARGE NOTE ADULT - HOSPITAL COURSE
48 y/o female w/PMH DM, HTN, HLD, Multidrug abuse (crack, EtOH, nicotine), CVA presents to the ED with complaints of weakness and changes in speech and H/A, ht sensitivity but no changes in  vision or trouble with loud sounds. She admits to some increased confusion from baseline. Pt had stroke 6w ago and was at [facility] for treatment and left AMA.    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 patient has had a baseline of expressive aphasia. 46 y/o female w/PMH DM, HTN, HLD, Multidrug abuse (crack, EtOH, nicotine), CVA presents to the ED with complaints of weakness and changes in speech and H/A, ht sensitivity but no changes in  vision or trouble with loud sounds. She admits to some increased confusion from baseline. Pt had stroke 6w ago and was at [facility] for treatment and left AMA.    Imaging shows L ICA occlusion and L MCA occlusion. Vascular recommended no interventions. Neurology recommended Aggrenox for anticoagulation, tight control of her hypertension, diabetes and hyperlipidemia. They also recommended that she refrain from crack, alcohol and nicotine use. Pt is in denial of her substance abuse and is noncompliant with medications at home. In hospital she was treated with Aggrenox for anticoagulation, statin for hyperlipidemia, Lantus and sliding scale for diabetes and was on a consistent carbohydrate DASH/TLC diet. Her aphasia resolved, and her headache improved. She continues to have a poor memory and is only oriented to person.    She is medically optimized for discharge. 46 y/o female w/PMH DM, HTN, HLD, Multidrug abuse (crack, EtOH, nicotine), CVA presents to the ED with complaints of weakness and changes in speech and H/A, but no changes in  vision or trouble with loud sounds. She admits to some increased confusion from baseline. Pt had stroke 6w ago and was at [facility] for treatment and left AMA.    Imaging shows L ICA occlusion and L MCA occlusion. Vascular recommended no interventions at this time. Neurology recommended Aggrenox for anticoagulation, tight control of her hypertension, diabetes and hyperlipidemia, as well as refraining from crack, alcohol and nicotine use. Pt is in denial of her substance abuse and is noncompliant with medications at home. In hospital she was treated with Aggrenox for anticoagulation, statin for hyperlipidemia, Lantus and sliding scale for diabetes and was on a consistent carbohydrate DASH/TLC diet. Her aphasia resolved, and her headache improved. She continues to have a poor memory and is only oriented to person.    She is medically optimized for discharge. 46 y/o female w/PMH DM, HTN, HLD, Multidrug abuse (crack, EtOH, nicotine), CVA presents to the ED with complaints of weakness and changes in speech and H/A, but no changes in  vision or trouble with loud sounds. She admits to some increased confusion from baseline. Pt had stroke 6w ago and was at [facility] for treatment and left AMA.    Imaging shows L ICA occlusion and L MCA occlusion. Vascular recommended no interventions at this time. Neurology recommended Aggrenox for anticoagulation, tight control of her hypertension, diabetes and hyperlipidemia, as well as refraining from crack, alcohol and nicotine use. Pt is in denial of her substance abuse and is noncompliant with medications at home. In hospital she was treated with Aggrenox for anticoagulation, statin for hyperlipidemia, Lantus and sliding scale for diabetes and was on a consistent carbohydrate DASH/TLC diet. Her aphasia resolved, and her headache improved. She continues to have a poor memory and is only oriented to person. Pt was seen by PT and they do not recommend rehab at this time.    She is medically optimized for discharge. 46 y/o female w/PMH DM, HTN, HLD, Multidrug abuse (crack, EtOH, nicotine), CVA presents to the ED with complaints of weakness and changes in speech and H/A, but no changes in  vision or trouble with loud sounds. She admits to some increased confusion from baseline. Pt had stroke 6w ago and was at [facility] for treatment and left AMA.    Imaging shows L ICA occlusion and L MCA occlusion. Vascular recommended no interventions at this time. Neurology recommended Aggrenox for anticoagulation, tight control of her hypertension, diabetes and hyperlipidemia, as well as refraining from crack, alcohol and nicotine use. Pt is in denial of her substance abuse and is noncompliant with medications at home. In hospital she was treated with Aggrenox for anticoagulation, statin for hyperlipidemia, Lantus and sliding scale for diabetes and was on a consistent carbohydrate DASH/TLC diet. Her aphasia resolved, and her headache improved. She continues to have a poor memory and is only oriented to person. Pt was seen by PT and they do not recommend rehab at this time.  Patient was seen by Psychiatrist Dr. Limon for depression likely secondary to a traumatic loss of a child. She was evaluated and Dr. Limon determined that there was no psychiatric contraindication to discharge. Patient now discharged to followup with PCP.      		    She is medically optimized for discharge.

## 2017-05-28 NOTE — DISCHARGE NOTE ADULT - CARE PROVIDER_API CALL
[facility],   [address] [place] [address]  Medical: [phone]  Fax: [phone]  Phone: (   )    -  Fax: (   )    - [facility],   [address] [place] [address]  Medical: [phone]  Fax: [phone]  Phone: (   )    -  Fax: (   )    -    Travis Hernández (SOSA), Neurology  [address] [place] [address]  Phone: [phone]  Fax: [phone]

## 2017-05-29 DIAGNOSIS — I63.519 CEREBRAL INFARCTION DUE TO UNSPECIFIED OCCLUSION OR STENOSIS OF UNSPECIFIED MIDDLE CEREBRAL ARTERY: ICD-10-CM

## 2017-05-29 LAB
ALBUMIN SERPL ELPH-MCNC: 3.9 G/DL — SIGNIFICANT CHANGE UP (ref 3.3–5.2)
ALP SERPL-CCNC: 60 U/L — SIGNIFICANT CHANGE UP (ref 40–120)
ALT FLD-CCNC: 13 U/L — SIGNIFICANT CHANGE UP
AMPHET UR-MCNC: NEGATIVE — SIGNIFICANT CHANGE UP
ANION GAP SERPL CALC-SCNC: 15 MMOL/L — SIGNIFICANT CHANGE UP (ref 5–17)
ANISOCYTOSIS BLD QL: SLIGHT — SIGNIFICANT CHANGE UP
AST SERPL-CCNC: 13 U/L — SIGNIFICANT CHANGE UP
BARBITURATES UR SCN-MCNC: NEGATIVE — SIGNIFICANT CHANGE UP
BASOPHILS # BLD AUTO: 0 K/UL — SIGNIFICANT CHANGE UP (ref 0–0.2)
BASOPHILS NFR BLD AUTO: 0.3 % — SIGNIFICANT CHANGE UP (ref 0–2)
BENZODIAZ UR-MCNC: NEGATIVE — SIGNIFICANT CHANGE UP
BILIRUB SERPL-MCNC: 0.1 MG/DL — LOW (ref 0.4–2)
BUN SERPL-MCNC: 14 MG/DL — SIGNIFICANT CHANGE UP (ref 8–20)
CALCIUM SERPL-MCNC: 8.8 MG/DL — SIGNIFICANT CHANGE UP (ref 8.6–10.2)
CARDIOLIPIN AB SER-ACNC: POSITIVE
CHLORIDE SERPL-SCNC: 101 MMOL/L — SIGNIFICANT CHANGE UP (ref 98–107)
CO2 SERPL-SCNC: 22 MMOL/L — SIGNIFICANT CHANGE UP (ref 22–29)
COCAINE METAB.OTHER UR-MCNC: NEGATIVE — SIGNIFICANT CHANGE UP
CREAT SERPL-MCNC: 0.61 MG/DL — SIGNIFICANT CHANGE UP (ref 0.5–1.3)
DACRYOCYTES BLD QL SMEAR: SLIGHT — SIGNIFICANT CHANGE UP
EOSINOPHIL # BLD AUTO: 0.2 K/UL — SIGNIFICANT CHANGE UP (ref 0–0.5)
EOSINOPHIL NFR BLD AUTO: 2.4 % — SIGNIFICANT CHANGE UP (ref 0–6)
GLUCOSE SERPL-MCNC: 219 MG/DL — HIGH (ref 70–115)
HCT VFR BLD CALC: 37 % — SIGNIFICANT CHANGE UP (ref 37–47)
HCYS SERPL-MCNC: 6.7 UMOL/L — SIGNIFICANT CHANGE UP (ref 5–15)
HGB BLD-MCNC: 11.5 G/DL — LOW (ref 12–16)
HYPOCHROMIA BLD QL: SLIGHT — SIGNIFICANT CHANGE UP
LYMPHOCYTES # BLD AUTO: 2.7 K/UL — SIGNIFICANT CHANGE UP (ref 1–4.8)
LYMPHOCYTES # BLD AUTO: 40.2 % — SIGNIFICANT CHANGE UP (ref 20–55)
MACROCYTES BLD QL: SLIGHT — SIGNIFICANT CHANGE UP
MAGNESIUM SERPL-MCNC: 2 MG/DL — SIGNIFICANT CHANGE UP (ref 1.8–2.6)
MCHC RBC-ENTMCNC: 24.9 PG — LOW (ref 27–31)
MCHC RBC-ENTMCNC: 31.1 G/DL — LOW (ref 32–36)
MCV RBC AUTO: 80.1 FL — LOW (ref 81–99)
METHADONE UR-MCNC: NEGATIVE — SIGNIFICANT CHANGE UP
MICROCYTES BLD QL: SLIGHT — SIGNIFICANT CHANGE UP
MONOCYTES # BLD AUTO: 0.4 K/UL — SIGNIFICANT CHANGE UP (ref 0–0.8)
MONOCYTES NFR BLD AUTO: 5.7 % — SIGNIFICANT CHANGE UP (ref 3–10)
NEUTROPHILS # BLD AUTO: 3.4 K/UL — SIGNIFICANT CHANGE UP (ref 1.8–8)
NEUTROPHILS NFR BLD AUTO: 51.3 % — SIGNIFICANT CHANGE UP (ref 37–73)
OPIATES UR-MCNC: NEGATIVE — SIGNIFICANT CHANGE UP
OVALOCYTES BLD QL SMEAR: SLIGHT — SIGNIFICANT CHANGE UP
PCP SPEC-MCNC: SIGNIFICANT CHANGE UP
PCP UR-MCNC: NEGATIVE — SIGNIFICANT CHANGE UP
PHOSPHATE SERPL-MCNC: 3.9 MG/DL — SIGNIFICANT CHANGE UP (ref 2.4–4.7)
PLAT MORPH BLD: NORMAL — SIGNIFICANT CHANGE UP
PLATELET # BLD AUTO: 280 K/UL — SIGNIFICANT CHANGE UP (ref 150–400)
POIKILOCYTOSIS BLD QL AUTO: SLIGHT — SIGNIFICANT CHANGE UP
POTASSIUM SERPL-MCNC: 3.8 MMOL/L — SIGNIFICANT CHANGE UP (ref 3.5–5.3)
POTASSIUM SERPL-SCNC: 3.8 MMOL/L — SIGNIFICANT CHANGE UP (ref 3.5–5.3)
PROT SERPL-MCNC: 7.1 G/DL — SIGNIFICANT CHANGE UP (ref 6.6–8.7)
RBC # BLD: 4.62 M/UL — SIGNIFICANT CHANGE UP (ref 4.4–5.2)
RBC # FLD: 20.5 % — HIGH (ref 11–15.6)
RBC BLD AUTO: ABNORMAL
SCHISTOCYTES BLD QL AUTO: SLIGHT — SIGNIFICANT CHANGE UP
SODIUM SERPL-SCNC: 138 MMOL/L — SIGNIFICANT CHANGE UP (ref 135–145)
T PALLIDUM AB TITR SER: NEGATIVE — SIGNIFICANT CHANGE UP
THC UR QL: NEGATIVE — SIGNIFICANT CHANGE UP
WBC # BLD: 6.7 K/UL — SIGNIFICANT CHANGE UP (ref 4.8–10.8)
WBC # FLD AUTO: 6.7 K/UL — SIGNIFICANT CHANGE UP (ref 4.8–10.8)

## 2017-05-29 PROCEDURE — 70547 MR ANGIOGRAPHY NECK W/O DYE: CPT | Mod: 26

## 2017-05-29 PROCEDURE — 70544 MR ANGIOGRAPHY HEAD W/O DYE: CPT | Mod: 26,59

## 2017-05-29 PROCEDURE — 99239 HOSP IP/OBS DSCHRG MGMT >30: CPT

## 2017-05-29 PROCEDURE — 70551 MRI BRAIN STEM W/O DYE: CPT | Mod: 26

## 2017-05-29 RX ORDER — SENNA PLUS 8.6 MG/1
2 TABLET ORAL AT BEDTIME
Qty: 0 | Refills: 0 | Status: DISCONTINUED | OUTPATIENT
Start: 2017-05-29 | End: 2017-05-30

## 2017-05-29 RX ORDER — BACITRACIN ZINC 500 UNIT/G
1 OINTMENT IN PACKET (EA) TOPICAL
Qty: 0 | Refills: 0 | Status: DISCONTINUED | OUTPATIENT
Start: 2017-05-29 | End: 2017-05-30

## 2017-05-29 RX ORDER — POLYETHYLENE GLYCOL 3350 17 G/17G
17 POWDER, FOR SOLUTION ORAL DAILY
Qty: 0 | Refills: 0 | Status: DISCONTINUED | OUTPATIENT
Start: 2017-05-29 | End: 2017-05-30

## 2017-05-29 RX ORDER — POLYETHYLENE GLYCOL 3350 17 G/17G
17 POWDER, FOR SOLUTION ORAL DAILY
Qty: 0 | Refills: 0 | Status: DISCONTINUED | OUTPATIENT
Start: 2017-05-29 | End: 2017-05-29

## 2017-05-29 RX ORDER — DOCUSATE SODIUM 100 MG
100 CAPSULE ORAL
Qty: 0 | Refills: 0 | Status: DISCONTINUED | OUTPATIENT
Start: 2017-05-29 | End: 2017-05-30

## 2017-05-29 RX ADMIN — Medication 3: at 17:36

## 2017-05-29 RX ADMIN — Medication 650 MILLIGRAM(S): at 03:30

## 2017-05-29 RX ADMIN — PANTOPRAZOLE SODIUM 40 MILLIGRAM(S): 20 TABLET, DELAYED RELEASE ORAL at 05:21

## 2017-05-29 RX ADMIN — ASPIRIN AND DIPYRIDAMOLE 1 CAPSULE(S): 25; 200 CAPSULE, EXTENDED RELEASE ORAL at 05:21

## 2017-05-29 RX ADMIN — ATORVASTATIN CALCIUM 40 MILLIGRAM(S): 80 TABLET, FILM COATED ORAL at 22:07

## 2017-05-29 RX ADMIN — Medication 500 MILLIGRAM(S): at 11:30

## 2017-05-29 RX ADMIN — Medication 650 MILLIGRAM(S): at 23:00

## 2017-05-29 RX ADMIN — Medication 650 MILLIGRAM(S): at 22:06

## 2017-05-29 RX ADMIN — Medication 100 MILLIGRAM(S): at 17:36

## 2017-05-29 RX ADMIN — POLYETHYLENE GLYCOL 3350 17 GRAM(S): 17 POWDER, FOR SOLUTION ORAL at 11:45

## 2017-05-29 RX ADMIN — Medication 3: at 07:52

## 2017-05-29 RX ADMIN — Medication 650 MILLIGRAM(S): at 02:24

## 2017-05-29 RX ADMIN — Medication 650 MILLIGRAM(S): at 10:05

## 2017-05-29 RX ADMIN — Medication 1 APPLICATION(S): at 22:07

## 2017-05-29 RX ADMIN — Medication 650 MILLIGRAM(S): at 09:05

## 2017-05-29 RX ADMIN — ASPIRIN AND DIPYRIDAMOLE 1 CAPSULE(S): 25; 200 CAPSULE, EXTENDED RELEASE ORAL at 17:42

## 2017-05-29 RX ADMIN — Medication 325 MILLIGRAM(S): at 11:30

## 2017-05-29 RX ADMIN — Medication 1: at 11:29

## 2017-05-29 RX ADMIN — INSULIN GLARGINE 15 UNIT(S): 100 INJECTION, SOLUTION SUBCUTANEOUS at 22:07

## 2017-05-29 NOTE — PROGRESS NOTE ADULT - PROBLEM SELECTOR PLAN 4
aspiration precautions,as above, has some chronicity  f/up with diet   dysphagia screen done passed - tolerating diet now

## 2017-05-29 NOTE — PROGRESS NOTE ADULT - PROBLEM SELECTOR PLAN 5
a1c 10.4 ? compliance though states she is. c/w 211-179  Lantus 15 units q hs home dose continued   strict control of c/s to help with the medical management of her occluded Left ICA a1c 10.4 ? compliance though states she is. c/w 211-179  Lantus 15 units q hs home dose continued 2 units breakfast 1 at dinner yesterday  strict control of c/s to help with the medical management of her occluded Left ICA

## 2017-05-29 NOTE — PROGRESS NOTE ADULT - SUBJECTIVE AND OBJECTIVE BOX
Repeat MRA of neck (TOF) and brain reviewed.    Both confirm previous studies demonstrating patent extracranial left carotid with occlusion of intracranial ICA and MCA.    No extra-cranial carotid intervention indicated or planed.

## 2017-05-29 NOTE — PROGRESS NOTE ADULT - ASSESSMENT
46 y/o female with pmhx DM II, HTN,GERD, +cocaine and etoh use currently (last time day prior to admission with cocaine) presents to the ED with complaints of weakness and changes in speech. CT scan done with + findings of CVA but due to unknown timing not a TPA candidate. Patient was here in April and found to have a stroke at that time, as well as an occluded left ICA. Now with worsening sx and noted evolving left tempo-parietal left occipital lobe cva with edema and effacement of cortical sulci. No intervention at that time, patient had declined and signed ama 48 y/o female with pmhx DM II, HTN,GERD, +cocaine and etoh use currently (last time day prior to admission with cocaine) presents to the ED with complaints of weakness and changes in speech. CT scan done with + findings of CVA but due to unknown timing not a TPA candidate. Patient was here in April and found to have a stroke at that time, as well as an occluded left ICA. Now with worsening sx and noted evolving left tempo-parietal left occipital lobe cva with edema and effacement of cortical sulci. No intervention at that time, patient had declined and signed ama. Vascular - no intervention.

## 2017-05-29 NOTE — PROGRESS NOTE ADULT - PROBLEM SELECTOR PLAN 1
Age undetermined with dysphagia and facial droop not TPA candidate will do closely neuro monitoring as advised by ED and Neuro recc;'s  seen by neuro - changed asa to aggrenox. Rec hypercoag b/w   c/w lipitor  since tele negative, can d/g from stroke unit and keep on any monitored bed   Currently On stroke unit  given headache, edema and potential for stroke to convert or worsen as it is now evolving - vascular surgery patito noted - will call neurosurgery pending MRA head and neck   change neurochecks to q4h  PT/OT consult for am will give OOB w assistance orders for ambulation orders  SW for etoh/cocaine cessation  repeat echo ordered, TOLU noted from last adm, cardio called - needed a loop recorder on prior admission but had wanted to do outpatient and not in.   PMNR patito noted - will follow her inpatient Age undetermined with dysphagia and facial droop not TPA candidate will do closely neuro monitoring as advised by ED and Neuro recc;'s  seen by neuro - changed asa to aggrenox. Rec hypercoag b/w   c/w lipitor since tele negative, can d/g from stroke unit and keep on any monitored bed Currently On stroke unit given headache, edema and potential for stroke to convert or worsen as it is now evolving - vascular surgery patito noted - will call neurosurgery pending MRA head and neck   change neurochecks to q4h  PT/OT consult for am will give OOB w assistance orders for ambulation orders  SW for etoh/cocaine cessation  repeat echo ordered, TOLU noted from last adm, cardio called - needed a loop recorder on prior admission but had wanted to do outpatient and not in.   PMNR patito noted - will follow her inpatient

## 2017-05-29 NOTE — PROGRESS NOTE ADULT - PROBLEM SELECTOR PLAN 1
She has multiple risk factors for stroke including uncontrolled diabetes, obesity, cocaine use.  Patient denies any worsening of stroke symptoms and states that she came because of headache.  No aphasia on examination at this time.  MRI brain, MRA brain, MRA neck do not show any new findings compared to prior studies.  Continue Aggrenox for secondary stroke prevention.  Patient is non-compliant with treatment - needs f/u for diabetes.  Needs statin therapy, weight loss, etc.  Will sign off at this time. Please call back if new issues arise. Discussed with Dr. Carty.

## 2017-05-29 NOTE — PROGRESS NOTE ADULT - SUBJECTIVE AND OBJECTIVE BOX
Interval History: States that she has a headache and that was the reason that she came to the hospital.  Denies any increased weakness.    MEDICATIONS  (STANDING):  insulin glargine Injectable (LANTUS) 15Unit(s) SubCutaneous at bedtime  insulin lispro (HumaLOG) corrective regimen sliding scale  SubCutaneous three times a day before meals  dextrose 50% Injectable 12.5Gram(s) IV Push once  dextrose 50% Injectable 25Gram(s) IV Push once  atorvastatin 40milliGRAM(s) Oral at bedtime  pantoprazole    Tablet 40milliGRAM(s) Oral before breakfast  insulin lispro (HumaLOG) corrective regimen sliding scale  SubCutaneous at bedtime  ferrous    sulfate 325milliGRAM(s) Oral daily  dipyridamole 200 mG/aspirin 25 mG 1Capsule(s) Oral two times a day  ascorbic acid 500milliGRAM(s) Oral daily  docusate sodium 100milliGRAM(s) Oral two times a day  polyethylene glycol 3350 17Gram(s) Oral daily    MEDICATIONS  (PRN):  dextrose Gel 1Dose(s) Oral once PRN Blood Glucose LESS THAN 70 milliGRAM(s)/deciliter  glucagon  Injectable 1milliGRAM(s) IntraMuscular once PRN Glucose LESS THAN 70 milligrams/deciliter  acetaminophen   Tablet. 650milliGRAM(s) Oral every 6 hours PRN Mild Pain (1 - 3)  senna 2Tablet(s) Oral at bedtime PRN Constipation      Allergies    No Known Allergies    Intolerances        PHYSICAL EXAM:  Vital Signs Last 24 Hrs  T(F): 98.9  HR: 65  BP: 119/99  RR: 16  Wt(kg): --    GENERAL: NAD, well-groomed, well-developed  HEAD:  Atraumatic, Normocephalic  Neuro:  Awake, alert, no aphasia  CN: PERRL, EOMI, no nystagmus, no facial weakness, tongue protrudes in the midline  motor: normal tone, no pronator drift, full strength in all four extremities other than mild weakness of right biceps  sensory: intact to light touch  coordination: finger to nose intact bilaterally  DTRs: symmetric,   LABS:                        11.5   6.7   )-----------( 280      ( 29 May 2017 06:19 )             37.0     05-29    138  |  101  |  14.0  ----------------------------<  219<H>  3.8   |  22.0  |  0.61    Ca    8.8      29 May 2017 06:19  Phos  3.9     05-29  Mg     2.0     05-29    TPro  7.1  /  Alb  3.9  /  TBili  0.1<L>  /  DBili  x   /  AST  13  /  ALT  13  /  AlkPhos  60  05-29          RADIOLOGY & ADDITIONAL STUDIES:  MRI brain:acute/subacute moderate sized left psoterior temporal lobe and left occipital lobe infarct  MRA neck: occluded left ICA vs long segment high grade stenosis  MRA Brain: occluded L ICA and L MCA

## 2017-05-29 NOTE — CHART NOTE - NSCHARTNOTEFT_GEN_A_CORE
PGY1 Note    Extended conversation with patient about her risky behavior and that if she continues with uncontrolled DM, HTN, HLD, and drug abuse, she will end up exactly where she is going, more and worsening strokes and eventually an early death. She gave me permission to talk with her sister.     Talked with sister, Melissa Quinones, in [place]. We discussed her present situation, an evolving stroke, uncontrolled HTN, DM, HLD, multidrug abuse, and that all of these will exacerbate her chances of having another stroke. Ms Palomares has children and grandchildren in [place]. Ms Quinones says she is willing to pay for her to come out to [place].     Will discuss rehab further with her in the AM.    Dr. Harrison PGY1 Note    Extended conversation with patient about her risky behavior and that if she continues with uncontrolled DM, HTN, HLD, and drug abuse, she will end up exactly where she is going, more and worsening strokes and eventually an early death. She gave me permission to talk with her sister.     Talked with sister, Melissa Quinones, in [place]. We discussed her present situation, an evolving stroke, uncontrolled HTN, DM, HLD, multidrug abuse, and that all of these will exacerbate her chances of having another stroke. Ms Palomares has children and grandchildren in [place]. Ms Quinones says she is willing to pay for her to come out to [place]. Melissa confirmed that in fact Ms Palomares's daughter was murdered by her daughter's .    Will discuss rehab further with her in the AM.    Dr. Harrison

## 2017-05-29 NOTE — PROGRESS NOTE ADULT - SUBJECTIVE AND OBJECTIVE BOX
Patient is a 47y old  Female who presents with a chief complaint of dysphagia and weakness (28 May 2017 21:23)      INTERVAL HPI/OVERNIGHT EVENTS:  47y  Female    PMNR Consult Noted: C/W PT/OT, Speech Eval. Pt declined therapy in April.  PT Consult Noted: Pt became agitated limiting eval. Disposition pending further fxnl eval. She has a home w/stairs.    ANTIMICROBIAL:    CARDIOVASCULAR:    PULMONARY:    NEUROLOGIC:  acetaminophen   Tablet. 650milliGRAM(s) Oral every 6 hours PRN    ONCOLOGIC:    HEMATOLOGIC:  dipyridamole 200 mG/aspirin 25 mG 1Capsule(s) Oral two times a day    GATROINTESTINAL:  pantoprazole    Tablet 40milliGRAM(s) Oral before breakfast     MEDS:    ENDO/METABOLIC:  insulin glargine Injectable (LANTUS) 15Unit(s) SubCutaneous at bedtime  insulin lispro (HumaLOG) corrective regimen sliding scale  SubCutaneous three times a day before meals  dextrose Gel 1Dose(s) Oral once PRN  dextrose 50% Injectable 12.5Gram(s) IV Push once  dextrose 50% Injectable 25Gram(s) IV Push once  glucagon  Injectable 1milliGRAM(s) IntraMuscular once PRN  atorvastatin 40milliGRAM(s) Oral at bedtime  insulin lispro (HumaLOG) corrective regimen sliding scale  SubCutaneous at bedtime    IV FLUID/NUTRITION:  ferrous    sulfate 325milliGRAM(s) Oral daily  ascorbic acid 500milliGRAM(s) Oral daily    TOPICAL:    IMMUNOLOGIC & OTHER      Allergies    No Known Allergies    Intolerances        Vital Signs Last 24 Hrs  T(C): 36.6, Max: 37 ( @ 07:00)  T(F): 97.9, Max: 98.6 ( @ 07:00)  HR: 67 (67 - 83)  BP: 146/86 (120/67 - 153/83)  BP(mean): 105 (84 - 105)  RR: 13 (12 - 20)  SpO2: 99% (98% - 100%)      Daily     Daily Weight in k (28 May 2017 09:45)  I&O's Detail  I & Os for 24h ending 28 May 2017 07:00  =============================================  IN:    Total IN: 0 ml  ---------------------------------------------  OUT:    Voided: 200 ml    Total OUT: 200 ml  ---------------------------------------------  Total NET: -200 ml    I & Os for current day (as of 29 May 2017 06:11)  =============================================  IN:    Oral Fluid: 1000 ml    Total IN: 1000 ml  ---------------------------------------------  OUT:    Total OUT: 0 ml  ---------------------------------------------  Total NET: 1000 ml        REVIEW OF SYSTEMS:    CONSTITUTIONAL: No fever, weight loss, or fatigue  EYES: No eye pain, visual disturbances, or discharge  ENMT:  No difficulty hearing, tinnitus, vertigo; No sinus or throat pain  NECK: No pain or stiffness  BREASTS: No pain, masses, or nipple discharge  RESPIRATORY: No cough, wheezing, chills or hemoptysis; No shortness of breath  CARDIOVASCULAR: No chest pain, palpitations, dizziness, or leg swelling  GASTROINTESTINAL: No abdominal or epigastric pain. No nausea, vomiting, or hematemesis; No diarrhea or constipation. No melena or hematochezia.  GENITOURINARY: No dysuria, frequency, hematuria, or incontinence  NEUROLOGICAL: No headaches, memory loss, loss of strength, numbness, or tremors  SKIN: No itching, burning, rashes, or lesions   LYMPH NODES: No enlarged glands  ENDOCRINE: No heat or cold intolerance; No hair loss  MUSCULOSKELETAL: No joint pain or swelling; No muscle, back, or extremity pain  PSYCHIATRIC: No depression, anxiety, mood swings, or difficulty sleeping  HEME/LYMPH: No easy bruising, or bleeding gums  ALLERY AND IMMUNOLOGIC: No hives or eczema      PHYSICAL EXAM:    GENERAL: NAD, well-groomed, well-developed  HEAD:  Atraumatic, Normocephalic  EYES: EOMI, PERRLA, conjunctiva and sclera clear  ENMT: No tonsillar erythema, exudates, or enlargement; Moist mucous membranes, Good dentition, No lesions  NECK: Supple, No JVD, Normal thyroid  NERVOUS SYSTEM:  Alert & Oriented X3, Good concentration; Motor Strength 5/5 B/L upper and lower extremities; DTRs 2+ intact and symmetric  CHEST/LUNG: Clear to auscultation bilaterally; No rales, rhonchi, wheezing, or rubs  HEART: Regular rate; No murmurs, rubs, or gallops  ABDOMEN: Soft, Nontender, Nondistended; Bowel sounds present  EXTREMITIES:  2+ Peripheral Pulses, No clubbing, cyanosis, or edema  LYMPH: No lymphadenopathy noted  RECTAL: No masses, prostate normal size and smooth, Guiac negative   BREAST: No palpatble masses, skin no lesions no retractions, no discharages. adenexa no palpable masses noted   GYN: uterus normal size, adenexa no palpable masses, no CMT, no uterine discharge   SKIN: No rashes or lesions      LABS:                        10.4   6.2   )-----------( 293      ( 27 May 2017 07:29 )             34.3     CBC Full  -  ( 27 May 2017 07:29 )  WBC Count : 6.2 K/uL  Hemoglobin : 10.4 g/dL  Hematocrit : 34.3 %  Platelet Count - Automated : 293 K/uL  Mean Cell Volume : 80.5 fl  Mean Cell Hemoglobin : 24.4 pg  Mean Cell Hemoglobin Concentration : 30.3 g/dL  Auto Neutrophil # : 3.1 K/uL  Auto Lymphocyte # : 2.5 K/uL  Auto Monocyte # : 0.4 K/uL  Auto Eosinophil # : 0.2 K/uL  Auto Basophil # : 0.0 K/uL  Auto Neutrophil % : 50.4 %  Auto Lymphocyte % : 40.5 %  Auto Monocyte % : 5.7 %  Auto Eosinophil % : 2.9 %  Auto Basophil % : 0.3 %        137  |  98  |  11.0  ----------------------------<  254<H>  3.8   |  26.0  |  0.57    Ca    8.8      27 May 2017 07:29          Hemoglobin A1C, Whole Blood: 10.4 % ( @ 11:37)                RADIOLOGY & ADDITIONAL TESTS: Patient is a 47y old  Female who presents with a chief complaint of dysphagia and weakness (28 May 2017 21:23)      INTERVAL HPI/OVERNIGHT EVENTS:  47y  Female seen at bedside A&O x 1. Her H/A is improved and 3/10 controlled with tylenol. She was agitated with PT yesteday and they were unable to finish their eval. Speech eval ordered.    PMNR Consult Noted: C/W PT/OT, Speech Eval. Pt declined therapy in April.  PT Consult Noted: Pt became agitated limiting eval. Disposition pending further fxnl eval. She has a home w/stairs.    ANTIMICROBIAL:    CARDIOVASCULAR:    PULMONARY:    NEUROLOGIC:  acetaminophen   Tablet. 650milliGRAM(s) Oral every 6 hours PRN    ONCOLOGIC:    HEMATOLOGIC:  dipyridamole 200 mG/aspirin 25 mG 1Capsule(s) Oral two times a day    GATROINTESTINAL:  pantoprazole    Tablet 40milliGRAM(s) Oral before breakfast     MEDS:    ENDO/METABOLIC:  insulin glargine Injectable (LANTUS) 15Unit(s) SubCutaneous at bedtime  insulin lispro (HumaLOG) corrective regimen sliding scale  SubCutaneous three times a day before meals  dextrose Gel 1Dose(s) Oral once PRN  dextrose 50% Injectable 12.5Gram(s) IV Push once  dextrose 50% Injectable 25Gram(s) IV Push once  glucagon  Injectable 1milliGRAM(s) IntraMuscular once PRN  atorvastatin 40milliGRAM(s) Oral at bedtime  insulin lispro (HumaLOG) corrective regimen sliding scale  SubCutaneous at bedtime    IV FLUID/NUTRITION:  ferrous    sulfate 325milliGRAM(s) Oral daily  ascorbic acid 500milliGRAM(s) Oral daily    TOPICAL:    IMMUNOLOGIC & OTHER      Allergies    No Known Allergies    Intolerances        Vital Signs Last 24 Hrs  T(C): 36.6, Max: 37 (- @ 07:00)  T(F): 97.9, Max: 98.6 (- @ 07:00)  HR: 67 (67 - 83)  BP: 146/86 (120/67 - 153/83)  BP(mean): 105 (84 - 105)  RR: 13 (12 - 20)  SpO2: 99% (98% - 100%)      Daily     Daily Weight in k (28 May 2017 09:45)  I&O's Detail  I & Os for 24h ending 28 May 2017 07:00  =============================================  IN:    Total IN: 0 ml  ---------------------------------------------  OUT:    Voided: 200 ml    Total OUT: 200 ml  ---------------------------------------------  Total NET: -200 ml    I & Os for current day (as of 29 May 2017 06:11)  =============================================  IN:    Oral Fluid: 1000 ml    Total IN: 1000 ml  ---------------------------------------------  OUT:    Total OUT: 0 ml  ---------------------------------------------  Total NET: 1000 ml        REVIEW OF SYSTEMS:    CONSTITUTIONAL: No fever, weight loss, or fatigue  EYES: No eye pain, visual disturbances, or discharge  ENMT:  No difficulty hearing, tinnitus, vertigo; No sinus or throat pain  NECK: No pain or stiffness  BREASTS: No pain, masses, or nipple discharge  RESPIRATORY: No cough, wheezing, chills or hemoptysis; No shortness of breath  CARDIOVASCULAR: No chest pain, palpitations, dizziness, or leg swelling  GASTROINTESTINAL: No abdominal or epigastric pain. No nausea, vomiting, or hematemesis; No diarrhea or constipation. No melena or hematochezia.  GENITOURINARY: No dysuria, frequency, hematuria, or incontinence  NEUROLOGICAL: No headaches, memory loss, loss of strength, numbness, or tremors  SKIN: No itching, burning, rashes, or lesions   LYMPH NODES: No enlarged glands  ENDOCRINE: No heat or cold intolerance; No hair loss  MUSCULOSKELETAL: No joint pain or swelling; No muscle, back, or extremity pain  PSYCHIATRIC: No depression, anxiety, mood swings, or difficulty sleeping  HEME/LYMPH: No easy bruising, or bleeding gums  ALLERY AND IMMUNOLOGIC: No hives or eczema      PHYSICAL EXAM:    GENERAL: NAD, well-groomed, well-developed  HEAD:  Atraumatic, Normocephalic  EYES: EOMI, PERRLA, conjunctiva and sclera clear  ENMT: No tonsillar erythema, exudates, or enlargement; Moist mucous membranes, Good dentition, No lesions  NECK: Supple, No JVD, Normal thyroid  NERVOUS SYSTEM:  Alert & Oriented X3, Good concentration; Motor Strength 5/5 B/L upper and lower extremities; DTRs 2+ intact and symmetric  CHEST/LUNG: Clear to auscultation bilaterally; No rales, rhonchi, wheezing, or rubs  HEART: Regular rate; No murmurs, rubs, or gallops  ABDOMEN: Soft, Nontender, Nondistended; Bowel sounds present  EXTREMITIES:  2+ Peripheral Pulses, No clubbing, cyanosis, or edema  LYMPH: No lymphadenopathy noted  RECTAL: No masses, prostate normal size and smooth, Guiac negative   BREAST: No palpatble masses, skin no lesions no retractions, no discharages. adenexa no palpable masses noted   GYN: uterus normal size, adenexa no palpable masses, no CMT, no uterine discharge   SKIN: No rashes or lesions      LABS:                        10.4   6.2   )-----------( 293      ( 27 May 2017 07:29 )             34.3     CBC Full  -  ( 27 May 2017 07:29 )  WBC Count : 6.2 K/uL  Hemoglobin : 10.4 g/dL  Hematocrit : 34.3 %  Platelet Count - Automated : 293 K/uL  Mean Cell Volume : 80.5 fl  Mean Cell Hemoglobin : 24.4 pg  Mean Cell Hemoglobin Concentration : 30.3 g/dL  Auto Neutrophil # : 3.1 K/uL  Auto Lymphocyte # : 2.5 K/uL  Auto Monocyte # : 0.4 K/uL  Auto Eosinophil # : 0.2 K/uL  Auto Basophil # : 0.0 K/uL  Auto Neutrophil % : 50.4 %  Auto Lymphocyte % : 40.5 %  Auto Monocyte % : 5.7 %  Auto Eosinophil % : 2.9 %  Auto Basophil % : 0.3 %        137  |  98  |  11.0  ----------------------------<  254<H>  3.8   |  26.0  |  0.57    Ca    8.8      27 May 2017 07:29          Hemoglobin A1C, Whole Blood: 10.4 % ( @ 11:37)                RADIOLOGY & ADDITIONAL TESTS: Patient is a 47y old  Female who presents with a chief complaint of dysphagia and weakness (28 May 2017 21:23)      INTERVAL HPI/OVERNIGHT EVENTS:  47y  Female seen at bedside A&O x 1. Her H/A is improved and 3/10 controlled with tylenol. She was agitated with PT yesteday and they were unable to finish their eval. Speech eval ordered.    PMNR Consult Noted: C/W PT/OT, Speech Eval. Pt declined therapy in April.  PT Consult Noted: Pt became agitated limiting eval. Disposition pending further fxnl eval. She has a home w/stairs.    ANTIMICROBIAL:    CARDIOVASCULAR:    PULMONARY:    NEUROLOGIC:  acetaminophen   Tablet. 650milliGRAM(s) Oral every 6 hours PRN    ONCOLOGIC:    HEMATOLOGIC:  dipyridamole 200 mG/aspirin 25 mG 1Capsule(s) Oral two times a day    GATROINTESTINAL:  pantoprazole    Tablet 40milliGRAM(s) Oral before breakfast     MEDS:    ENDO/METABOLIC:  insulin glargine Injectable (LANTUS) 15Unit(s) SubCutaneous at bedtime  insulin lispro (HumaLOG) corrective regimen sliding scale  SubCutaneous three times a day before meals  dextrose Gel 1Dose(s) Oral once PRN  dextrose 50% Injectable 12.5Gram(s) IV Push once  dextrose 50% Injectable 25Gram(s) IV Push once  glucagon  Injectable 1milliGRAM(s) IntraMuscular once PRN  atorvastatin 40milliGRAM(s) Oral at bedtime  insulin lispro (HumaLOG) corrective regimen sliding scale  SubCutaneous at bedtime    IV FLUID/NUTRITION:  ferrous    sulfate 325milliGRAM(s) Oral daily  ascorbic acid 500milliGRAM(s) Oral daily    TOPICAL:    IMMUNOLOGIC & OTHER      Allergies    No Known Allergies    Intolerances        Vital Signs Last 24 Hrs  T(C): 36.6, Max: 37 (- @ 07:00)  T(F): 97.9, Max: 98.6 (- @ 07:00)  HR: 67 (67 - 83)  BP: 146/86 (120/67 - 153/83)  BP(mean): 105 (84 - 105)  RR: 13 (12 - 20)  SpO2: 99% (98% - 100%)      Daily     Daily Weight in k (28 May 2017 09:45)  I&O's Detail  I & Os for 24h ending 28 May 2017 07:00  =============================================  IN:    Total IN: 0 ml  ---------------------------------------------  OUT:    Voided: 200 ml    Total OUT: 200 ml  ---------------------------------------------  Total NET: -200 ml    I & Os for current day (as of 29 May 2017 06:11)  =============================================  IN:    Oral Fluid: 1000 ml    Total IN: 1000 ml  ---------------------------------------------  OUT:    Total OUT: 0 ml  ---------------------------------------------  Total NET: 1000 ml        REVIEW OF SYSTEMS:    CONSTITUTIONAL: No fever, weight loss, or fatigue  EYES: No eye pain, visual disturbances, or discharge  ENMT:  No difficulty hearing, tinnitus, vertigo; No sinus or throat pain  NECK: No pain or stiffness  RESPIRATORY: No cough, wheezing, chills or hemoptysis; No shortness of breath  CARDIOVASCULAR: No chest pain, palpitations, dizziness, or leg swelling  GASTROINTESTINAL: No abdominal or epigastric pain. No nausea, vomiting, or hematemesis; No diarrhea or constipation. No melena or hematochezia.  GENITOURINARY: No dysuria, frequency, hematuria, or incontinence  NEUROLOGICAL: Headaches, No loss of strength, numbness, or tremors  SKIN: No itching, burning, rashes, or lesions   LYMPH NODES: No enlarged glands  ENDOCRINE: No heat or cold intolerance; No hair loss  MUSCULOSKELETAL: No joint pain or swelling; No muscle, back, or extremity pain      PHYSICAL EXAM:    GENERAL: NAD, well-groomed, well-developed  HEAD:  Atraumatic, Normocephalic  EYES: EOMI, PERRLA, conjunctiva and sclera clear  ENMT: No tonsillar erythema, exudates, or enlargement; Moist mucous membranes, Good dentition, No lesions  NECK: Supple, No JVD, Normal thyroid  NERVOUS SYSTEM:  Alert & Oriented X3, Good concentration; Motor Strength 5/5 B/L upper and lower extremities; DTRs 2+ intact and symmetric  CHEST/LUNG: Clear to auscultation bilaterally; No rales, rhonchi, wheezing, or rubs  HEART: Regular rate; No murmurs, rubs, or gallops  ABDOMEN: Soft, Nontender, Nondistended; Bowel sounds present  EXTREMITIES:  2+ Peripheral Pulses, No clubbing, cyanosis, or edema  LYMPH: No lymphadenopathy noted  SKIN: No rashes or lesions      LABS:                        10.4   6.2   )-----------( 293      ( 27 May 2017 07:29 )             34.3     CBC Full  -  ( 27 May 2017 07:29 )  WBC Count : 6.2 K/uL  Hemoglobin : 10.4 g/dL  Hematocrit : 34.3 %  Platelet Count - Automated : 293 K/uL  Mean Cell Volume : 80.5 fl  Mean Cell Hemoglobin : 24.4 pg  Mean Cell Hemoglobin Concentration : 30.3 g/dL  Auto Neutrophil # : 3.1 K/uL  Auto Lymphocyte # : 2.5 K/uL  Auto Monocyte # : 0.4 K/uL  Auto Eosinophil # : 0.2 K/uL  Auto Basophil # : 0.0 K/uL  Auto Neutrophil % : 50.4 %  Auto Lymphocyte % : 40.5 %  Auto Monocyte % : 5.7 %  Auto Eosinophil % : 2.9 %  Auto Basophil % : 0.3 %        137  |  98  |  11.0  ----------------------------<  254<H>  3.8   |  26.0  |  0.57    Ca    8.8      27 May 2017 07:29          Hemoglobin A1C, Whole Blood: 10.4 % ( @ 11:37)                RADIOLOGY & ADDITIONAL TESTS: Patient is a 47y old  Female who presents with a chief complaint of dysphagia and weakness (28 May 2017 21:23)      INTERVAL HPI/OVERNIGHT EVENTS:  47y  Female seen at bedside A&O x 1. Her H/A is improved and 3/10 controlled with tylenol. She was agitated with PT yesteday and they were unable to finish their eval. Speech eval ordered.    PMNR Consult Noted: C/W PT/OT, Speech Eval. Pt declined therapy in April.  PT Consult Noted: Pt became agitated limiting eval. Disposition pending further fxnl eval. She has a home w/stairs.    ANTIMICROBIAL:    CARDIOVASCULAR:    PULMONARY:    NEUROLOGIC:  acetaminophen   Tablet. 650milliGRAM(s) Oral every 6 hours PRN    ONCOLOGIC:    HEMATOLOGIC:  dipyridamole 200 mG/aspirin 25 mG 1Capsule(s) Oral two times a day    GATROINTESTINAL:  pantoprazole    Tablet 40milliGRAM(s) Oral before breakfast     MEDS:    ENDO/METABOLIC:  insulin glargine Injectable (LANTUS) 15Unit(s) SubCutaneous at bedtime  insulin lispro (HumaLOG) corrective regimen sliding scale  SubCutaneous three times a day before meals  dextrose Gel 1Dose(s) Oral once PRN  dextrose 50% Injectable 12.5Gram(s) IV Push once  dextrose 50% Injectable 25Gram(s) IV Push once  glucagon  Injectable 1milliGRAM(s) IntraMuscular once PRN  atorvastatin 40milliGRAM(s) Oral at bedtime  insulin lispro (HumaLOG) corrective regimen sliding scale  SubCutaneous at bedtime    IV FLUID/NUTRITION:  ferrous    sulfate 325milliGRAM(s) Oral daily  ascorbic acid 500milliGRAM(s) Oral daily    TOPICAL:    IMMUNOLOGIC & OTHER      Allergies    No Known Allergies    Intolerances        Vital Signs Last 24 Hrs  T(C): 36.6, Max: 37 (- @ 07:00)  T(F): 97.9, Max: 98.6 (- @ 07:00)  HR: 67 (67 - 83)  BP: 146/86 (120/67 - 153/83)  BP(mean): 105 (84 - 105)  RR: 13 (12 - 20)  SpO2: 99% (98% - 100%)      Daily     Daily Weight in k (28 May 2017 09:45)  I&O's Detail  I & Os for 24h ending 28 May 2017 07:00  =============================================  IN:    Total IN: 0 ml  ---------------------------------------------  OUT:    Voided: 200 ml    Total OUT: 200 ml  ---------------------------------------------  Total NET: -200 ml    I & Os for current day (as of 29 May 2017 06:11)  =============================================  IN:    Oral Fluid: 1000 ml    Total IN: 1000 ml  ---------------------------------------------  OUT:    Total OUT: 0 ml  ---------------------------------------------  Total NET: 1000 ml        REVIEW OF SYSTEMS:    CONSTITUTIONAL: No fever, weight loss, or fatigue  EYES: No eye pain, visual disturbances, or discharge  ENMT:  No difficulty hearing, tinnitus, vertigo; No sinus or throat pain  NECK: No pain or stiffness  RESPIRATORY: No cough, wheezing, chills or hemoptysis; No shortness of breath  CARDIOVASCULAR: No chest pain, palpitations, dizziness, or leg swelling  GASTROINTESTINAL: No abdominal or epigastric pain. No nausea, vomiting, or hematemesis; No diarrhea or constipation. No melena or hematochezia.  GENITOURINARY: No dysuria, frequency, hematuria, or incontinence  NEUROLOGICAL: Headaches, No loss of strength, numbness, or tremors  SKIN: No itching, burning, rashes, or lesions   LYMPH NODES: No enlarged glands  ENDOCRINE: No heat or cold intolerance; No hair loss  MUSCULOSKELETAL: No joint pain or swelling; No muscle, back, or extremity pain      PHYSICAL EXAM:    GENERAL: NAD, well-groomed, well-developed  HEAD:  Atraumatic, Normocephalic  EYES: EOMI, PERRLA, conjunctiva and sclera clear  ENMT: No tonsillar erythema, exudates, or enlargement; Moist mucous membranes, Good dentition, No lesions  NECK: Supple, No JVD, Normal thyroid  NERVOUS SYSTEM:  Alert & Oriented X1 person; Motor Strength 5/5 B/L upper and lower extremities; DTRs 2+ intact and symmetric  CHEST/LUNG: Clear to auscultation bilaterally; No rales, rhonchi, wheezing, or rubs  HEART: Regular rate; No murmurs, rubs, or gallops  ABDOMEN: Soft, Nontender, Nondistended; Bowel sounds present  EXTREMITIES:  2+ Peripheral Pulses, No clubbing, cyanosis, or edema  LYMPH: No lymphadenopathy noted  SKIN: No rashes or lesions      LABS:                        10.4   6.2   )-----------( 293      ( 27 May 2017 07:29 )             34.3     CBC Full  -  ( 27 May 2017 07:29 )  WBC Count : 6.2 K/uL  Hemoglobin : 10.4 g/dL  Hematocrit : 34.3 %  Platelet Count - Automated : 293 K/uL  Mean Cell Volume : 80.5 fl  Mean Cell Hemoglobin : 24.4 pg  Mean Cell Hemoglobin Concentration : 30.3 g/dL  Auto Neutrophil # : 3.1 K/uL  Auto Lymphocyte # : 2.5 K/uL  Auto Monocyte # : 0.4 K/uL  Auto Eosinophil # : 0.2 K/uL  Auto Basophil # : 0.0 K/uL  Auto Neutrophil % : 50.4 %  Auto Lymphocyte % : 40.5 %  Auto Monocyte % : 5.7 %  Auto Eosinophil % : 2.9 %  Auto Basophil % : 0.3 %        137  |  98  |  11.0  ----------------------------<  254<H>  3.8   |  26.0  |  0.57    Ca    8.8      27 May 2017 07:29          Hemoglobin A1C, Whole Blood: 10.4 % ( @ 11:37)  CAPILLARY BLOOD GLUCOSE  254 (29 May 2017 08:00)  170 (28 May 2017 21:24)  149 (28 May 2017 16:00)  179 (28 May 2017 12:00)                RADIOLOGY & ADDITIONAL TESTS:

## 2017-05-29 NOTE — PROGRESS NOTE ADULT - PROBLEM SELECTOR PLAN 6
Patient reports hx but not on meds.   can resume bp meds now   IV tylenol for headache   will montior vitals closely

## 2017-05-29 NOTE — PROGRESS NOTE ADULT - ASSESSMENT
47 year old woman with multiple medical problems including uncontrolled DM, history of cocaine use She had a recent stroke with occlusion of the left ICA and left MCA. Admitted with question fo worsening aphasia.

## 2017-05-29 NOTE — ED BEHAVIORAL HEALTH NOTE - BEHAVIORAL HEALTH NOTE
Patient is a 47 year-old  female, with no past psychiatric diagnosis, presented to ED complaining worsening headache and worsening aphasia after leaving AMA s/p CVA a few weeks ago. Patient was medically admitted 5/26/2017 with concern for cardioembolic source for her worsening expressive aphasia, which did not show positive findings, however as per MRI findings indicated occlusion of the left ICA and left MCA. CVA risk factors are noted to be her medical issues: uncontrolled diabetes mellitus, obesity, hypertension, hyperlipidemia and cocaine abuse. Consult was called to R/O psychiatric symptoms for patients aphasia and altered mental status.    Patient was approached, while she was in bed with lights off, and she was minimally cooperative, irritable and later hostile, refusing to talk to writer (psychiatric service), stating "wanting sleep," later ignoring writer and putting sheet over her head. Patient was able to answer a few questions between introduction and (her) silence, denying prior psychiatric history, denying suicidality, denying homicidality, denying psychotic symptoms including auditory hallucination visual hallucinations paranoia. Patient irritability and hostility does not need intervention: patient is calm when uninterrupted. Patient denies weakness or lateralization of weakness: seen ambulating back and forth from bathroom, holding her head. Patient was fairly related, able to answer questions directly, speaking in full sentences, however her irritability and hostility, made it difficult to assess her thought process, but patient did not appear delirious. Patient was somatic preoccupied, complaining of "severe headache," of which blood pressure was 183 / 98 (taken while present). Patient reported "she was cleared by the doctored" and felt "ready to go home," of which the plan is to discharge patient. Patient does not present with acute psychiatric symptoms that require intervention at this time. Morning psychiatric team can follow-up with patient and assess need for psychiatric intervention: case discussed with Dr. Osman ( Attending). The medical team, Dr. Joni Boles (resident) and Dr. Espinal notified of plan at this time.

## 2017-05-30 VITALS
OXYGEN SATURATION: 99 % | DIASTOLIC BLOOD PRESSURE: 76 MMHG | HEART RATE: 66 BPM | RESPIRATION RATE: 18 BRPM | SYSTOLIC BLOOD PRESSURE: 120 MMHG | TEMPERATURE: 98 F

## 2017-05-30 DIAGNOSIS — F43.20 ADJUSTMENT DISORDER, UNSPECIFIED: ICD-10-CM

## 2017-05-30 DIAGNOSIS — F10.20 ALCOHOL DEPENDENCE, UNCOMPLICATED: ICD-10-CM

## 2017-05-30 LAB
AUTO DIFF PNL BLD: NEGATIVE — SIGNIFICANT CHANGE UP
C-ANCA SER-ACNC: NEGATIVE — SIGNIFICANT CHANGE UP
DRVVT SCREEN TO CONFIRM RATIO: SIGNIFICANT CHANGE UP
LA NT DPL PPP QL: 24.1 SEC — SIGNIFICANT CHANGE UP
NORMALIZED SCT PPP-RTO: 0.97 RATIO — SIGNIFICANT CHANGE UP (ref 0–1.16)
NORMALIZED SCT PPP-RTO: SIGNIFICANT CHANGE UP
P-ANCA SER-ACNC: NEGATIVE — SIGNIFICANT CHANGE UP
PROT C ACT/NOR PPP: 115 % — SIGNIFICANT CHANGE UP (ref 74–150)
PROT S FREE AG PPP IA-ACNC: 103 % — SIGNIFICANT CHANGE UP (ref 61–131)

## 2017-05-30 PROCEDURE — 85027 COMPLETE CBC AUTOMATED: CPT

## 2017-05-30 PROCEDURE — 85610 PROTHROMBIN TIME: CPT

## 2017-05-30 PROCEDURE — 96375 TX/PRO/DX INJ NEW DRUG ADDON: CPT

## 2017-05-30 PROCEDURE — 96374 THER/PROPH/DIAG INJ IV PUSH: CPT

## 2017-05-30 PROCEDURE — 80048 BASIC METABOLIC PNL TOTAL CA: CPT

## 2017-05-30 PROCEDURE — 80061 LIPID PANEL: CPT

## 2017-05-30 PROCEDURE — 86780 TREPONEMA PALLIDUM: CPT

## 2017-05-30 PROCEDURE — 86038 ANTINUCLEAR ANTIBODIES: CPT

## 2017-05-30 PROCEDURE — 71045 X-RAY EXAM CHEST 1 VIEW: CPT

## 2017-05-30 PROCEDURE — 80307 DRUG TEST PRSMV CHEM ANLYZR: CPT

## 2017-05-30 PROCEDURE — 85730 THROMBOPLASTIN TIME PARTIAL: CPT

## 2017-05-30 PROCEDURE — 99285 EMERGENCY DEPT VISIT HI MDM: CPT | Mod: 25

## 2017-05-30 PROCEDURE — 70544 MR ANGIOGRAPHY HEAD W/O DYE: CPT

## 2017-05-30 PROCEDURE — 85652 RBC SED RATE AUTOMATED: CPT

## 2017-05-30 PROCEDURE — 36415 COLL VENOUS BLD VENIPUNCTURE: CPT

## 2017-05-30 PROCEDURE — 99232 SBSQ HOSP IP/OBS MODERATE 35: CPT

## 2017-05-30 PROCEDURE — 84702 CHORIONIC GONADOTROPIN TEST: CPT

## 2017-05-30 PROCEDURE — 86140 C-REACTIVE PROTEIN: CPT

## 2017-05-30 PROCEDURE — 83090 ASSAY OF HOMOCYSTEINE: CPT

## 2017-05-30 PROCEDURE — 84100 ASSAY OF PHOSPHORUS: CPT

## 2017-05-30 PROCEDURE — 83735 ASSAY OF MAGNESIUM: CPT

## 2017-05-30 PROCEDURE — 80053 COMPREHEN METABOLIC PANEL: CPT

## 2017-05-30 PROCEDURE — 86147 CARDIOLIPIN ANTIBODY EA IG: CPT

## 2017-05-30 PROCEDURE — 81241 F5 GENE: CPT

## 2017-05-30 PROCEDURE — 70547 MR ANGIOGRAPHY NECK W/O DYE: CPT

## 2017-05-30 PROCEDURE — 93306 TTE W/DOPPLER COMPLETE: CPT

## 2017-05-30 PROCEDURE — 81240 F2 GENE: CPT

## 2017-05-30 PROCEDURE — 97530 THERAPEUTIC ACTIVITIES: CPT

## 2017-05-30 PROCEDURE — 97163 PT EVAL HIGH COMPLEX 45 MIN: CPT

## 2017-05-30 PROCEDURE — 86255 FLUORESCENT ANTIBODY SCREEN: CPT

## 2017-05-30 PROCEDURE — 86146 BETA-2 GLYCOPROTEIN ANTIBODY: CPT

## 2017-05-30 PROCEDURE — 93005 ELECTROCARDIOGRAM TRACING: CPT

## 2017-05-30 PROCEDURE — 99238 HOSP IP/OBS DSCHRG MGMT 30/<: CPT

## 2017-05-30 PROCEDURE — 84443 ASSAY THYROID STIM HORMONE: CPT

## 2017-05-30 PROCEDURE — 70551 MRI BRAIN STEM W/O DYE: CPT

## 2017-05-30 PROCEDURE — 86036 ANCA SCREEN EACH ANTIBODY: CPT

## 2017-05-30 PROCEDURE — 85306 CLOT INHIBIT PROT S FREE: CPT

## 2017-05-30 PROCEDURE — 97116 GAIT TRAINING THERAPY: CPT

## 2017-05-30 PROCEDURE — 70450 CT HEAD/BRAIN W/O DYE: CPT

## 2017-05-30 PROCEDURE — 85303 CLOT INHIBIT PROT C ACTIVITY: CPT

## 2017-05-30 RX ORDER — POLYETHYLENE GLYCOL 3350 17 G/17G
17 POWDER, FOR SOLUTION ORAL
Qty: 1 | Refills: 0 | OUTPATIENT
Start: 2017-05-30 | End: 2017-06-29

## 2017-05-30 RX ORDER — DOCUSATE SODIUM 100 MG
1 CAPSULE ORAL
Qty: 60 | Refills: 0 | OUTPATIENT
Start: 2017-05-30 | End: 2017-06-29

## 2017-05-30 RX ORDER — ASCORBIC ACID 60 MG
1 TABLET,CHEWABLE ORAL
Qty: 90 | Refills: 0 | OUTPATIENT
Start: 2017-05-30 | End: 2017-06-29

## 2017-05-30 RX ORDER — PANTOPRAZOLE SODIUM 20 MG/1
1 TABLET, DELAYED RELEASE ORAL
Qty: 30 | Refills: 0 | OUTPATIENT
Start: 2017-05-30 | End: 2017-06-29

## 2017-05-30 RX ORDER — ENOXAPARIN SODIUM 100 MG/ML
15 INJECTION SUBCUTANEOUS
Qty: 450 | Refills: 0 | OUTPATIENT
Start: 2017-05-30 | End: 2017-06-29

## 2017-05-30 RX ORDER — FERROUS SULFATE 325(65) MG
1 TABLET ORAL
Qty: 90 | Refills: 0 | OUTPATIENT
Start: 2017-05-30 | End: 2017-06-29

## 2017-05-30 RX ORDER — ACETAMINOPHEN 500 MG
2 TABLET ORAL
Qty: 240 | Refills: 0 | OUTPATIENT
Start: 2017-05-30 | End: 2017-06-29

## 2017-05-30 RX ORDER — ASPIRIN AND DIPYRIDAMOLE 25; 200 MG/1; MG/1
1 CAPSULE, EXTENDED RELEASE ORAL
Qty: 60 | Refills: 0 | OUTPATIENT
Start: 2017-05-30 | End: 2017-06-29

## 2017-05-30 RX ORDER — ATORVASTATIN CALCIUM 80 MG/1
1 TABLET, FILM COATED ORAL
Qty: 30 | Refills: 0 | OUTPATIENT
Start: 2017-05-30 | End: 2017-06-29

## 2017-05-30 RX ORDER — SENNA PLUS 8.6 MG/1
2 TABLET ORAL
Qty: 60 | Refills: 0 | OUTPATIENT
Start: 2017-05-30 | End: 2017-06-29

## 2017-05-30 RX ORDER — BACITRACIN ZINC 500 UNIT/G
1 OINTMENT IN PACKET (EA) TOPICAL
Qty: 1 | Refills: 0 | OUTPATIENT
Start: 2017-05-30 | End: 2017-06-29

## 2017-05-30 RX ORDER — METFORMIN HYDROCHLORIDE 850 MG/1
1 TABLET ORAL
Qty: 60 | Refills: 0 | OUTPATIENT
Start: 2017-05-30 | End: 2017-06-29

## 2017-05-30 RX ADMIN — Medication 100 MILLIGRAM(S): at 17:07

## 2017-05-30 RX ADMIN — ASPIRIN AND DIPYRIDAMOLE 1 CAPSULE(S): 25; 200 CAPSULE, EXTENDED RELEASE ORAL at 17:06

## 2017-05-30 RX ADMIN — Medication 650 MILLIGRAM(S): at 07:32

## 2017-05-30 RX ADMIN — Medication 325 MILLIGRAM(S): at 12:19

## 2017-05-30 RX ADMIN — Medication 2: at 17:06

## 2017-05-30 RX ADMIN — Medication 650 MILLIGRAM(S): at 12:21

## 2017-05-30 RX ADMIN — Medication 1 APPLICATION(S): at 17:06

## 2017-05-30 RX ADMIN — Medication 650 MILLIGRAM(S): at 06:33

## 2017-05-30 RX ADMIN — Medication 2: at 08:30

## 2017-05-30 RX ADMIN — Medication 650 MILLIGRAM(S): at 13:06

## 2017-05-30 RX ADMIN — Medication 500 MILLIGRAM(S): at 12:19

## 2017-05-30 RX ADMIN — Medication 1: at 12:19

## 2017-05-30 RX ADMIN — ASPIRIN AND DIPYRIDAMOLE 1 CAPSULE(S): 25; 200 CAPSULE, EXTENDED RELEASE ORAL at 06:33

## 2017-05-30 RX ADMIN — PANTOPRAZOLE SODIUM 40 MILLIGRAM(S): 20 TABLET, DELAYED RELEASE ORAL at 08:30

## 2017-05-30 RX ADMIN — Medication 1 APPLICATION(S): at 06:33

## 2017-05-30 NOTE — PROGRESS NOTE ADULT - SUBJECTIVE AND OBJECTIVE BOX
Patient in bed. Feels well.  Denies pain.  Walking on own to bathroom.  Worked with PT and is independent.  She is looking to go home.     REVIEW OF SYSTEMS  Constitutional - No fever,  +fatigue  HEENT - No vertigo, No neck pain  Neurological - No headaches, No memory loss, No loss of strength, No numbness, No tremors  Skin - No rashes, No lesions   Musculoskeletal - No joint pain, No joint swelling, No muscle pain  Psychiatric - +depression, No anxiety    VITALS  T(C): 36.5, Max: 37.2 (05-29 @ 15:20)  HR: 66 (66 - 77)  BP: 120/76 (120/76 - 183/98)  RR: 18 (18 - 19)  SpO2: 99% (99% - 99%)  Wt(kg): --    MEDICATIONS   insulin glargine Injectable (LANTUS) 15Unit(s) at bedtime  insulin lispro (HumaLOG) corrective regimen sliding scale  three times a day before meals  dextrose Gel 1Dose(s) once PRN  dextrose 50% Injectable 12.5Gram(s) once  dextrose 50% Injectable 25Gram(s) once  glucagon  Injectable 1milliGRAM(s) once PRN  atorvastatin 40milliGRAM(s) at bedtime  pantoprazole    Tablet 40milliGRAM(s) before breakfast  acetaminophen   Tablet. 650milliGRAM(s) every 6 hours PRN  insulin lispro (HumaLOG) corrective regimen sliding scale  at bedtime  ferrous    sulfate 325milliGRAM(s) daily  dipyridamole 200 mG/aspirin 25 mG 1Capsule(s) two times a day  ascorbic acid 500milliGRAM(s) daily  docusate sodium 100milliGRAM(s) two times a day  senna 2Tablet(s) at bedtime PRN  polyethylene glycol 3350 17Gram(s) daily PRN  BACItracin   Ointment 1Application(s) two times a day      RECENT LABS/IMAGING  CBC Full  -  ( 29 May 2017 06:19 )  WBC Count : 6.7 K/uL  Hemoglobin : 11.5 g/dL  Hematocrit : 37.0 %  Platelet Count - Automated : 280 K/uL  Mean Cell Volume : 80.1 fl  Mean Cell Hemoglobin : 24.9 pg  Mean Cell Hemoglobin Concentration : 31.1 g/dL  Auto Neutrophil # : 3.4 K/uL  Auto Lymphocyte # : 2.7 K/uL  Auto Monocyte # : 0.4 K/uL  Auto Eosinophil # : 0.2 K/uL  Auto Basophil # : 0.0 K/uL  Auto Neutrophil % : 51.3 %  Auto Lymphocyte % : 40.2 %  Auto Monocyte % : 5.7 %  Auto Eosinophil % : 2.4 %  Auto Basophil % : 0.3 %    05-29    138  |  101  |  14.0  ----------------------------<  219<H>  3.8   |  22.0  |  0.61    Ca    8.8      29 May 2017 06:19  Phos  3.9     05-29  Mg     2.0     05-29    TPro  7.1  /  Alb  3.9  /  TBili  0.1<L>  /  DBili  x   /  AST  13  /  ALT  13  /  AlkPhos  60  05-29        ---------  PHYSICAL EXAM  Constitutional - NAD, Comfortable  Extremities - No C/C/E, No calf tenderness  Neurologic Exam -                    Cognitive - AAOx3     Psychiatric - Mood WNL, Affect Flat    ASSESSMENT/PLAN  47y Female with functional deficits related to old CVA  Pain - Tylenol PRN  DVT PPX - Aggrenox  Rehab Dispo - Recommend DC HOME with outpatient vs. home care. Will sign off.

## 2017-05-30 NOTE — PROGRESS NOTE ADULT - PROBLEM SELECTOR PLAN 1
Age undetermined with dysphagia and facial droop not TPA candidate will do closely neuro monitoring as advised by ED and Neuro recc;'s  seen by neuro - changed asa to aggrenox. Rec hypercoag b/w   c/w lipitor since tele negative, can d/g from stroke unit and keep on any monitored bed Currently On stroke unit given headache, edema and potential for stroke to convert or worsen as it is now evolving - vascular surgery patito noted - will call neurosurgery pending MRA head and neck   change neurochecks to q4h  PT/OT consult for am will give OOB w assistance orders for ambulation orders  SW for etoh/cocaine cessation  repeat echo ordered, TOLU noted from last adm, cardio called - needed a loop recorder on prior admission but had wanted to do outpatient and not in.   PMNR patito noted - will follow her inpatient

## 2017-05-30 NOTE — PROGRESS NOTE ADULT - PROBLEM SELECTOR PLAN 5
a1c 10.4 ? compliance though states she is. c/w 254-199  Lantus 15 units q hs home dose continued 3 units breakfast, 1 lunch, 3 dinner yesterday  strict control of c/s to help with the medical management of her occluded Left ICA

## 2017-05-30 NOTE — PROGRESS NOTE ADULT - PROBLEM SELECTOR PLAN 8
VC boots  Lovenox on hold until case to be discussed with neurologist considering left temporal, parietal ,left occipital infarct with cytotoxic edema.

## 2017-05-30 NOTE — PROGRESS NOTE ADULT - SUBJECTIVE AND OBJECTIVE BOX
Patient is a 47y old  Female who presents with a chief complaint of dysphagia and weakness (28 May 2017 21:23)      INTERVAL HPI/OVERNIGHT EVENTS:  47y  Female seen at bedside. Pt is in no acute distress. We discussed possible rehab, and pt denies that she has a drug problem, "I don't have a problem. I only occasionally use drugs on the weekend." She does not remember conversation we had last night around 6pm. Pt complains that her head hurts 4/10 in the region above her L eye as usual. It is slightly worse than yesterday morning. No other complaints. She claims her last bowel movement was today. She denies N/V/D/C, CP, SOB, heart palpitations, dysuria or frequency, calf pain or edema.    Pt had an elevated BP, 183/98, but when manually checked it was WNL, 135/65.    ANTIMICROBIAL:    CARDIOVASCULAR:    PULMONARY:    NEUROLOGIC:  acetaminophen   Tablet. 650milliGRAM(s) Oral every 6 hours PRN    ONCOLOGIC:    HEMATOLOGIC:  dipyridamole 200 mG/aspirin 25 mG 1Capsule(s) Oral two times a day    GATROINTESTINAL:  pantoprazole    Tablet 40milliGRAM(s) Oral before breakfast  docusate sodium 100milliGRAM(s) Oral two times a day  senna 2Tablet(s) Oral at bedtime PRN  polyethylene glycol 3350 17Gram(s) Oral daily PRN     MEDS:    ENDO/METABOLIC:  insulin glargine Injectable (LANTUS) 15Unit(s) SubCutaneous at bedtime  insulin lispro (HumaLOG) corrective regimen sliding scale  SubCutaneous three times a day before meals  dextrose Gel 1Dose(s) Oral once PRN  dextrose 50% Injectable 12.5Gram(s) IV Push once  dextrose 50% Injectable 25Gram(s) IV Push once  glucagon  Injectable 1milliGRAM(s) IntraMuscular once PRN  atorvastatin 40milliGRAM(s) Oral at bedtime  insulin lispro (HumaLOG) corrective regimen sliding scale  SubCutaneous at bedtime    IV FLUID/NUTRITION:  ferrous    sulfate 325milliGRAM(s) Oral daily  ascorbic acid 500milliGRAM(s) Oral daily    TOPICAL:  BACItracin   Ointment 1Application(s) Topical two times a day    IMMUNOLOGIC & OTHER      Allergies    No Known Allergies    Intolerances        Vital Signs Last 24 Hrs  T(C): 36.7, Max: 37.2 (05-29 @ 12:14)  T(F): 98, Max: 99 ( @ 15:20)  HR: 70 (65 - 79)  BP: 135/65 (119/99 - 183/98)  BP(mean): --  RR: 19 (16 - 22)  SpO2: 99% (98% - 99%)      Daily     Daily Weight in k (30 May 2017 00:11)  I&O's Detail  I & Os for 24h ending 29 May 2017 07:00  =============================================  IN:    Oral Fluid: 1000 ml    Total IN: 1000 ml  ---------------------------------------------  OUT:    Total OUT: 0 ml  ---------------------------------------------  Total NET: 1000 ml    I & Os for current day (as of 30 May 2017 06:29)  =============================================  IN:    Oral Fluid: 840 ml    Total IN: 840 ml  ---------------------------------------------  OUT:    Total OUT: 0 ml  ---------------------------------------------  Total NET: 840 ml      REVIEW OF SYSTEMS:    CONSTITUTIONAL: No fever, weight loss, or fatigue  EYES: No eye pain, visual disturbances, or discharge  ENMT:  No difficulty hearing, tinnitus, vertigo; No sinus or throat pain  NECK: No pain or stiffness  RESPIRATORY: No cough, wheezing, chills or hemoptysis; No shortness of breath  CARDIOVASCULAR: No chest pain, palpitations, dizziness, or leg swelling  GASTROINTESTINAL: No abdominal or epigastric pain. No nausea, vomiting, or hematemesis; No diarrhea or constipation. No melena or hematochezia.  GENITOURINARY: No dysuria, frequency, hematuria, or incontinence  NEUROLOGICAL: Headached described above, memory loss. No loss of strength, numbness  SKIN: No itching, burning, rashes, or lesions       PHYSICAL EXAM:    GENERAL: NAD, white head covering  HEAD:  Atraumatic, Normocephalic  EYES: EOMI, PERRLA, conjunctiva and sclera clear  ENMT: No tonsillar erythema, exudates, or enlargement; Moist mucous membranes,   NECK: Supple, No JVD  NERVOUS SYSTEM:  Alert & Oriented X1 to person, Good concentration; Motor Strength 5/5 B/L upper and lower extremities; DTRs 2+ intact and symmetric  CHEST/LUNG: Clear to auscultation bilaterally; No rales, rhonchi, wheezing, or rubs  HEART: Regular rate; No murmurs, rubs, or gallops  ABDOMEN: Soft, Nontender, Nondistended; Bowel sounds present  EXTREMITIES:  2+ Peripheral Pulses, No clubbing, cyanosis, or edema  LYMPH: No lymphadenopathy noted  RECTAL: No masses, prostate normal size and smooth, Guiac negative   BREAST: No palpatble masses, skin no lesions no retractions, no discharages. adenexa no palpable masses noted   GYN: uterus normal size, adenexa no palpable masses, no CMT, no uterine discharge   SKIN: No rashes or lesions      LABS:                        11.5   6.7   )-----------( 280      ( 29 May 2017 06:19 )             37.0     CBC Full  -  ( 29 May 2017 06:19 )  WBC Count : 6.7 K/uL  Hemoglobin : 11.5 g/dL  Hematocrit : 37.0 %  Platelet Count - Automated : 280 K/uL  Mean Cell Volume : 80.1 fl  Mean Cell Hemoglobin : 24.9 pg  Mean Cell Hemoglobin Concentration : 31.1 g/dL  Auto Neutrophil # : 3.4 K/uL  Auto Lymphocyte # : 2.7 K/uL  Auto Monocyte # : 0.4 K/uL  Auto Eosinophil # : 0.2 K/uL  Auto Basophil # : 0.0 K/uL  Auto Neutrophil % : 51.3 %  Auto Lymphocyte % : 40.2 %  Auto Monocyte % : 5.7 %  Auto Eosinophil % : 2.4 %  Auto Basophil % : 0.3 %        138  |  101  |  14.0  ----------------------------<  219<H>  3.8   |  22.0  |  0.61    Ca    8.8      29 May 2017 06:19  Phos  3.9       Mg     2.0         TPro  7.1  /  Alb  3.9  /  TBili  0.1<L>  /  DBili  x   /  AST  13  /  ALT  13  /  AlkPhos  60          Hemoglobin A1C, Whole Blood: 10.4 % ( @ 11:37)  CAPILLARY BLOOD GLUCOSE  209 (29 May 2017 22:09)  252 (29 May 2017 16:22)  199 (29 May 2017 12:00)  254 (29 May 2017 08:00)            LIVER FUNCTIONS - ( 29 May 2017 06:19 )  Alb: 3.9 g/dL / Pro: 7.1 g/dL / ALK PHOS: 60 U/L / ALT: 13 U/L / AST: 13 U/L / GGT: x             RADIOLOGY & ADDITIONAL TESTS:    TTE:  Summary:   1. Normal biventricular systolic function. Left ventricular ejection fraction, by visual estimation, is 60 to 65%.   2. Mild concentric left ventricular hypertrophy.   3. Spectral Doppler shows impaired relaxation pattern of left ventricular myocardial filling (Grade I diastolic dysfunction).   4. No significant valvular abnormalities.   5. No pericardial effusion.   6. ** Compared to TTE dated 3/30/17, LV no longer hyperdynamic.    MRI w/o contast: IMPRESSION: Acute/subacute moderate size left posterior temporal lobe and left occipital lobe infarcts.    MRA Head w/o contrast: IMPRESSION: Occluded left internal carotid artery and left middle cerebral artery, unchanged.    MRA Neck w/o contrast IMPRESSION:  Limited study secondary to lack of IV contrast. Occluded left internal carotid artery versus long segment dissection/high-grade stenosis. Contrast enhanced MRA of the neck is recommended. Patient is a 47y old  Female who presents with a chief complaint of dysphagia and weakness (28 May 2017 21:23)      INTERVAL HPI/OVERNIGHT EVENTS:  47y  Female seen at bedside. Pt is in no acute distress. We discussed possible rehab, and pt denies that she has a drug problem, "I don't have a problem. I only occasionally use drugs on the weekend." She does not remember conversation we had last night around 6pm. Pt complains that her head hurts 4/10 in the region above her L eye as usual. It is slightly worse than yesterday morning. No other complaints. She claims her last bowel movement was today. She denies N/V/D/C, CP, SOB, heart palpitations, dysuria or frequency, calf pain or edema.    Pt pulled out her IV overnight. When seen by psych was agitated and the evaluation was not completed. During the eval pt had an elevated BP, 183/98, but when manually checked by resident it was WNL, 135/65.    ANTIMICROBIAL:    CARDIOVASCULAR:    PULMONARY:    NEUROLOGIC:  acetaminophen   Tablet. 650milliGRAM(s) Oral every 6 hours PRN    ONCOLOGIC:    HEMATOLOGIC:  dipyridamole 200 mG/aspirin 25 mG 1Capsule(s) Oral two times a day    GATROINTESTINAL:  pantoprazole    Tablet 40milliGRAM(s) Oral before breakfast  docusate sodium 100milliGRAM(s) Oral two times a day  senna 2Tablet(s) Oral at bedtime PRN  polyethylene glycol 3350 17Gram(s) Oral daily PRN     MEDS:    ENDO/METABOLIC:  insulin glargine Injectable (LANTUS) 15Unit(s) SubCutaneous at bedtime  insulin lispro (HumaLOG) corrective regimen sliding scale  SubCutaneous three times a day before meals  dextrose Gel 1Dose(s) Oral once PRN  dextrose 50% Injectable 12.5Gram(s) IV Push once  dextrose 50% Injectable 25Gram(s) IV Push once  glucagon  Injectable 1milliGRAM(s) IntraMuscular once PRN  atorvastatin 40milliGRAM(s) Oral at bedtime  insulin lispro (HumaLOG) corrective regimen sliding scale  SubCutaneous at bedtime    IV FLUID/NUTRITION:  ferrous    sulfate 325milliGRAM(s) Oral daily  ascorbic acid 500milliGRAM(s) Oral daily    TOPICAL:  BACItracin   Ointment 1Application(s) Topical two times a day    IMMUNOLOGIC & OTHER      Allergies    No Known Allergies    Intolerances        Vital Signs Last 24 Hrs  T(C): 36.7, Max: 37.2 ( @ 12:14)  T(F): 98, Max: 99 ( @ 15:20)  HR: 70 (65 - 79)  BP: 135/65 (119/99 - 183/98)  BP(mean): --  RR: 19 (16 - 22)  SpO2: 99% (98% - 99%)      Daily     Daily Weight in k (30 May 2017 00:11)  I&O's Detail  I & Os for 24h ending 29 May 2017 07:00  =============================================  IN:    Oral Fluid: 1000 ml    Total IN: 1000 ml  ---------------------------------------------  OUT:    Total OUT: 0 ml  ---------------------------------------------  Total NET: 1000 ml    I & Os for current day (as of 30 May 2017 06:29)  =============================================  IN:    Oral Fluid: 840 ml    Total IN: 840 ml  ---------------------------------------------  OUT:    Total OUT: 0 ml  ---------------------------------------------  Total NET: 840 ml      REVIEW OF SYSTEMS:    CONSTITUTIONAL: No fever, weight loss, or fatigue  EYES: No eye pain, visual disturbances, or discharge  ENMT:  No difficulty hearing, tinnitus, vertigo; No sinus or throat pain  NECK: No pain or stiffness  RESPIRATORY: No cough, wheezing, chills or hemoptysis; No shortness of breath  CARDIOVASCULAR: No chest pain, palpitations, dizziness, or leg swelling  GASTROINTESTINAL: No abdominal or epigastric pain. No nausea, vomiting, or hematemesis; No diarrhea or constipation. No melena or hematochezia.  GENITOURINARY: No dysuria, frequency, hematuria, or incontinence  NEUROLOGICAL: Headached described above, memory loss. No loss of strength, numbness  SKIN: No itching, burning, rashes, or lesions       PHYSICAL EXAM:    GENERAL: NAD, white head covering  HEAD:  Atraumatic, Normocephalic  EYES: EOMI, PERRLA, conjunctiva and sclera clear  ENMT: No tonsillar erythema, exudates, or enlargement; Moist mucous membranes,   NECK: Supple, No JVD  NERVOUS SYSTEM:  Alert & Oriented X1 to person, Good concentration; Motor Strength 5/5 B/L upper and lower extremities; DTRs 2+ intact and symmetric  CHEST/LUNG: Clear to auscultation bilaterally; No rales, rhonchi, wheezing, or rubs  HEART: Regular rate; No murmurs, rubs, or gallops  ABDOMEN: Soft, Nontender, Nondistended; Bowel sounds present  EXTREMITIES:  2+ Peripheral Pulses, No clubbing, cyanosis, or edema  LYMPH: No lymphadenopathy noted  RECTAL: No masses, prostate normal size and smooth, Guiac negative   BREAST: No palpatble masses, skin no lesions no retractions, no discharages. adenexa no palpable masses noted   GYN: uterus normal size, adenexa no palpable masses, no CMT, no uterine discharge   SKIN: No rashes or lesions      LABS:                        11.5   6.7   )-----------( 280      ( 29 May 2017 06:19 )             37.0     CBC Full  -  ( 29 May 2017 06:19 )  WBC Count : 6.7 K/uL  Hemoglobin : 11.5 g/dL  Hematocrit : 37.0 %  Platelet Count - Automated : 280 K/uL  Mean Cell Volume : 80.1 fl  Mean Cell Hemoglobin : 24.9 pg  Mean Cell Hemoglobin Concentration : 31.1 g/dL  Auto Neutrophil # : 3.4 K/uL  Auto Lymphocyte # : 2.7 K/uL  Auto Monocyte # : 0.4 K/uL  Auto Eosinophil # : 0.2 K/uL  Auto Basophil # : 0.0 K/uL  Auto Neutrophil % : 51.3 %  Auto Lymphocyte % : 40.2 %  Auto Monocyte % : 5.7 %  Auto Eosinophil % : 2.4 %  Auto Basophil % : 0.3 %        138  |  101  |  14.0  ----------------------------<  219<H>  3.8   |  22.0  |  0.61    Ca    8.8      29 May 2017 06:19  Phos  3.9       Mg     2.0         TPro  7.1  /  Alb  3.9  /  TBili  0.1<L>  /  DBili  x   /  AST  13  /  ALT  13  /  AlkPhos  60          Hemoglobin A1C, Whole Blood: 10.4 % ( @ 11:37)  CAPILLARY BLOOD GLUCOSE  209 (29 May 2017 22:09)  252 (29 May 2017 16:22)  199 (29 May 2017 12:00)  254 (29 May 2017 08:00)            LIVER FUNCTIONS - ( 29 May 2017 06:19 )  Alb: 3.9 g/dL / Pro: 7.1 g/dL / ALK PHOS: 60 U/L / ALT: 13 U/L / AST: 13 U/L / GGT: x             RADIOLOGY & ADDITIONAL TESTS:    TTE:  Summary:   1. Normal biventricular systolic function. Left ventricular ejection fraction, by visual estimation, is 60 to 65%.   2. Mild concentric left ventricular hypertrophy.   3. Spectral Doppler shows impaired relaxation pattern of left ventricular myocardial filling (Grade I diastolic dysfunction).   4. No significant valvular abnormalities.   5. No pericardial effusion.   6. ** Compared to TTE dated 3/30/17, LV no longer hyperdynamic.    MRI w/o contast: IMPRESSION: Acute/subacute moderate size left posterior temporal lobe and left occipital lobe infarcts.    MRA Head w/o contrast: IMPRESSION: Occluded left internal carotid artery and left middle cerebral artery, unchanged.    MRA Neck w/o contrast IMPRESSION:  Limited study secondary to lack of IV contrast. Occluded left internal carotid artery versus long segment dissection/high-grade stenosis. Contrast enhanced MRA of the neck is recommended.

## 2017-05-30 NOTE — PROGRESS NOTE ADULT - PROVIDER SPECIALTY LIST ADULT
Family Medicine
Rehab Medicine
Vascular Surgery
Neurology

## 2017-05-30 NOTE — PROGRESS NOTE BEHAVIORAL HEALTH - RISK ASSESSMENT
Low acute risk-pt denies consistent depressive sx's, denies any S/H I/I/P, no h/o suicide attempts, no psychosis, no haroon elicited, denies global insomnia, denies psychic anxiety, she remains future oriented and is able to contract for safety without any ambivalence h/o substance use, and impulsivity increase her long term risk .

## 2017-05-30 NOTE — PROGRESS NOTE ADULT - ATTENDING COMMENTS
Note addended where needed. Plan discussed with patient at bedside. She does not want us to call her sister, states that she will d/w her sister regarding her care here. Patient due to chronicity of sx, evolving Left tempo/parietal cva, and notable area with edema, is sick, concerning regarding her recent cocaine and etoh use, SW eval/drug tox and monitor closely   Prognosis guarded
I have personally seen and examined the patient. I agree with the above history, physical and plan which I have reviewed and edited as appropriate. Patient awake, alert, oriented, calm, comfortable, no complaints except for frontal headache.  Patient is with no focal deficits. Patient denies any suicidal or homicidal ideation.  At this time patient is stable for discharge home.  An appointment has been made at [facility] in [place] for follow up. Patient was counseled extensively about compliance with meds and follow up appointment.  Patient expressed understanding by teach back method.
Note addended where needed. Plan discussed with patient at bedside. Resident team to call family and give an update as patient is slightly confused. Overall prognosis guarded
I have personally seen and examined the patient. I agree with the above history, physical and plan which I have reviewed and edited as appropriate. Patient awake, alert, oriented, calm, comfortable, no complaints. Cardiopulmonary exam is within normal limits.  Patient with c/o frontal headache, denies any blurry vision or neck / eye pain or vomiting.  Patient with CVA ischemic in nature.  Patient with uncontrolled, diabetes, HTN.  Patient was recently in hospital and left against medical advice.  Now with CVA due to non compliance with medications.  Spoke to patient at length, have asked to bring in family members for update but patient declines, as patient is alert, oriented, and with full capacity, spoke to patient at length for over 45 minutes about the need for compliance with meds and about the dangers of using illicit drugs. Patient has expressed understanding of all explanations and states that she will be going to [facility] clinic.  Patient is to be on asa, statin, insulin, metformin, BP meds, see reconciliation sheet for full details.

## 2017-05-30 NOTE — PROGRESS NOTE ADULT - PROBLEM SELECTOR PROBLEM 1
Cerebrovascular accident (CVA), unspecified mechanism
Cerebrovascular accident (CVA) due to stenosis of middle cerebral artery, unspecified blood vessel laterality

## 2017-05-30 NOTE — PROGRESS NOTE ADULT - PROBLEM SELECTOR PLAN 6
Patient reports hx but not on meds.   BP controlled w/o meds in hospital  IV tylenol for headache   will montior vitals closely

## 2017-05-30 NOTE — PROGRESS NOTE ADULT - ASSESSMENT
46 y/o female with pmhx DM II, HTN,GERD, +cocaine and etoh use currently (last time day prior to admission with cocaine) presents to the ED with complaints of weakness and changes in speech. CT scan done with + findings of CVA but due to unknown timing not a TPA candidate. Patient was here in April and found to have a stroke at that time, as well as an occluded left ICA. Now with worsening sx and noted evolving left tempo-parietal left occipital lobe cva with edema and effacement of cortical sulci. No intervention at that time, patient had declined and signed ama. Vascular - no intervention. Found to have L MCA occlusion and L ICA occlusion.

## 2017-05-30 NOTE — PROGRESS NOTE BEHAVIORAL HEALTH - NSBHFUPINTERVALHXFT_PSY_A_CORE
Patient reports she refused consult yesterday because she wanted to sleep and had a headache. She is currently denying any depressed mood, or anhedoni. She reports she is sleeping and eating.  She states "I want to go home".  She denies feeling depressed by murder of her daughter.  She states "I have grandkids and other two children"  She further explains "I have shalini in my life", and she knows to "read the bible". She denies any prior suicide attempts, and aside from a rehab in the past, no prior history of psychiatric treatment.  No manic or psychotic sx's were elicited.  Patient states she wants to take a shower.  She expressed willingness to follow up with therapist after discharge.

## 2017-05-30 NOTE — PROGRESS NOTE BEHAVIORAL HEALTH - SUMMARY
Patient is a 47 year-old  female, with no past psychiatric diagnosis, presented to ED complaining worsening headache and worsening aphasia after leaving AMA s/p CVA a few weeks ago. Patient was medically admitted 5/26/2017 with concern for cardioembolic source for her worsening expressive aphasia, which did not show positive findings, however as per MRI findings indicated occlusion of the left ICA and left MCA. CVA risk factors are noted to be her medical issues: uncontrolled diabetes mellitus, obesity, hypertension, hyperlipidemia and cocaine abuse. Asked to evaluate for possible depression after patient reportedly admitted depression to staff and past history of daughter being murdered.  Patient currently denies depressive sx's. No psychotic or manic sx's elicited.  Denies significant anxiety.  She reports willingness to follow up with therapist.  Patient is at times uncooperative but has not been violent, aggressive, or making any suicidal statements or engaging in any dangerous behavior.  No need for inpatient psychiatric hospitalization. No need for psychiatric medications.  No psychiatric contraindication to discharge. Would involve  and refer to outpatient therapist.  Patient is amenable to this.

## 2017-05-31 LAB
ANA TITR SER: NEGATIVE — SIGNIFICANT CHANGE UP
DSDNA AB SER QL CLIF: NEGATIVE — SIGNIFICANT CHANGE UP

## 2017-06-02 LAB — PTR INTERPRETATION: SIGNIFICANT CHANGE UP

## 2017-06-06 LAB — DNA PLOIDY SPEC FC-IMP: SIGNIFICANT CHANGE UP

## 2017-07-23 PROCEDURE — 84466 ASSAY OF TRANSFERRIN: CPT

## 2017-07-23 PROCEDURE — 70450 CT HEAD/BRAIN W/O DYE: CPT

## 2017-07-23 PROCEDURE — 80061 LIPID PANEL: CPT

## 2017-07-23 PROCEDURE — 97110 THERAPEUTIC EXERCISES: CPT

## 2017-07-23 PROCEDURE — 97167 OT EVAL HIGH COMPLEX 60 MIN: CPT

## 2017-07-23 PROCEDURE — 85045 AUTOMATED RETICULOCYTE COUNT: CPT

## 2017-07-23 PROCEDURE — 82746 ASSAY OF FOLIC ACID SERUM: CPT

## 2017-07-23 PROCEDURE — 85610 PROTHROMBIN TIME: CPT

## 2017-07-23 PROCEDURE — 82962 GLUCOSE BLOOD TEST: CPT

## 2017-07-23 PROCEDURE — 85027 COMPLETE CBC AUTOMATED: CPT

## 2017-07-23 PROCEDURE — 80053 COMPREHEN METABOLIC PANEL: CPT

## 2017-07-23 PROCEDURE — 82607 VITAMIN B-12: CPT

## 2017-07-23 PROCEDURE — 82728 ASSAY OF FERRITIN: CPT

## 2017-07-23 PROCEDURE — 93312 ECHO TRANSESOPHAGEAL: CPT

## 2017-07-23 PROCEDURE — 81025 URINE PREGNANCY TEST: CPT

## 2017-07-23 PROCEDURE — 97163 PT EVAL HIGH COMPLEX 45 MIN: CPT

## 2017-07-23 PROCEDURE — 36415 COLL VENOUS BLD VENIPUNCTURE: CPT

## 2017-07-23 PROCEDURE — 83036 HEMOGLOBIN GLYCOSYLATED A1C: CPT

## 2017-07-23 PROCEDURE — 93880 EXTRACRANIAL BILAT STUDY: CPT

## 2017-07-23 PROCEDURE — 83550 IRON BINDING TEST: CPT

## 2017-07-23 PROCEDURE — 80307 DRUG TEST PRSMV CHEM ANLYZR: CPT

## 2017-07-23 PROCEDURE — 92523 SPEECH SOUND LANG COMPREHEN: CPT

## 2017-07-23 PROCEDURE — 70547 MR ANGIOGRAPHY NECK W/O DYE: CPT

## 2017-07-23 PROCEDURE — 70551 MRI BRAIN STEM W/O DYE: CPT

## 2017-07-23 PROCEDURE — 80048 BASIC METABOLIC PNL TOTAL CA: CPT

## 2017-07-23 PROCEDURE — 97116 GAIT TRAINING THERAPY: CPT

## 2017-07-23 PROCEDURE — 93306 TTE W/DOPPLER COMPLETE: CPT

## 2017-07-23 PROCEDURE — 70544 MR ANGIOGRAPHY HEAD W/O DYE: CPT

## 2017-07-23 PROCEDURE — 93325 DOPPLER ECHO COLOR FLOW MAPG: CPT

## 2017-07-23 PROCEDURE — 93005 ELECTROCARDIOGRAM TRACING: CPT

## 2017-07-23 PROCEDURE — 96374 THER/PROPH/DIAG INJ IV PUSH: CPT

## 2017-07-23 PROCEDURE — 99285 EMERGENCY DEPT VISIT HI MDM: CPT | Mod: 25

## 2018-07-16 ENCOUNTER — EMERGENCY (EMERGENCY)
Facility: HOSPITAL | Age: 48
LOS: 1 days | Discharge: HOME | End: 2018-07-16
Attending: EMERGENCY MEDICINE
Payer: COMMERCIAL

## 2018-07-16 VITALS
HEIGHT: 68 IN | RESPIRATION RATE: 17 BRPM | OXYGEN SATURATION: 97 % | SYSTOLIC BLOOD PRESSURE: 122 MMHG | HEART RATE: 75 BPM | TEMPERATURE: 98 F | WEIGHT: 220.02 LBS | DIASTOLIC BLOOD PRESSURE: 75 MMHG

## 2018-07-16 LAB
APPEARANCE UR: ABNORMAL
BACTERIA # UR AUTO: ABNORMAL
BILIRUB UR-MCNC: NEGATIVE — SIGNIFICANT CHANGE UP
BLD GP AB SCN SERPL QL: ABNORMAL
COLOR SPEC: ABNORMAL
COMMENT - URINE: SIGNIFICANT CHANGE UP
DIFF PNL FLD: ABNORMAL
DIR ANTIGLOB POLYSPECIFIC INTERPRETATION: SIGNIFICANT CHANGE UP
EPI CELLS # UR: SIGNIFICANT CHANGE UP
GLUCOSE UR QL: NEGATIVE MG/DL — SIGNIFICANT CHANGE UP
HCT VFR BLD CALC: 31 % — LOW (ref 37–47)
HGB BLD-MCNC: 9.5 G/DL — LOW (ref 12–16)
KETONES UR-MCNC: NEGATIVE — SIGNIFICANT CHANGE UP
LEUKOCYTE ESTERASE UR-ACNC: ABNORMAL
MCHC RBC-ENTMCNC: 24.7 PG — LOW (ref 27–31)
MCHC RBC-ENTMCNC: 30.6 G/DL — LOW (ref 32–36)
MCV RBC AUTO: 80.7 FL — LOW (ref 81–99)
NITRITE UR-MCNC: POSITIVE
PH UR: 6 — SIGNIFICANT CHANGE UP (ref 5–8)
PLATELET # BLD AUTO: 299 K/UL — SIGNIFICANT CHANGE UP (ref 150–400)
PROT UR-MCNC: 500 MG/DL
RBC # BLD: 3.84 M/UL — LOW (ref 4.4–5.2)
RBC # FLD: 14.7 % — SIGNIFICANT CHANGE UP (ref 11–15.6)
RBC CASTS # UR COMP ASSIST: SIGNIFICANT CHANGE UP /HPF (ref 0–4)
SP GR SPEC: 1.02 — SIGNIFICANT CHANGE UP (ref 1.01–1.02)
TYPE + AB SCN PNL BLD: SIGNIFICANT CHANGE UP
UROBILINOGEN FLD QL: 1 MG/DL
WBC # BLD: 7.6 K/UL — SIGNIFICANT CHANGE UP (ref 4.8–10.8)
WBC # FLD AUTO: 7.6 K/UL — SIGNIFICANT CHANGE UP (ref 4.8–10.8)
WBC UR QL: SIGNIFICANT CHANGE UP

## 2018-07-16 PROCEDURE — 99284 EMERGENCY DEPT VISIT MOD MDM: CPT

## 2018-07-16 NOTE — ED PROVIDER NOTE - OBJECTIVE STATEMENT
y/o F pt with hx of CVA with L residual L sided weakness, GERD, DM and HTN presents to ED c/o lower abdominal pain and vaginal bleeding that began 2 weeks ago. Pt states she has had a procedure done in the hospital previously to stop her vaginal bleeding. She takes ASA daily. denies fever. denies HA or neck pain. no chest pain or sob. no n/v/d. no urinary f/u/d. no back pain. denies illicit drug use. no recent travel. no rash. no other acute issues symptoms or concerns

## 2018-07-16 NOTE — ED PROVIDER NOTE - MEDICAL DECISION MAKING DETAILS
back to baseline no trauma f.u neuro family agrees at [facility] return to ed for intractable HA, persistent vomiting, or new onset motor/sensory deficits hemodynamically stable return to ed for wrsening bnledding lgihtheadness gyn f.u without fail this week pt agrees to plan of care

## 2018-07-16 NOTE — ED ADULT NURSE NOTE - PMH
Acid reflux    Cerebrovascular accident (CVA), unspecified mechanism    Diabetes    HTN (hypertension)

## 2018-07-17 VITALS
TEMPERATURE: 98 F | OXYGEN SATURATION: 99 % | DIASTOLIC BLOOD PRESSURE: 72 MMHG | HEART RATE: 74 BPM | SYSTOLIC BLOOD PRESSURE: 120 MMHG | RESPIRATION RATE: 16 BRPM

## 2018-07-17 PROBLEM — I63.9 CEREBRAL INFARCTION, UNSPECIFIED: Chronic | Status: ACTIVE | Noted: 2017-05-27

## 2018-07-17 LAB
ALLERGY+IMMUNOLOGY DIAG STUDY NOTE: SIGNIFICANT CHANGE UP
BLD GP AB SCN SERPL QL: ABNORMAL
DIR ANTIGLOB POLYSPECIFIC INTERPRETATION: SIGNIFICANT CHANGE UP
TYPE + AB SCN PNL BLD: SIGNIFICANT CHANGE UP

## 2018-07-17 PROCEDURE — 86880 COOMBS TEST DIRECT: CPT

## 2018-07-17 PROCEDURE — 86850 RBC ANTIBODY SCREEN: CPT

## 2018-07-17 PROCEDURE — 86905 BLOOD TYPING RBC ANTIGENS: CPT

## 2018-07-17 PROCEDURE — 81001 URINALYSIS AUTO W/SCOPE: CPT

## 2018-07-17 PROCEDURE — 36415 COLL VENOUS BLD VENIPUNCTURE: CPT

## 2018-07-17 PROCEDURE — 86870 RBC ANTIBODY IDENTIFICATION: CPT

## 2018-07-17 PROCEDURE — 85027 COMPLETE CBC AUTOMATED: CPT

## 2018-07-17 PROCEDURE — 86900 BLOOD TYPING SEROLOGIC ABO: CPT

## 2018-07-17 PROCEDURE — 99283 EMERGENCY DEPT VISIT LOW MDM: CPT

## 2018-07-17 PROCEDURE — 86901 BLOOD TYPING SEROLOGIC RH(D): CPT
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
## 2018-09-20 NOTE — ED PROVIDER NOTE - CROS ED CARDIOVAS ALL NEG
----- Message from Leticia Bingham LMSW sent at 9/20/2018 11:45 AM CDT -----  This LMSW received a referral on the above patient. This LMSW contacted patient/caregiver regarding referral. Patient/Caregiver stated there were no needs at this time. Patient's caregiver reports she has been provided with all necessary resources and information to assist patient with insurance coverage and disability benefits.     Thank you for the referral,    Leticia Bingham LMSW          
negative...

## 2019-01-09 ENCOUNTER — INPATIENT (INPATIENT)
Facility: HOSPITAL | Age: 49
LOS: 4 days | Discharge: HOME | DRG: 69 | End: 2019-01-14
Attending: INTERNAL MEDICINE | Admitting: INTERNAL MEDICINE
Payer: COMMERCIAL

## 2019-01-09 VITALS
WEIGHT: 233.03 LBS | OXYGEN SATURATION: 100 % | SYSTOLIC BLOOD PRESSURE: 122 MMHG | DIASTOLIC BLOOD PRESSURE: 65 MMHG | HEART RATE: 72 BPM | TEMPERATURE: 99 F | RESPIRATION RATE: 16 BRPM

## 2019-01-09 DIAGNOSIS — I63.9 CEREBRAL INFARCTION, UNSPECIFIED: ICD-10-CM

## 2019-01-09 LAB
ALBUMIN SERPL ELPH-MCNC: 4.5 G/DL — SIGNIFICANT CHANGE UP (ref 3.3–5.2)
ALP SERPL-CCNC: 55 U/L — SIGNIFICANT CHANGE UP (ref 40–120)
ALT FLD-CCNC: 13 U/L — SIGNIFICANT CHANGE UP
ANION GAP SERPL CALC-SCNC: 15 MMOL/L — SIGNIFICANT CHANGE UP (ref 5–17)
ANISOCYTOSIS BLD QL: SLIGHT — SIGNIFICANT CHANGE UP
AST SERPL-CCNC: 19 U/L — SIGNIFICANT CHANGE UP
BASOPHILS # BLD AUTO: 0 K/UL — SIGNIFICANT CHANGE UP (ref 0–0.2)
BASOPHILS NFR BLD AUTO: 0.5 % — SIGNIFICANT CHANGE UP (ref 0–2)
BILIRUB SERPL-MCNC: <0.2 MG/DL — LOW (ref 0.4–2)
BUN SERPL-MCNC: 14 MG/DL — SIGNIFICANT CHANGE UP (ref 8–20)
CALCIUM SERPL-MCNC: 9 MG/DL — SIGNIFICANT CHANGE UP (ref 8.6–10.2)
CHLORIDE SERPL-SCNC: 105 MMOL/L — SIGNIFICANT CHANGE UP (ref 98–107)
CK SERPL-CCNC: 64 U/L — SIGNIFICANT CHANGE UP (ref 25–170)
CO2 SERPL-SCNC: 22 MMOL/L — SIGNIFICANT CHANGE UP (ref 22–29)
CREAT SERPL-MCNC: 0.54 MG/DL — SIGNIFICANT CHANGE UP (ref 0.5–1.3)
ELLIPTOCYTES BLD QL SMEAR: SLIGHT — SIGNIFICANT CHANGE UP
EOSINOPHIL # BLD AUTO: 0.1 K/UL — SIGNIFICANT CHANGE UP (ref 0–0.5)
EOSINOPHIL NFR BLD AUTO: 1.6 % — SIGNIFICANT CHANGE UP (ref 0–6)
GLUCOSE BLDC GLUCOMTR-MCNC: 139 MG/DL — HIGH (ref 70–99)
GLUCOSE SERPL-MCNC: 131 MG/DL — HIGH (ref 70–115)
HBA1C BLD-MCNC: 6.8 % — HIGH (ref 4–5.6)
HCT VFR BLD CALC: 34.7 % — LOW (ref 37–47)
HGB BLD-MCNC: 10.2 G/DL — LOW (ref 12–16)
HYPOCHROMIA BLD QL: SLIGHT — SIGNIFICANT CHANGE UP
LYMPHOCYTES # BLD AUTO: 2.6 K/UL — SIGNIFICANT CHANGE UP (ref 1–4.8)
LYMPHOCYTES # BLD AUTO: 40.2 % — SIGNIFICANT CHANGE UP (ref 20–55)
MACROCYTES BLD QL: SLIGHT — SIGNIFICANT CHANGE UP
MCHC RBC-ENTMCNC: 21.7 PG — LOW (ref 27–31)
MCHC RBC-ENTMCNC: 29.4 G/DL — LOW (ref 32–36)
MCV RBC AUTO: 73.7 FL — LOW (ref 81–99)
MICROCYTES BLD QL: SLIGHT — SIGNIFICANT CHANGE UP
MONOCYTES # BLD AUTO: 0.4 K/UL — SIGNIFICANT CHANGE UP (ref 0–0.8)
MONOCYTES NFR BLD AUTO: 5.6 % — SIGNIFICANT CHANGE UP (ref 3–10)
NEUTROPHILS # BLD AUTO: 3.3 K/UL — SIGNIFICANT CHANGE UP (ref 1.8–8)
NEUTROPHILS NFR BLD AUTO: 51.9 % — SIGNIFICANT CHANGE UP (ref 37–73)
OVALOCYTES BLD QL SMEAR: SLIGHT — SIGNIFICANT CHANGE UP
PLAT MORPH BLD: NORMAL — SIGNIFICANT CHANGE UP
PLATELET # BLD AUTO: 295 K/UL — SIGNIFICANT CHANGE UP (ref 150–400)
POTASSIUM SERPL-MCNC: 4.3 MMOL/L — SIGNIFICANT CHANGE UP (ref 3.5–5.3)
POTASSIUM SERPL-SCNC: 4.3 MMOL/L — SIGNIFICANT CHANGE UP (ref 3.5–5.3)
PROT SERPL-MCNC: 7.3 G/DL — SIGNIFICANT CHANGE UP (ref 6.6–8.7)
RBC # BLD: 4.71 M/UL — SIGNIFICANT CHANGE UP (ref 4.4–5.2)
RBC # FLD: 18.6 % — HIGH (ref 11–15.6)
RBC BLD AUTO: ABNORMAL
SODIUM SERPL-SCNC: 142 MMOL/L — SIGNIFICANT CHANGE UP (ref 135–145)
TROPONIN T SERPL-MCNC: <0.01 NG/ML — SIGNIFICANT CHANGE UP (ref 0–0.06)
WBC # BLD: 6.4 K/UL — SIGNIFICANT CHANGE UP (ref 4.8–10.8)
WBC # FLD AUTO: 6.4 K/UL — SIGNIFICANT CHANGE UP (ref 4.8–10.8)

## 2019-01-09 PROCEDURE — 99285 EMERGENCY DEPT VISIT HI MDM: CPT

## 2019-01-09 PROCEDURE — 70450 CT HEAD/BRAIN W/O DYE: CPT | Mod: 26

## 2019-01-09 PROCEDURE — 71045 X-RAY EXAM CHEST 1 VIEW: CPT | Mod: 26

## 2019-01-09 PROCEDURE — 93010 ELECTROCARDIOGRAM REPORT: CPT

## 2019-01-09 PROCEDURE — 99223 1ST HOSP IP/OBS HIGH 75: CPT

## 2019-01-09 RX ORDER — DEXTROSE 50 % IN WATER 50 %
25 SYRINGE (ML) INTRAVENOUS ONCE
Qty: 0 | Refills: 0 | Status: DISCONTINUED | OUTPATIENT
Start: 2019-01-09 | End: 2019-01-14

## 2019-01-09 RX ORDER — ACETAMINOPHEN 500 MG
650 TABLET ORAL EVERY 6 HOURS
Qty: 0 | Refills: 0 | Status: DISCONTINUED | OUTPATIENT
Start: 2019-01-09 | End: 2019-01-14

## 2019-01-09 RX ORDER — GLUCAGON INJECTION, SOLUTION 0.5 MG/.1ML
1 INJECTION, SOLUTION SUBCUTANEOUS ONCE
Qty: 0 | Refills: 0 | Status: DISCONTINUED | OUTPATIENT
Start: 2019-01-09 | End: 2019-01-14

## 2019-01-09 RX ORDER — DEXTROSE 50 % IN WATER 50 %
12.5 SYRINGE (ML) INTRAVENOUS ONCE
Qty: 0 | Refills: 0 | Status: DISCONTINUED | OUTPATIENT
Start: 2019-01-09 | End: 2019-01-14

## 2019-01-09 RX ORDER — INSULIN LISPRO 100/ML
VIAL (ML) SUBCUTANEOUS AT BEDTIME
Qty: 0 | Refills: 0 | Status: DISCONTINUED | OUTPATIENT
Start: 2019-01-09 | End: 2019-01-09

## 2019-01-09 RX ORDER — PANTOPRAZOLE SODIUM 20 MG/1
40 TABLET, DELAYED RELEASE ORAL
Qty: 0 | Refills: 0 | Status: DISCONTINUED | OUTPATIENT
Start: 2019-01-09 | End: 2019-01-14

## 2019-01-09 RX ORDER — INSULIN LISPRO 100/ML
VIAL (ML) SUBCUTANEOUS
Qty: 0 | Refills: 0 | Status: DISCONTINUED | OUTPATIENT
Start: 2019-01-09 | End: 2019-01-09

## 2019-01-09 RX ORDER — DEXTROSE 50 % IN WATER 50 %
15 SYRINGE (ML) INTRAVENOUS ONCE
Qty: 0 | Refills: 0 | Status: DISCONTINUED | OUTPATIENT
Start: 2019-01-09 | End: 2019-01-14

## 2019-01-09 RX ORDER — INSULIN LISPRO 100/ML
VIAL (ML) SUBCUTANEOUS
Qty: 0 | Refills: 0 | Status: DISCONTINUED | OUTPATIENT
Start: 2019-01-09 | End: 2019-01-14

## 2019-01-09 RX ORDER — ASPIRIN AND DIPYRIDAMOLE 25; 200 MG/1; MG/1
1 CAPSULE, EXTENDED RELEASE ORAL
Qty: 0 | Refills: 0 | Status: DISCONTINUED | OUTPATIENT
Start: 2019-01-09 | End: 2019-01-14

## 2019-01-09 RX ORDER — ENOXAPARIN SODIUM 100 MG/ML
40 INJECTION SUBCUTANEOUS EVERY 24 HOURS
Qty: 0 | Refills: 0 | Status: DISCONTINUED | OUTPATIENT
Start: 2019-01-09 | End: 2019-01-14

## 2019-01-09 RX ORDER — ATORVASTATIN CALCIUM 80 MG/1
20 TABLET, FILM COATED ORAL AT BEDTIME
Qty: 0 | Refills: 0 | Status: DISCONTINUED | OUTPATIENT
Start: 2019-01-09 | End: 2019-01-10

## 2019-01-09 RX ORDER — SODIUM CHLORIDE 9 MG/ML
1000 INJECTION, SOLUTION INTRAVENOUS
Qty: 0 | Refills: 0 | Status: DISCONTINUED | OUTPATIENT
Start: 2019-01-09 | End: 2019-01-14

## 2019-01-09 RX ORDER — INSULIN LISPRO 100/ML
VIAL (ML) SUBCUTANEOUS AT BEDTIME
Qty: 0 | Refills: 0 | Status: DISCONTINUED | OUTPATIENT
Start: 2019-01-09 | End: 2019-01-14

## 2019-01-09 RX ADMIN — ATORVASTATIN CALCIUM 20 MILLIGRAM(S): 80 TABLET, FILM COATED ORAL at 21:55

## 2019-01-09 RX ADMIN — Medication 650 MILLIGRAM(S): at 23:15

## 2019-01-09 RX ADMIN — Medication 650 MILLIGRAM(S): at 23:58

## 2019-01-09 RX ADMIN — ASPIRIN AND DIPYRIDAMOLE 1 CAPSULE(S): 25; 200 CAPSULE, EXTENDED RELEASE ORAL at 21:54

## 2019-01-09 RX ADMIN — ENOXAPARIN SODIUM 40 MILLIGRAM(S): 100 INJECTION SUBCUTANEOUS at 21:54

## 2019-01-09 NOTE — H&P ADULT - NSHPPHYSICALEXAM_GEN_ALL_CORE
PHYSICAL EXAMINATION:  GENERAL: NAD, Alert and Oriented x 3 HEENT:  R eye blind.  L eye absent R visual field absent  NECK: Supple.  - JVD.  CARDIOVASCULAR: RRR S1, S2.  LUNGS: CTAB, - rales, - wheezing, - rhonchi.  BACK: - CVA tenderness.  ABDOMEN: Soft, - tenderness, - distension, + BS.  EXTREMITIES: - cyanosis, - clubbing, - edema.  NEUROLOGIC: mild R sided weakness.  Sensation intact.  Slurred speech with word finding difficulty.   PSYCHIATRIC: Calm.  - agitation.    SKIN: - rashes, - lesions. PHYSICAL EXAMINATION:  GENERAL: NAD, Alert and Oriented x 3 HEENT:  R eye blind.  L eye absent R visual field absent  NECK: Supple.  - JVD.  CARDIOVASCULAR: RRR S1, S2.  LUNGS: CTAB, - rales, - wheezing, - rhonchi.  BACK: - CVA tenderness.  ABDOMEN: Soft, - tenderness, - distension, + BS.  EXTREMITIES: - cyanosis, - clubbing, - edema.  NEUROLOGIC: mild L sided weakness.  Sensation intact.  Slurred speech with word finding difficulty.   PSYCHIATRIC: Calm.  - agitation.    SKIN: - rashes, - lesions.
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
## 2019-01-09 NOTE — ED PROVIDER NOTE - OBJECTIVE STATEMENT
47 y/o F with h/o 9 strokes with residual left sided weakness of left arm and leg and slurred speech , htn, hld, depression p/w diplopia and blurred vision for 2 days after she ran out of her meds for >1 wk as she didn't had any money to get medications. Pt lives with friend and came to ED with friend. no fall or trauma. No SI/HI or hallucinations. No nausea, vomiting but had dull headache. Pt states that her slurred speech also got worse and is difficult for he rot articulate words
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
## 2019-01-09 NOTE — H&P ADULT - NSHPLABSRESULTS_GEN_ALL_CORE
VITALS:  Vital Signs Last 24 Hrs  T(C): 37 (09 Jan 2019 12:13), Max: 37 (09 Jan 2019 12:13)  T(F): 98.6 (09 Jan 2019 12:13), Max: 98.6 (09 Jan 2019 12:13)  HR: 72 (09 Jan 2019 12:13) (72 - 72)  BP: 122/65 (09 Jan 2019 12:13) (122/65 - 122/65)  BP(mean): --  RR: 16 (09 Jan 2019 12:13) (16 - 16)  SpO2: 100% (09 Jan 2019 12:13) (100% - 100%) Daily     Daily   CAPILLARY BLOOD GLUCOSE      POCT Blood Glucose.: 131 mg/dL (09 Jan 2019 13:29)  POCT Blood Glucose.: 183 mg/dL (09 Jan 2019 12:09)    I&O's Summary      LABS:                        10.2   6.4   )-----------( 295      ( 09 Jan 2019 15:44 )             34.7     01-09    142  |  105  |  14.0  ----------------------------<  131<H>  4.3   |  22.0  |  0.54    Ca    9.0      09 Jan 2019 15:44    TPro  7.3  /  Alb  4.5  /  TBili  <0.2<L>  /  DBili  x   /  AST  19  /  ALT  13  /  AlkPhos  55  01-09      LIVER FUNCTIONS - ( 09 Jan 2019 15:44 )  Alb: 4.5 g/dL / Pro: 7.3 g/dL / ALK PHOS: 55 U/L / ALT: 13 U/L / AST: 19 U/L / GGT: x             CARDIAC MARKERS ( 09 Jan 2019 15:44 )  x     / <0.01 ng/mL / 64 U/L / x     / x            MEDICATIONS:  atorvastatin 20 milliGRAM(s) Oral at bedtime  dextrose 40% Gel 15 Gram(s) Oral once PRN  dextrose 5%. 1000 milliLiter(s) IV Continuous <Continuous>  dextrose 50% Injectable 12.5 Gram(s) IV Push once  dextrose 50% Injectable 25 Gram(s) IV Push once  dextrose 50% Injectable 25 Gram(s) IV Push once  dipyridamole 200 mG/aspirin 25 mG 1 Capsule(s) Oral two times a day  enoxaparin Injectable 40 milliGRAM(s) SubCutaneous every 24 hours  glucagon  Injectable 1 milliGRAM(s) IntraMuscular once PRN  insulin lispro (HumaLOG) corrective regimen sliding scale   SubCutaneous three times a day before meals  insulin lispro (HumaLOG) corrective regimen sliding scale   SubCutaneous at bedtime  pantoprazole    Tablet 40 milliGRAM(s) Oral before breakfast      < from: CT Head No Cont (01.09.19 @ 14:48) >      Impression: Old left posterior parietal and occipital CVA with cystic   encephalomalacia. No mass effect or acute density changes.            < end of copied text >

## 2019-01-09 NOTE — ED PROVIDER NOTE - EYES, MLM
Clear bilaterally, pupils equal, round and reactive to light. but diplopia with both eyes and worse with left eye than right and cannot count fingers from left eye but can count fingers and read with both eyes

## 2019-01-09 NOTE — H&P ADULT - HISTORY OF PRESENT ILLNESS
49 y/o F PMH CVA with L sided weakness and R visual field deficit, HTN, HLD, DM, not on insulin, presents c/o blurry vision and diplopia x 2 days as well as slurring of speech.  States she gets these symptoms whenever she stops her medications, which she hasn't been able to afford for several weeks.  Her symptoms have improved in ED, now at baseline.  Admits to word finding and blind in R eye, which is baseline.  no new weakness / numbness.  Ambulatory at baseline.  MRA in 2017 showed L ICA and MCA occlusion at that time.  No additional complaints.     FAMILY HISTORY: No h/o stroke in mother, father, siblings.    SOCIAL HISTORY: - alcohol, former IVDA, former smoking.  REVIEW OF SYSTEMS: General: - fatigue, - weight loss, - fevers, - chills.  HEENT: + headache, - hearing disturbances.  EYES: + visual disturbances, + diplopia.  CARDIOVASCULAR: - chest pain, - palpitations.  PULMONARY: - SOB, - cough, - hemoptysis.  GI: - abdominal pain, - nausea, - vomiting, - diarrhea, - constipation.  : - urinary urgency, - frequency, - dysuria.  MUSCULOSKELETAL: - arthralgias, - myalgias.  NEURO:  - weakness, - numbness.  PSYCH: - depression, - anxiety, - suicidal thoughts. SKIN: - rashes, - lesions.  All remaining ROS are negative

## 2019-01-09 NOTE — ED PROVIDER NOTE - MEDICAL DECISION MAKING DETAILS
plan to check labs, ekg, fs, ct head, cxr and likely admit fro recurrent stroke btu otuside tpa window or any aucte intervention window

## 2019-01-09 NOTE — H&P ADULT - ASSESSMENT
> Diplopia, Slurred Speech Concerning for TIA; h/o Stroke - pt noncompliant with medications.  Ordered MRI and MRA with contrast to assess for acute CVA and carotid stenosis. Resume Aggrenox.  Counseled patient on need for medication compliance and medical follow-up. Neurology consult requested. Monitor on telemetry with TIA protocol. Lipid profile. OT and PT.  > L Carotid Stenosis - see above  > Essential HTN - Monitor BP.  Continue oral antihypertensives.  > Hyperlipidemia - diet modification.  Continue Statin.  > Diabetes Type II on longterm Insulin Therapy - monitor fingersticks.  Insulin coverage for hyperglycemia.  Confirmed with pt not on insulin. 	  > DVT Prophylaxis - Lovenox subcut

## 2019-01-09 NOTE — ED ADULT NURSE NOTE - OBJECTIVE STATEMENT
Pt awake, alert and oriented x3 c/o double vision for multiple days.  Pt states she has hx of multiple strokes in the past.  Pt states she has not been taking any of her medications because she has no money to pay for them, unsure of what medications she is on but states "there are 8 of them".  As per pt, speech deficits and left sided weakness from previous strokes, only NEW symptom today is the double vision.  Respirations even and unlabored, denies chest pain.  Abdomen soft, nontender, nondistended.  Skin warm and dry.  Ambulating with steady gait.

## 2019-01-09 NOTE — ED PROVIDER NOTE - CARE PLAN
Principal Discharge DX:	Cerebrovascular accident (CVA), unspecified mechanism  Secondary Diagnosis:	Diplopia

## 2019-01-10 LAB
ANION GAP SERPL CALC-SCNC: 10 MMOL/L — SIGNIFICANT CHANGE UP (ref 5–17)
BUN SERPL-MCNC: 14 MG/DL — SIGNIFICANT CHANGE UP (ref 8–20)
CALCIUM SERPL-MCNC: 8.5 MG/DL — LOW (ref 8.6–10.2)
CHLORIDE SERPL-SCNC: 104 MMOL/L — SIGNIFICANT CHANGE UP (ref 98–107)
CHOLEST SERPL-MCNC: 149 MG/DL — SIGNIFICANT CHANGE UP (ref 110–199)
CO2 SERPL-SCNC: 24 MMOL/L — SIGNIFICANT CHANGE UP (ref 22–29)
CREAT SERPL-MCNC: 0.46 MG/DL — LOW (ref 0.5–1.3)
GLUCOSE BLDC GLUCOMTR-MCNC: 113 MG/DL — HIGH (ref 70–99)
GLUCOSE BLDC GLUCOMTR-MCNC: 133 MG/DL — HIGH (ref 70–99)
GLUCOSE BLDC GLUCOMTR-MCNC: 140 MG/DL — HIGH (ref 70–99)
GLUCOSE BLDC GLUCOMTR-MCNC: 145 MG/DL — HIGH (ref 70–99)
GLUCOSE SERPL-MCNC: 141 MG/DL — HIGH (ref 70–115)
HBA1C BLD-MCNC: 7.1 % — HIGH (ref 4–5.6)
HCT VFR BLD CALC: 34.9 % — LOW (ref 37–47)
HDLC SERPL-MCNC: 35 MG/DL — LOW
HGB BLD-MCNC: 10.3 G/DL — LOW (ref 12–16)
LIPID PNL WITH DIRECT LDL SERPL: 78 MG/DL — SIGNIFICANT CHANGE UP
MCHC RBC-ENTMCNC: 21.7 PG — LOW (ref 27–31)
MCHC RBC-ENTMCNC: 29.5 G/DL — LOW (ref 32–36)
MCV RBC AUTO: 73.6 FL — LOW (ref 81–99)
PLATELET # BLD AUTO: 254 K/UL — SIGNIFICANT CHANGE UP (ref 150–400)
POTASSIUM SERPL-MCNC: 4.1 MMOL/L — SIGNIFICANT CHANGE UP (ref 3.5–5.3)
POTASSIUM SERPL-SCNC: 4.1 MMOL/L — SIGNIFICANT CHANGE UP (ref 3.5–5.3)
RBC # BLD: 4.74 M/UL — SIGNIFICANT CHANGE UP (ref 4.4–5.2)
RBC # FLD: 18.4 % — HIGH (ref 11–15.6)
SODIUM SERPL-SCNC: 138 MMOL/L — SIGNIFICANT CHANGE UP (ref 135–145)
TOTAL CHOLESTEROL/HDL RATIO MEASUREMENT: 4 RATIO — SIGNIFICANT CHANGE UP (ref 3.3–7.1)
TRIGL SERPL-MCNC: 181 MG/DL — SIGNIFICANT CHANGE UP (ref 10–200)
WBC # BLD: 6.5 K/UL — SIGNIFICANT CHANGE UP (ref 4.8–10.8)
WBC # FLD AUTO: 6.5 K/UL — SIGNIFICANT CHANGE UP (ref 4.8–10.8)

## 2019-01-10 PROCEDURE — 99223 1ST HOSP IP/OBS HIGH 75: CPT

## 2019-01-10 PROCEDURE — 99232 SBSQ HOSP IP/OBS MODERATE 35: CPT

## 2019-01-10 RX ORDER — ATORVASTATIN CALCIUM 80 MG/1
40 TABLET, FILM COATED ORAL AT BEDTIME
Qty: 0 | Refills: 0 | Status: DISCONTINUED | OUTPATIENT
Start: 2019-01-10 | End: 2019-01-14

## 2019-01-10 RX ADMIN — ASPIRIN AND DIPYRIDAMOLE 1 CAPSULE(S): 25; 200 CAPSULE, EXTENDED RELEASE ORAL at 05:11

## 2019-01-10 RX ADMIN — PANTOPRAZOLE SODIUM 40 MILLIGRAM(S): 20 TABLET, DELAYED RELEASE ORAL at 05:11

## 2019-01-10 RX ADMIN — ASPIRIN AND DIPYRIDAMOLE 1 CAPSULE(S): 25; 200 CAPSULE, EXTENDED RELEASE ORAL at 17:20

## 2019-01-10 RX ADMIN — ATORVASTATIN CALCIUM 40 MILLIGRAM(S): 80 TABLET, FILM COATED ORAL at 22:38

## 2019-01-10 RX ADMIN — ENOXAPARIN SODIUM 40 MILLIGRAM(S): 100 INJECTION SUBCUTANEOUS at 22:38

## 2019-01-10 RX ADMIN — Medication 650 MILLIGRAM(S): at 08:08

## 2019-01-10 NOTE — PHYSICAL THERAPY INITIAL EVALUATION ADULT - NEUROVASCULAR ASSESSMENT LUE
no discoloration/warm/no tingling/no numbness Melissa Kerr)  2018 09:45:29 Dance, Carrie A  (RN)  2018 07:45:00

## 2019-01-10 NOTE — CONSULT NOTE ADULT - SUBJECTIVE AND OBJECTIVE BOX
[facility]                                     Neurology at [place]                                 Richie Rasmussen, & Steve                                  [address]                                        [place], [place], [address]                                             [phone]    CC: diplopia  HPI: The patient is a 48y Female who presented with double visio that has since resolved.  She says that last night she saw two of everything, now improved. She says she has history of multiple CVA in past and shje has baseline aphasia and left sided weakness from it.  She also has right hemianopsia, which sounded old.  She has had an occlusion of left ICA/MCA as seen on MRA in 2017.   She has not followed with neurology.  She has not taken medications for HTN and HLD for several weeks due to financial reasons.  Neuro eval is requested.    PAST MEDICAL & SURGICAL HISTORY:  Cerebrovascular accident (CVA), unspecified mechanism  Acid reflux  HTN (hypertension)  Diabetes  No significant past surgical history      MEDICATIONS  (STANDING):  atorvastatin 20 milliGRAM(s) Oral at bedtime  dextrose 5%. 1000 milliLiter(s) (50 mL/Hr) IV Continuous <Continuous>  dextrose 50% Injectable 12.5 Gram(s) IV Push once  dextrose 50% Injectable 25 Gram(s) IV Push once  dextrose 50% Injectable 25 Gram(s) IV Push once  dipyridamole 200 mG/aspirin 25 mG 1 Capsule(s) Oral two times a day  enoxaparin Injectable 40 milliGRAM(s) SubCutaneous every 24 hours  insulin lispro (HumaLOG) corrective regimen sliding scale   SubCutaneous three times a day before meals  insulin lispro (HumaLOG) corrective regimen sliding scale   SubCutaneous at bedtime  pantoprazole    Tablet 40 milliGRAM(s) Oral before breakfast    MEDICATIONS  (PRN):  acetaminophen   Tablet .. 650 milliGRAM(s) Oral every 6 hours PRN Temp greater or equal to 38C (100.4F), Moderate Pain (4 - 6)  dextrose 40% Gel 15 Gram(s) Oral once PRN Blood Glucose LESS THAN 70 milliGRAM(s)/deciliter  glucagon  Injectable 1 milliGRAM(s) IntraMuscular once PRN Glucose LESS THAN 70 milligrams/deciliter      Allergies    No Known Allergies    Intolerances        SOCIAL HISTORY:  no tob,   no alcohol   no drugs    FAMILY HISTORY:  No pertinent family history in first degree relatives      ROS:  ROS: 14 point ROS negative other than what is present in HPI or below  aphasia, right HH, left weak    Vital Signs Last 24 Hrs  T(C): 36.6 (10 Kaiden 2019 16:07), Max: 36.9 (10 Kaiden 2019 07:55)  T(F): 97.9 (10 Kaiden 2019 16:07), Max: 98.5 (10 Kaiden 2019 07:55)  HR: 53 (10 Kaiden 2019 16:07) (53 - 71)  BP: 112/68 (10 Kaiden 2019 16:07) (109/65 - 131/73)  BP(mean): --  RR: 18 (10 Kaiden 2019 16:07) (17 - 19)  SpO2: 97% (10 Kaiden 2019 16:07) (97% - 100%)      General: NAD    Detailed Neurologic Exam:    Mental status: The patient is awake and alert and has normal attention span.  The patient is fully oriented to self and hospital. The patient is not oriented to current events. The patient is unable to name objects, can follow commands, has trouble with repeating sentences.    Cranial nerves: Pupils equal and react symmetrically to light. There is no visual field deficit of the right side. Extraocular motion is full with no nystagmus. There is no ptosis. Facial sensation is intact. Facial musculature is symmetric. Palate elevates symmetrically. Shoulder shrug is normal. Tongue is midline.    Motor: There is normal bulk and tone.  There is no tremor.  Strength is 5/5 in the right arm and leg.   Strength is 2/5 in the left arm and 2-3/5  leg.    Sensation: Intact to light touch and pin in 4 extremities    Reflexes: 1-+ throughout and plantar responses are equiviocal.    Cerebellar: There is no dysmetria on finger to nose testing on right, unable to assess left due to weakness.    Gait : deferred    LABS:                         10.3   6.5   )-----------( 254      ( 10 Kaiden 2019 06:04 )             34.9       01-10    138  |  104  |  14.0  ----------------------------<  141<H>  4.1   |  24.0  |  0.46<L>    Ca    8.5<L>      10 Kaiden 2019 06:04    TPro  7.3  /  Alb  4.5  /  TBili  <0.2<L>  /  DBili  x   /  AST  19  /  ALT  13  /  AlkPhos  55  01-09    Lipid Profile (01.10.19 @ 06:04)    Total Cholesterol/HDL Ratio Measurement: 4.0 Ratio    Cholesterol, Serum: 149 mg/dL    Triglycerides, Serum: 181 mg/dL    HDL Cholesterol, Serum: 35: HDL Levels >/= 60 mg/dL are considered beneficial and a "negative" risk  factor.  Effective 08/15/2018: New reference range and interpretive comment. mg/dL    Direct LDL: 78:      RADIOLOGY & ADDITIONAL STUDIES (independently reviewed unless otherwise noted):  CT head showed no acute CVA, old left parietal and occipital CVA.  No mass or blood

## 2019-01-10 NOTE — CONSULT NOTE ADULT - ASSESSMENT
The patient is a 48y Female who is followed by neurology because of diplopia    Diplopia  resolved  could have been TIA from HTN or DM  not compliant with meds for financial reasons    Stroke  due to occluded ICA/MCA  continue asa and increase lipitor to 40 mg daily  speech therapy    will follow with you    Antony Quiroz MD PhD   [id number] The patient is a 48y Female who is followed by neurology because of diplopia    Diplopia  resolved  could have been TIA from HTN or DM  not compliant with meds for financial reasons    Stroke  due to occluded ICA/MCA  continue aggrenox and increase lipitor to 40 mg daily  speech therapy    will follow with you    Antony Quiroz MD PhD   [id number]

## 2019-01-10 NOTE — PROGRESS NOTE ADULT - SUBJECTIVE AND OBJECTIVE BOX
Patient: ALEX MALDONADO [id number] 48y Female                           Internal Medicine Hospitalist Progress Note    Initial HPI:  47 y/o F PMH CVA with L sided weakness and R visual field deficit, HTN, HLD, DM, not on insulin, presents c/o blurry vision and diplopia x 2 days as well as slurring of speech.  CT head showed no acute infarct.  Symptoms were improving in ER.  Admitted for possible TIA.    Interval History:  No new complaints.  No new weakness.  No new visual changes.      ____________________PHYSICAL EXAM:  Vitals reviewed as indicated below  GENERAL:  NAD  HEENT: R eye blind.  L eye: R hemianopsia.    CARDIOVASCULAR:  S1, S2  LUNGS: CTAB  ABDOMEN:  soft, (-) tenderness, (-) distension, (+) bowel sounds, (-) guarding, (-) rebound (-) rigidity  EXTREMITIES:  no cyanosis / clubbing / edema.   NEURO: LUE / LLE weakness (present on admission)  ____________________      BACKGROUND:  HEALTH ISSUES - PROBLEM Dx:        Allergies    No Known Allergies    Intolerances      PAST MEDICAL & SURGICAL HISTORY:  Cerebrovascular accident (CVA), unspecified mechanism  Acid reflux  HTN (hypertension)  Diabetes  No significant past surgical history        VITALS:  Vital Signs Last 24 Hrs  T(C): 36.9 (10 Kaiden 2019 07:55), Max: 37 (09 Jan 2019 12:13)  T(F): 98.5 (10 Kaiden 2019 07:55), Max: 98.6 (09 Jan 2019 12:13)  HR: 68 (10 Kaiden 2019 07:55) (60 - 72)  BP: 109/65 (10 Kaiden 2019 07:55) (109/65 - 131/73)  BP(mean): --  RR: 18 (10 Kaiden 2019 07:55) (16 - 19)  SpO2: 98% (10 Kaiden 2019 07:55) (98% - 100%) Daily     Daily   CAPILLARY BLOOD GLUCOSE      POCT Blood Glucose.: 145 mg/dL (10 Kaiden 2019 08:23)  POCT Blood Glucose.: 139 mg/dL (09 Jan 2019 21:57)  POCT Blood Glucose.: 131 mg/dL (09 Jan 2019 13:29)  POCT Blood Glucose.: 183 mg/dL (09 Jan 2019 12:09)    I&O's Summary        LABS:                        10.3   6.5   )-----------( 254      ( 10 Kaiden 2019 06:04 )             34.9     01-10    138  |  104  |  14.0  ----------------------------<  141<H>  4.1   |  24.0  |  0.46<L>    Ca    8.5<L>      10 Kaiden 2019 06:04    TPro  7.3  /  Alb  4.5  /  TBili  <0.2<L>  /  DBili  x   /  AST  19  /  ALT  13  /  AlkPhos  55  01-09      LIVER FUNCTIONS - ( 09 Jan 2019 15:44 )  Alb: 4.5 g/dL / Pro: 7.3 g/dL / ALK PHOS: 55 U/L / ALT: 13 U/L / AST: 19 U/L / GGT: x             CARDIAC MARKERS ( 09 Jan 2019 15:44 )  x     / <0.01 ng/mL / 64 U/L / x     / x              MEDICATIONS:  MEDICATIONS  (STANDING):  atorvastatin 20 milliGRAM(s) Oral at bedtime  dextrose 5%. 1000 milliLiter(s) (50 mL/Hr) IV Continuous <Continuous>  dextrose 50% Injectable 12.5 Gram(s) IV Push once  dextrose 50% Injectable 25 Gram(s) IV Push once  dextrose 50% Injectable 25 Gram(s) IV Push once  dipyridamole 200 mG/aspirin 25 mG 1 Capsule(s) Oral two times a day  enoxaparin Injectable 40 milliGRAM(s) SubCutaneous every 24 hours  insulin lispro (HumaLOG) corrective regimen sliding scale   SubCutaneous three times a day before meals  insulin lispro (HumaLOG) corrective regimen sliding scale   SubCutaneous at bedtime  pantoprazole    Tablet 40 milliGRAM(s) Oral before breakfast    MEDICATIONS  (PRN):  acetaminophen   Tablet .. 650 milliGRAM(s) Oral every 6 hours PRN Temp greater or equal to 38C (100.4F), Moderate Pain (4 - 6)  dextrose 40% Gel 15 Gram(s) Oral once PRN Blood Glucose LESS THAN 70 milliGRAM(s)/deciliter  glucagon  Injectable 1 milliGRAM(s) IntraMuscular once PRN Glucose LESS THAN 70 milligrams/deciliter

## 2019-01-10 NOTE — PHYSICAL THERAPY INITIAL EVALUATION ADULT - ADDITIONAL COMMENTS
Pt lives in a 2 story house alone, Modified Independent with amb and all ADLs and self care PTA. Amb with [facility].

## 2019-01-10 NOTE — PROGRESS NOTE ADULT - ASSESSMENT
> Diplopia, Slurred Speech Concerning for TIA; h/o Stroke - pt noncompliant with medications  - awaiting MRI, MRA, Neuro input.  Continue Aggrenox, Statin.   > L Carotid Stenosis - see above  > Essential HTN - Monitor BP.  Continue oral antihypertensives.  > Hyperlipidemia - diet modification.  Continue Statin.  > Diabetes Type II not on longterm Insulin Therapy - monitor fingersticks, stable.  Insulin coverage for hyperglycemia.  Confirmed with pt not on insulin. 	  > DVT Prophylaxis - Lovenox subcut

## 2019-01-11 DIAGNOSIS — E11.42 TYPE 2 DIABETES MELLITUS WITH DIABETIC POLYNEUROPATHY: ICD-10-CM

## 2019-01-11 DIAGNOSIS — I10 ESSENTIAL (PRIMARY) HYPERTENSION: ICD-10-CM

## 2019-01-11 DIAGNOSIS — I63.9 CEREBRAL INFARCTION, UNSPECIFIED: ICD-10-CM

## 2019-01-11 DIAGNOSIS — H53.2 DIPLOPIA: ICD-10-CM

## 2019-01-11 LAB
GLUCOSE BLDC GLUCOMTR-MCNC: 127 MG/DL — HIGH (ref 70–99)
GLUCOSE BLDC GLUCOMTR-MCNC: 139 MG/DL — HIGH (ref 70–99)
GLUCOSE BLDC GLUCOMTR-MCNC: 142 MG/DL — HIGH (ref 70–99)
GLUCOSE BLDC GLUCOMTR-MCNC: 154 MG/DL — HIGH (ref 70–99)

## 2019-01-11 PROCEDURE — 99232 SBSQ HOSP IP/OBS MODERATE 35: CPT

## 2019-01-11 RX ADMIN — PANTOPRAZOLE SODIUM 40 MILLIGRAM(S): 20 TABLET, DELAYED RELEASE ORAL at 06:25

## 2019-01-11 RX ADMIN — Medication 650 MILLIGRAM(S): at 01:08

## 2019-01-11 RX ADMIN — Medication 650 MILLIGRAM(S): at 17:03

## 2019-01-11 RX ADMIN — ENOXAPARIN SODIUM 40 MILLIGRAM(S): 100 INJECTION SUBCUTANEOUS at 21:33

## 2019-01-11 RX ADMIN — ATORVASTATIN CALCIUM 40 MILLIGRAM(S): 80 TABLET, FILM COATED ORAL at 21:33

## 2019-01-11 RX ADMIN — Medication 1: at 18:15

## 2019-01-11 RX ADMIN — Medication 650 MILLIGRAM(S): at 02:00

## 2019-01-11 RX ADMIN — ASPIRIN AND DIPYRIDAMOLE 1 CAPSULE(S): 25; 200 CAPSULE, EXTENDED RELEASE ORAL at 06:25

## 2019-01-11 NOTE — PROGRESS NOTE ADULT - SUBJECTIVE AND OBJECTIVE BOX
[facility]                                        Neurology at [place]                                 Richie Rasmussen, & Steve                                  [address]                                        [place], [place], [address]                                             [phone]        CC: CVA    HPI:   The patient is a 48y Female who presented with double vision that has since resolved.    She says that the night prior to arrival she saw two of everything. This has now improved. She says she has history of multiple CVA in past and she has baseline aphasia and left sided weakness from it.  She also has right hemianopsia for the past year or so.  She has had an occlusion of left ICA/MCA as seen on MRA in 2017.   She has not followed with neurology.  She has not taken medications for blood pressure and cholesterol for several weeks due to financial reasons.     Interim history:  No further diplopia.     ROS:   Denies headache or dizziness.  Denies chest pain.  Denies shortness of breath.    MEDICATIONS  (STANDING):  atorvastatin 40 milliGRAM(s) Oral at bedtime  dextrose 5%. 1000 milliLiter(s) (50 mL/Hr) IV Continuous <Continuous>  dipyridamole 200 mG/aspirin 25 mG 1 Capsule(s) Oral two times a day  enoxaparin Injectable 40 milliGRAM(s) SubCutaneous every 24 hours  insulin lispro (HumaLOG) corrective regimen sliding scale   SubCutaneous three times a day before meals  insulin lispro (HumaLOG) corrective regimen sliding scale   SubCutaneous at bedtime  pantoprazole    Tablet 40 milliGRAM(s) Oral before breakfast      Vital Signs Last 24 Hrs  T(C): 36.6 (11 Jan 2019 07:34), Max: 37.1 (10 Kaiden 2019 20:21)  T(F): 97.8 (11 Jan 2019 07:34), Max: 98.7 (10 Kaiden 2019 20:21)  HR: 59 (11 Jan 2019 07:34) (53 - 74)  BP: 147/78 (11 Jan 2019 07:34) (112/68 - 147/78)  BP(mean): 90 (11 Jan 2019 04:35) (84 - 90)  RR: 16 (11 Jan 2019 07:34) (16 - 18)  SpO2: 100% (11 Jan 2019 07:34) (97% - 100%)    Detailed Neurologic Exam:    Mental status: The patient is awake and alert. There is no dysarthria. She has some difficulty with naming objects and repeating. She can follow instructions.     Cranial nerves: Pupils equal and react symmetrically to light. There is a right homonymous visual field deficit. Extraocular motion is full with no nystagmus. There is no ptosis. Facial sensation is intact. Facial musculature is symmetric. Palate elevates symmetrically. Tongue is midline.    Motor: There is normal bulk and tone.  There is no tremor.  Strength grossly 5/5 on the right side.  Strength is 2/5 in the left arm and 3/5 in the left leg.    Sensation: Grossly intact to light touch and pin.    Reflexes: 1+ throughout and plantar responses are flexor.    Cerebellar: No dysmetria on finger nose testing on the right. The left cannot be tested.    Labs:     01-10    138  |  104  |  14.0  ----------------------------<  141<H>  4.1   |  24.0  |  0.46<L>    Ca    8.5<L>      10 Kaiden 2019 06:04    TPro  7.3  /  Alb  4.5  /  TBili  <0.2<L>  /  DBili  x   /  AST  19  /  ALT  13  /  AlkPhos  55  01-09                            10.3   6.5   )-----------( 254      ( 10 Kaiden 2019 06:04 )             34.9

## 2019-01-11 NOTE — OCCUPATIONAL THERAPY INITIAL EVALUATION ADULT - RANGE OF MOTION EXAMINATION
deficits as listed below/Right UE WFLs; Left UE shoulder shrug, 1/4-1/2 elbow flexion, full wrist and digits

## 2019-01-11 NOTE — PROGRESS NOTE ADULT - SUBJECTIVE AND OBJECTIVE BOX
ALEX MALDONADO     Chief Complaint: Patient is a 48y old  Female who presents with a chief complaint of Blurry vision (11 Jan 2019 10:22)      PAST MEDICAL & SURGICAL HISTORY:  Cerebrovascular accident (CVA), unspecified mechanism  Acid reflux  HTN (hypertension)  Diabetes  No significant past surgical history      HPI/OVERNIGHT EVENTS: Patient lying in bed. She complains of some aphasia and left sided weakness.    MEDICATIONS  (STANDING):  atorvastatin 40 milliGRAM(s) Oral at bedtime  dextrose 5%. 1000 milliLiter(s) (50 mL/Hr) IV Continuous <Continuous>  dextrose 50% Injectable 12.5 Gram(s) IV Push once  dextrose 50% Injectable 25 Gram(s) IV Push once  dextrose 50% Injectable 25 Gram(s) IV Push once  dipyridamole 200 mG/aspirin 25 mG 1 Capsule(s) Oral two times a day  enoxaparin Injectable 40 milliGRAM(s) SubCutaneous every 24 hours  insulin lispro (HumaLOG) corrective regimen sliding scale   SubCutaneous three times a day before meals  insulin lispro (HumaLOG) corrective regimen sliding scale   SubCutaneous at bedtime  pantoprazole    Tablet 40 milliGRAM(s) Oral before breakfast      Vital Signs Last 24 Hrs  T(C): 36.9 (11 Jan 2019 11:27), Max: 37.1 (10 Kaiden 2019 20:21)  T(F): 98.5 (11 Jan 2019 11:27), Max: 98.7 (10 Kaiden 2019 20:21)  HR: 68 (11 Jan 2019 11:27) (53 - 74)  BP: 120/74 (11 Jan 2019 11:27) (112/68 - 147/78)  BP(mean): 90 (11 Jan 2019 04:35) (84 - 90)  RR: 18 (11 Jan 2019 11:27) (16 - 18)  SpO2: 100% (11 Jan 2019 11:27) (97% - 100%)    PHYSICAL EXAM:  HEENT: PERRLA, EOMI, Normal Hearing  Neck: No LAD, No JVD  Back: No CVA tenderness  Respiratory: CTAB Cardiovascular: S1 and S2, RRR, no M/G/R  Gastrointestinal: BS+, soft, NT/ND  Extremities: No peripheral edema  Vascular: 2+ peripheral pulses  Neurological: A/O x 3, no focal deficits        CAPILLARY BLOOD GLUCOSE    LABS:                        10.3   6.5   )-----------( 254      ( 10 Kaiden 2019 06:04 )             34.9     01-10    138  |  104  |  14.0  ----------------------------<  141<H>  4.1   |  24.0  |  0.46<L>    Ca    8.5<L>      10 Kaiden 2019 06:04    TPro  7.3  /  Alb  4.5  /  TBili  <0.2<L>  /  DBili  x   /  AST  19  /  ALT  13  /  AlkPhos  55  01-09          RADIOLOGY & ADDITIONAL TESTS:

## 2019-01-11 NOTE — OCCUPATIONAL THERAPY INITIAL EVALUATION ADULT - LIVES WITH, PROFILE
friend/in a private house; pt reports that she stays with another friend during the day who has a handicap accessible house and assists friend with IADLs

## 2019-01-11 NOTE — PROGRESS NOTE ADULT - PROBLEM SELECTOR PLAN 1
Neuro input appreciated. She has a history of cva with persistent symptoms. Her new symptom diplopia has resolved.

## 2019-01-11 NOTE — PROGRESS NOTE ADULT - ASSESSMENT
48 year old female history of cva with aphasia and left sided weakness. Now with worsening aphasia that has resolved. Awaiting MRI

## 2019-01-11 NOTE — PROGRESS NOTE ADULT - ASSESSMENT
48y Female who is followed by neurology because of diplopia    Diplopia  Resolved  Likely TIA.  Not compliant with meds for financial reasons.  Awaiting MRI.     Stroke  Due to occluded ICA/MCA  Continue Aggrenox and increase Lipitor to 40 mg daily  Speech therapy.  PT/OT.    Hypertension   Control blood pressure.

## 2019-01-12 LAB
ANION GAP SERPL CALC-SCNC: 11 MMOL/L — SIGNIFICANT CHANGE UP (ref 5–17)
BUN SERPL-MCNC: 15 MG/DL — SIGNIFICANT CHANGE UP (ref 8–20)
CALCIUM SERPL-MCNC: 8.5 MG/DL — LOW (ref 8.6–10.2)
CHLORIDE SERPL-SCNC: 107 MMOL/L — SIGNIFICANT CHANGE UP (ref 98–107)
CO2 SERPL-SCNC: 23 MMOL/L — SIGNIFICANT CHANGE UP (ref 22–29)
CREAT SERPL-MCNC: 0.55 MG/DL — SIGNIFICANT CHANGE UP (ref 0.5–1.3)
GLUCOSE BLDC GLUCOMTR-MCNC: 121 MG/DL — HIGH (ref 70–99)
GLUCOSE BLDC GLUCOMTR-MCNC: 128 MG/DL — HIGH (ref 70–99)
GLUCOSE BLDC GLUCOMTR-MCNC: 132 MG/DL — HIGH (ref 70–99)
GLUCOSE BLDC GLUCOMTR-MCNC: 157 MG/DL — HIGH (ref 70–99)
GLUCOSE SERPL-MCNC: 128 MG/DL — HIGH (ref 70–115)
HCT VFR BLD CALC: 34.1 % — LOW (ref 37–47)
HGB BLD-MCNC: 9.9 G/DL — LOW (ref 12–16)
MAGNESIUM SERPL-MCNC: 1.8 MG/DL — SIGNIFICANT CHANGE UP (ref 1.6–2.6)
MCHC RBC-ENTMCNC: 21.5 PG — LOW (ref 27–31)
MCHC RBC-ENTMCNC: 29 G/DL — LOW (ref 32–36)
MCV RBC AUTO: 74.1 FL — LOW (ref 81–99)
PHOSPHATE SERPL-MCNC: 3.8 MG/DL — SIGNIFICANT CHANGE UP (ref 2.4–4.7)
PLATELET # BLD AUTO: 264 K/UL — SIGNIFICANT CHANGE UP (ref 150–400)
POTASSIUM SERPL-MCNC: 4.1 MMOL/L — SIGNIFICANT CHANGE UP (ref 3.5–5.3)
POTASSIUM SERPL-SCNC: 4.1 MMOL/L — SIGNIFICANT CHANGE UP (ref 3.5–5.3)
RBC # BLD: 4.6 M/UL — SIGNIFICANT CHANGE UP (ref 4.4–5.2)
RBC # FLD: 18.6 % — HIGH (ref 11–15.6)
SODIUM SERPL-SCNC: 141 MMOL/L — SIGNIFICANT CHANGE UP (ref 135–145)
WBC # BLD: 6.4 K/UL — SIGNIFICANT CHANGE UP (ref 4.8–10.8)
WBC # FLD AUTO: 6.4 K/UL — SIGNIFICANT CHANGE UP (ref 4.8–10.8)

## 2019-01-12 PROCEDURE — 70551 MRI BRAIN STEM W/O DYE: CPT | Mod: 26

## 2019-01-12 PROCEDURE — 70544 MR ANGIOGRAPHY HEAD W/O DYE: CPT | Mod: 26,59

## 2019-01-12 PROCEDURE — 99232 SBSQ HOSP IP/OBS MODERATE 35: CPT

## 2019-01-12 PROCEDURE — 70547 MR ANGIOGRAPHY NECK W/O DYE: CPT | Mod: 26

## 2019-01-12 RX ADMIN — Medication 1 MILLIGRAM(S): at 14:50

## 2019-01-12 RX ADMIN — ENOXAPARIN SODIUM 40 MILLIGRAM(S): 100 INJECTION SUBCUTANEOUS at 21:31

## 2019-01-12 RX ADMIN — Medication 1: at 09:13

## 2019-01-12 RX ADMIN — ATORVASTATIN CALCIUM 40 MILLIGRAM(S): 80 TABLET, FILM COATED ORAL at 21:31

## 2019-01-12 RX ADMIN — ASPIRIN AND DIPYRIDAMOLE 1 CAPSULE(S): 25; 200 CAPSULE, EXTENDED RELEASE ORAL at 18:02

## 2019-01-12 NOTE — PROGRESS NOTE ADULT - SUBJECTIVE AND OBJECTIVE BOX
ALEX MALDONADO     Chief Complaint: Patient is a 48y old  Female who presents with a chief complaint of Blurry vision (12 Jan 2019 11:58)      PAST MEDICAL & SURGICAL HISTORY:  Cerebrovascular accident (CVA), unspecified mechanism  Acid reflux  HTN (hypertension)  Diabetes  No significant past surgical history      HPI/OVERNIGHT EVENTS: Patient has more difficulty speaking today. She is scheduled for MRI.    MEDICATIONS  (STANDING):  atorvastatin 40 milliGRAM(s) Oral at bedtime  dextrose 5%. 1000 milliLiter(s) (50 mL/Hr) IV Continuous <Continuous>  dextrose 50% Injectable 12.5 Gram(s) IV Push once  dextrose 50% Injectable 25 Gram(s) IV Push once  dextrose 50% Injectable 25 Gram(s) IV Push once  dipyridamole 200 mG/aspirin 25 mG 1 Capsule(s) Oral two times a day  enoxaparin Injectable 40 milliGRAM(s) SubCutaneous every 24 hours  insulin lispro (HumaLOG) corrective regimen sliding scale   SubCutaneous three times a day before meals  insulin lispro (HumaLOG) corrective regimen sliding scale   SubCutaneous at bedtime  pantoprazole    Tablet 40 milliGRAM(s) Oral before breakfast      Vital Signs Last 24 Hrs  T(C): 36.5 (12 Jan 2019 06:34), Max: 36.5 (12 Jan 2019 06:34)  T(F): 97.7 (12 Jan 2019 06:34), Max: 97.7 (12 Jan 2019 06:34)  HR: 68 (12 Jan 2019 06:34) (63 - 68)  BP: 139/81 (12 Jan 2019 06:34) (108/74 - 139/81)  BP(mean): --  RR: 18 (12 Jan 2019 06:34) (17 - 18)  SpO2: 98% (12 Jan 2019 06:34) (98% - 100%)    PHYSICAL EXAM:  HEENT: PERRLA, EOMI, Normal Hearing  Neck: No LAD, No JVD  Back: No CVA tenderness  Respiratory: CTAB Cardiovascular: S1 and S2, RRR, no M/G/R  Gastrointestinal: BS+, soft, NT/ND  Extremities: No peripheral edema  Vascular: 2+ peripheral pulses  Neurological: A/O x 3, no focal deficits        CAPILLARY BLOOD GLUCOSE    LABS:                        9.9    6.4   )-----------( 264      ( 12 Jan 2019 06:50 )             34.1     01-12    141  |  107  |  15.0  ----------------------------<  128<H>  4.1   |  23.0  |  0.55    Ca    8.5<L>      12 Jan 2019 06:50  Phos  3.8     01-12  Mg     1.8     01-12            RADIOLOGY & ADDITIONAL TESTS: ALEX MALDONADO     Chief Complaint: Patient is a 48y old  Female who presents with a chief complaint of Blurry vision (12 Jan 2019 11:58)      PAST MEDICAL & SURGICAL HISTORY:  Cerebrovascular accident (CVA), unspecified mechanism  Acid reflux  HTN (hypertension)  Diabetes  No significant past surgical history      HPI/OVERNIGHT EVENTS: Patient has more difficulty speaking today. She is scheduled for MRI.    MEDICATIONS  (STANDING):  atorvastatin 40 milliGRAM(s) Oral at bedtime  dextrose 5%. 1000 milliLiter(s) (50 mL/Hr) IV Continuous <Continuous>  dextrose 50% Injectable 12.5 Gram(s) IV Push once  dextrose 50% Injectable 25 Gram(s) IV Push once  dextrose 50% Injectable 25 Gram(s) IV Push once  dipyridamole 200 mG/aspirin 25 mG 1 Capsule(s) Oral two times a day  enoxaparin Injectable 40 milliGRAM(s) SubCutaneous every 24 hours  insulin lispro (HumaLOG) corrective regimen sliding scale   SubCutaneous three times a day before meals  insulin lispro (HumaLOG) corrective regimen sliding scale   SubCutaneous at bedtime  pantoprazole    Tablet 40 milliGRAM(s) Oral before breakfast      Vital Signs Last 24 Hrs  T(C): 36.5 (12 Jan 2019 06:34), Max: 36.5 (12 Jan 2019 06:34)  T(F): 97.7 (12 Jan 2019 06:34), Max: 97.7 (12 Jan 2019 06:34)  HR: 68 (12 Jan 2019 06:34) (63 - 68)  BP: 139/81 (12 Jan 2019 06:34) (108/74 - 139/81)  BP(mean): --  RR: 18 (12 Jan 2019 06:34) (17 - 18)  SpO2: 98% (12 Jan 2019 06:34) (98% - 100%)    PHYSICAL EXAM:  HEENT: PERRLA, EOMI, Normal Hearing  Neck: No LAD, No JVD  Back: No CVA tenderness  Respiratory: CTAB Cardiovascular: S1 and S2, RRR, no M/G/R  Gastrointestinal: BS+, soft, NT/ND  Extremities: No peripheral edema  Vascular: 2+ peripheral pulses  Neurological: A/O x  3, mild aphasia      CAPILLARY BLOOD GLUCOSE    LABS:                        9.9    6.4   )-----------( 264      ( 12 Jan 2019 06:50 )             34.1     01-12    141  |  107  |  15.0  ----------------------------<  128<H>  4.1   |  23.0  |  0.55    Ca    8.5<L>      12 Jan 2019 06:50  Phos  3.8     01-12  Mg     1.8     01-12            RADIOLOGY & ADDITIONAL TESTS:

## 2019-01-12 NOTE — PROGRESS NOTE ADULT - ASSESSMENT
48 year old female history of cva with aphasia and left sided weakness. Now with worsening aphasia that has resolved. Awaiting MRI 48 year old female history of cva with aphasia and left sided weakness. Now with worsening aphasia that has resolved. Awaiting MRI  Patient has been noncompliant as an outpatient due to financial reasons. Increase lipitor and continue aggrenox.

## 2019-01-12 NOTE — PROGRESS NOTE ADULT - ASSESSMENT
48y Female who is followed by neurology because of diplopia    Diplopia  Resolved  Likely TIA.  Not compliant with meds for financial reasons.  Awaiting MRI.     Stroke (prior)  Due to occluded ICA/MCA  Continue Aggrenox and increase Lipitor to 40 mg daily  Speech therapy.  PT/OT.    Hypertension   Control blood pressure.    Discussed with Dr Malave.

## 2019-01-12 NOTE — PROGRESS NOTE ADULT - SUBJECTIVE AND OBJECTIVE BOX
[facility]                                        Neurology at [place]                                 Richie Rasmussen, & Steve                                  [address]                                        [place], [place], [address]                                             [phone]        CC: CVA    HPI:   The patient is a 48y Female who presented with double vision that has since resolved.    She says that the night prior to arrival she saw two of everything. This has now improved. She says she has history of multiple CVA in past and she has baseline aphasia and left sided weakness from it.  She also has right hemianopsia for the past year or so.  She has had an occlusion of left ICA/MCA as seen on MRA in 2017.   She has not followed with neurology.  She has not taken medications for blood pressure and cholesterol for several weeks due to financial reasons.     Interim history:  No further diplopia.     ROS:   Denies headache or dizziness.  Denies chest pain.  Denies shortness of breath.    MEDICATIONS  (STANDING):  atorvastatin 40 milliGRAM(s) Oral at bedtime  dextrose 5%. 1000 milliLiter(s) (50 mL/Hr) IV Continuous <Continuous>  dipyridamole 200 mG/aspirin 25 mG 1 Capsule(s) Oral two times a day  enoxaparin Injectable 40 milliGRAM(s) SubCutaneous every 24 hours  insulin lispro (HumaLOG) corrective regimen sliding scale   SubCutaneous three times a day before meals  insulin lispro (HumaLOG) corrective regimen sliding scale   SubCutaneous at bedtime  pantoprazole    Tablet 40 milliGRAM(s) Oral before breakfast      Vital Signs Last 24 Hrs  T(C): 36.5 (12 Jan 2019 06:34), Max: 36.5 (12 Jan 2019 06:34)  T(F): 97.7 (12 Jan 2019 06:34), Max: 97.7 (12 Jan 2019 06:34)  HR: 68 (12 Jan 2019 06:34) (63 - 68)  BP: 139/81 (12 Jan 2019 06:34) (108/74 - 139/81)  BP(mean): --  RR: 18 (12 Jan 2019 06:34) (17 - 18)  SpO2: 98% (12 Jan 2019 06:34) (98% - 100%)    Detailed Neurologic Exam:    Mental status: The patient is awake and alert. There is no dysarthria. She has some difficulty with naming objects and repeating. She can follow instructions.     Cranial nerves: Pupils equal and react symmetrically to light. There is a right homonymous visual field deficit. Extraocular motion is full with no nystagmus. There is no ptosis. Facial sensation is intact. Facial musculature is symmetric. Palate elevates symmetrically. Tongue is midline.    Motor: There is normal bulk and tone.  There is no tremor.  Strength grossly 5/5 on the right side.  Strength is 2/5 in the left arm and 3/5 in the left leg.    Sensation: Grossly intact to light touch and pin.    Reflexes: 1+ throughout and plantar responses are flexor.    Cerebellar: No dysmetria on finger nose testing on the right. The left cannot be tested.    Labs:     01-12    141  |  107  |  15.0  ----------------------------<  128<H>  4.1   |  23.0  |  0.55    Ca    8.5<L>      12 Jan 2019 06:50  Phos  3.8     01-12  Mg     1.8     01-12                          9.9    6.4   )-----------( 264      ( 12 Jan 2019 06:50 )             34.1

## 2019-01-12 NOTE — PROGRESS NOTE ADULT - PROBLEM SELECTOR PLAN 1
Neuro input appreciated. She has a history of cva with persistent symptoms. Her new symptom diplopia has resolved. Neuro input appreciated. She has a history of cva with persistent symptoms. Her new symptom diplopia has resolved. MRI scheduled for 1/12

## 2019-01-13 ENCOUNTER — TRANSCRIPTION ENCOUNTER (OUTPATIENT)
Age: 49
End: 2019-01-13

## 2019-01-13 LAB
ALBUMIN SERPL ELPH-MCNC: 4.1 G/DL — SIGNIFICANT CHANGE UP (ref 3.3–5.2)
ALP SERPL-CCNC: 55 U/L — SIGNIFICANT CHANGE UP (ref 40–120)
ALT FLD-CCNC: 18 U/L — SIGNIFICANT CHANGE UP
ANION GAP SERPL CALC-SCNC: 11 MMOL/L — SIGNIFICANT CHANGE UP (ref 5–17)
ANISOCYTOSIS BLD QL: SLIGHT — SIGNIFICANT CHANGE UP
AST SERPL-CCNC: 17 U/L — SIGNIFICANT CHANGE UP
BILIRUB SERPL-MCNC: 0.3 MG/DL — LOW (ref 0.4–2)
BUN SERPL-MCNC: 12 MG/DL — SIGNIFICANT CHANGE UP (ref 8–20)
CALCIUM SERPL-MCNC: 8.9 MG/DL — SIGNIFICANT CHANGE UP (ref 8.6–10.2)
CHLORIDE SERPL-SCNC: 106 MMOL/L — SIGNIFICANT CHANGE UP (ref 98–107)
CO2 SERPL-SCNC: 22 MMOL/L — SIGNIFICANT CHANGE UP (ref 22–29)
CREAT SERPL-MCNC: 0.52 MG/DL — SIGNIFICANT CHANGE UP (ref 0.5–1.3)
ELLIPTOCYTES BLD QL SMEAR: SLIGHT — SIGNIFICANT CHANGE UP
GLUCOSE BLDC GLUCOMTR-MCNC: 117 MG/DL — HIGH (ref 70–99)
GLUCOSE BLDC GLUCOMTR-MCNC: 144 MG/DL — HIGH (ref 70–99)
GLUCOSE BLDC GLUCOMTR-MCNC: 144 MG/DL — HIGH (ref 70–99)
GLUCOSE BLDC GLUCOMTR-MCNC: 151 MG/DL — HIGH (ref 70–99)
GLUCOSE SERPL-MCNC: 150 MG/DL — HIGH (ref 70–115)
HCT VFR BLD CALC: 35.5 % — LOW (ref 37–47)
HGB BLD-MCNC: 10.5 G/DL — LOW (ref 12–16)
HYPOCHROMIA BLD QL: SLIGHT — SIGNIFICANT CHANGE UP
INR BLD: 1.03 RATIO — SIGNIFICANT CHANGE UP (ref 0.88–1.16)
LYMPHOCYTES # BLD AUTO: 48 % — SIGNIFICANT CHANGE UP (ref 20–55)
MACROCYTES BLD QL: SLIGHT — SIGNIFICANT CHANGE UP
MCHC RBC-ENTMCNC: 21.7 PG — LOW (ref 27–31)
MCHC RBC-ENTMCNC: 29.6 G/DL — LOW (ref 32–36)
MCV RBC AUTO: 73.5 FL — LOW (ref 81–99)
MICROCYTES BLD QL: SLIGHT — SIGNIFICANT CHANGE UP
NEUTROPHILS NFR BLD AUTO: 52 % — SIGNIFICANT CHANGE UP (ref 37–73)
PLAT MORPH BLD: NORMAL — SIGNIFICANT CHANGE UP
PLATELET # BLD AUTO: 182 K/UL — SIGNIFICANT CHANGE UP (ref 150–400)
POIKILOCYTOSIS BLD QL AUTO: SLIGHT — SIGNIFICANT CHANGE UP
POTASSIUM SERPL-MCNC: 4 MMOL/L — SIGNIFICANT CHANGE UP (ref 3.5–5.3)
POTASSIUM SERPL-SCNC: 4 MMOL/L — SIGNIFICANT CHANGE UP (ref 3.5–5.3)
PROT SERPL-MCNC: 7 G/DL — SIGNIFICANT CHANGE UP (ref 6.6–8.7)
PROTHROM AB SERPL-ACNC: 11.9 SEC — SIGNIFICANT CHANGE UP (ref 10–12.9)
RBC # BLD: 4.83 M/UL — SIGNIFICANT CHANGE UP (ref 4.4–5.2)
RBC # FLD: 18.8 % — HIGH (ref 11–15.6)
RBC BLD AUTO: ABNORMAL
SODIUM SERPL-SCNC: 139 MMOL/L — SIGNIFICANT CHANGE UP (ref 135–145)
WBC # BLD: 0.1 K/UL — CRITICAL LOW (ref 4.8–10.8)
WBC # FLD AUTO: 0.1 K/UL — CRITICAL LOW (ref 4.8–10.8)

## 2019-01-13 PROCEDURE — 99232 SBSQ HOSP IP/OBS MODERATE 35: CPT

## 2019-01-13 PROCEDURE — 70450 CT HEAD/BRAIN W/O DYE: CPT | Mod: 26

## 2019-01-13 PROCEDURE — 99233 SBSQ HOSP IP/OBS HIGH 50: CPT

## 2019-01-13 RX ADMIN — ENOXAPARIN SODIUM 40 MILLIGRAM(S): 100 INJECTION SUBCUTANEOUS at 21:34

## 2019-01-13 RX ADMIN — ASPIRIN AND DIPYRIDAMOLE 1 CAPSULE(S): 25; 200 CAPSULE, EXTENDED RELEASE ORAL at 17:04

## 2019-01-13 RX ADMIN — ASPIRIN AND DIPYRIDAMOLE 1 CAPSULE(S): 25; 200 CAPSULE, EXTENDED RELEASE ORAL at 05:17

## 2019-01-13 RX ADMIN — ATORVASTATIN CALCIUM 40 MILLIGRAM(S): 80 TABLET, FILM COATED ORAL at 21:34

## 2019-01-13 RX ADMIN — Medication 650 MILLIGRAM(S): at 21:34

## 2019-01-13 RX ADMIN — Medication 650 MILLIGRAM(S): at 22:30

## 2019-01-13 RX ADMIN — PANTOPRAZOLE SODIUM 40 MILLIGRAM(S): 20 TABLET, DELAYED RELEASE ORAL at 05:17

## 2019-01-13 NOTE — DISCHARGE NOTE ADULT - MEDICATION SUMMARY - MEDICATIONS TO STOP TAKING
I will STOP taking the medications listed below when I get home from the hospital:    Lanmaya Zamarripaostar Pen 100 units/mL subcutaneous solution  -- 15 unit(s) subcutaneous once a day (at bedtime)  -- Do not drink alcoholic beverages when taking this medication.  It is very important that you take or use this exactly as directed.  Do not skip doses or discontinue unless directed by your doctor.  Keep in refrigerator.  Do not freeze.    bacitracin 500 units/g topical ointment  -- 1 application on skin 2 times a day    ferrous sulfate 325 mg (65 mg elemental iron) oral tablet  -- 1 tab(s) by mouth 3 times a day  -- Check with your doctor before becoming pregnant.  Do not chew, break, or crush.  May discolor urine or feces.    docusate sodium 100 mg oral capsule  -- 1 cap(s) by mouth 2 times a day    polyethylene glycol 3350 oral powder for reconstitution  -- 17 gram(s) by mouth once a day, As needed, Constipation -for constipation    senna oral tablet  -- 2 tab(s) by mouth once a day (at bedtime), As needed, Constipation -for constipation    pantoprazole 40 mg oral delayed release tablet  -- 1 tab(s) by mouth once a day (before a meal)    ascorbic acid 500 mg oral tablet  -- 1 tab(s) by mouth 3 times a day I will STOP taking the medications listed below when I get home from the hospital:    Benedicto Venegasar Pen 100 units/mL subcutaneous solution  -- 15 unit(s) subcutaneous once a day (at bedtime)  -- Do not drink alcoholic beverages when taking this medication.  It is very important that you take or use this exactly as directed.  Do not skip doses or discontinue unless directed by your doctor.  Keep in refrigerator.  Do not freeze.    aspirin-dipyridamole 25 mg-200 mg oral capsule, extended release  -- 1 cap(s) by mouth 2 times a day    bacitracin 500 units/g topical ointment  -- 1 application on skin 2 times a day    ferrous sulfate 325 mg (65 mg elemental iron) oral tablet  -- 1 tab(s) by mouth 3 times a day  -- Check with your doctor before becoming pregnant.  Do not chew, break, or crush.  May discolor urine or feces.    docusate sodium 100 mg oral capsule  -- 1 cap(s) by mouth 2 times a day    polyethylene glycol 3350 oral powder for reconstitution  -- 17 gram(s) by mouth once a day, As needed, Constipation -for constipation    senna oral tablet  -- 2 tab(s) by mouth once a day (at bedtime), As needed, Constipation -for constipation    pantoprazole 40 mg oral delayed release tablet  -- 1 tab(s) by mouth once a day (before a meal)    ascorbic acid 500 mg oral tablet  -- 1 tab(s) by mouth 3 times a day

## 2019-01-13 NOTE — PROGRESS NOTE ADULT - SUBJECTIVE AND OBJECTIVE BOX
Code 1 Stroke Note  Patient is a 48y old  Female with a history of cva with aphasia and left sided weakness and noted worsening aphasia that has resolved. Pt was admitted to r/o CVA.    Rapid response team called because pt was noted to have new onset of worsening L arm and leg weakness/stiffness as well as a headache in the back of her head.    Patient was seen and examined at the bedside by the rapid response team.      Vital Signs at beginning of rapid:    T 97.1  /74  HR 68  O2 sat 99% RA  Fingerstick glucose 144  Monitor: NSR    Allergies    No Known Allergies    Intolerances        PAST MEDICAL & SURGICAL HISTORY:  Cerebrovascular accident (CVA), unspecified mechanism  Acid reflux  HTN (hypertension)  Diabetes  No significant past surgical history      GENERAL: The patient is awake and alert.   HEENT: Head is normocephalic and atraumatic. Extraocular muscles are intact. Mucous membranes are moist.  LUNGS: Clear to auscultation BL without wheezing, rales or rhonchi; respirations unlabored  HEART: S1S2  ABDOMEN: Soft, nontender, and nondistended  EXTREMITIES: Without any cyanosis, clubbing, rash, lesions or edema.  MSK: 2/5 strength in L upper and lower extremity(BASELINE). 5/5 strength in R upper and lower extremities. Sensation intact throughout.  Mental status: The patient is awake and alert. Anxious. There is no dysarthria. She has some difficulty with repeating. She can follow instructions.   Cranial nerves: Pupils equal and react symmetrically to light. There is a right homonymous visual field deficit. Extraocular motion is full with no nystagmus. There is no ptosis. Facial sensation is intact. Facial musculature is symmetric. Palate elevates symmetrically. Tongue is midline.  Motor: There is normal bulk and tone.  There is no tremor.  Strength grossly 5/5 on the right side.  Strength is 2/5 in the left arm and left leg.  Sensation: Grossly intact to light touch.  Reflexes: 1+ throughout and plantar responses are flexor.  Cerebellar: No dysmetria on finger nose testing on the right. The left cannot be tested.                            9.9    6.4   )-----------( 264      ( 12 Jan 2019 06:50 )             34.1     01-12    141  |  107  |  15.0  ----------------------------<  128<H>  4.1   |  23.0  |  0.55    Ca    8.5<L>      12 Jan 2019 06:50  Phos  3.8     01-12  Mg     1.8     01-12            MEDICATIONS  (STANDING):  atorvastatin 40 milliGRAM(s) Oral at bedtime  dextrose 5%. 1000 milliLiter(s) (50 mL/Hr) IV Continuous <Continuous>  dextrose 50% Injectable 12.5 Gram(s) IV Push once  dextrose 50% Injectable 25 Gram(s) IV Push once  dextrose 50% Injectable 25 Gram(s) IV Push once  dipyridamole 200 mG/aspirin 25 mG 1 Capsule(s) Oral two times a day  enoxaparin Injectable 40 milliGRAM(s) SubCutaneous every 24 hours  insulin lispro (HumaLOG) corrective regimen sliding scale   SubCutaneous three times a day before meals  insulin lispro (HumaLOG) corrective regimen sliding scale   SubCutaneous at bedtime  pantoprazole    Tablet 40 milliGRAM(s) Oral before breakfast    MEDICATIONS  (PRN):  acetaminophen   Tablet .. 650 milliGRAM(s) Oral every 6 hours PRN Temp greater or equal to 38C (100.4F), Moderate Pain (4 - 6)  dextrose 40% Gel 15 Gram(s) Oral once PRN Blood Glucose LESS THAN 70 milliGRAM(s)/deciliter  glucagon  Injectable 1 milliGRAM(s) IntraMuscular once PRN Glucose LESS THAN 70 milligrams/deciliter      Rapid Response called for 48y year old Female with a past medical history of CVA with aphasia and left sided weakness and noted worsening aphasia that has resolved. Pt was admitted to r/o CVA.    1. TIA  -Dr. Ferrell notified by SROC at bedside. Dr. Ferrell stated these findings were at her baseline.  -NIH Stroke scale assessed  -CT head Stat- showed no new infarcts. Same old infarcts as yesterday's imaging.  -F/U CBC, CMP, PT/PTT/INR  -Upgraded level of care to 4 Bracket.  -Attending physician, Dr. Malave was notified about assessment and agrees with plan.

## 2019-01-13 NOTE — DISCHARGE NOTE ADULT - SECONDARY DIAGNOSIS.
Gastroesophageal reflux disease without esophagitis Type 2 diabetes mellitus with diabetic polyneuropathy, without long-term current use of insulin Cerebrovascular accident (CVA), unspecified mechanism

## 2019-01-13 NOTE — PROGRESS NOTE ADULT - PROBLEM SELECTOR PLAN 1
Neuro input appreciated. She has a history of cva with persistent symptoms. Her new symptom diplopia has resolved. MRI scheduled for 1/12 Neuro input appreciated. She has a history of cva with persistent symptoms. Her new symptom diplopia has resolved. MRI findings noted. Prepare for discharge

## 2019-01-13 NOTE — PROGRESS NOTE ADULT - SUBJECTIVE AND OBJECTIVE BOX
[facility]                                        Neurology at [place]                                 Richie Rasmussen, & Steve                                  [address]                                        [place], [place], [address]                                             [phone]        CC: CVA    HPI:   The patient is a 48y Female who presented with double vision that has since resolved.    She says that the night prior to arrival she saw two of everything. This has now improved. She says she has history of multiple CVA in past and she has baseline aphasia and left sided weakness from it.  She also has right hemianopsia for the past year or so.  She has had an occlusion of left ICA/MCA as seen on MRA in 2017.   She has not followed with neurology.  She has not taken medications for blood pressure and cholesterol for several weeks due to financial reasons.     Interim history:  No further diplopia.   Code stroke called this morning due to speech difficulty and "left sided weakness"    ROS:   Denies headache or dizziness.  Denies chest pain.  Denies shortness of breath.    MEDICATIONS  (STANDING):  atorvastatin 40 milliGRAM(s) Oral at bedtime  dextrose 5%. 1000 milliLiter(s) (50 mL/Hr) IV Continuous <Continuous>  dipyridamole 200 mG/aspirin 25 mG 1 Capsule(s) Oral two times a day  enoxaparin Injectable 40 milliGRAM(s) SubCutaneous every 24 hours  insulin lispro (HumaLOG) corrective regimen sliding scale   SubCutaneous three times a day before meals  insulin lispro (HumaLOG) corrective regimen sliding scale   SubCutaneous at bedtime  pantoprazole    Tablet 40 milliGRAM(s) Oral before breakfast      Vital Signs Last 24 Hrs  T(C): 36.6 (13 Jan 2019 08:15), Max: 36.6 (13 Jan 2019 08:15)  T(F): 97.8 (13 Jan 2019 08:15), Max: 97.8 (13 Jan 2019 08:15)  HR: 72 (13 Jan 2019 07:47) (66 - 72)  BP: 119/74 (13 Jan 2019 07:47) (119/74 - 140/90)  BP(mean): 85 (13 Jan 2019 07:47) (85 - 85)  RR: 20 (13 Jan 2019 07:47) (18 - 20)  SpO2: 98% (13 Jan 2019 07:47) (98% - 100%)    Detailed Neurologic Exam:    Mental status: The patient is awake and alert. There is no dysarthria. She has some difficulty with naming objects and repeating. She can follow instructions. She has a labile affect and appears anxious at times and tearful at other times.     Cranial nerves: Pupils equal and react symmetrically to light. There is a right homonymous visual field deficit. Extraocular motion is full with no nystagmus. There is no ptosis. Facial sensation is intact. Facial musculature is symmetric. Palate elevates symmetrically. Tongue is midline.    Motor: There is normal bulk.  There is no tremor. There is some increased tone on the left.  Strength grossly 5/5 on the right side.  Strength is 2/5 in the left arm and 3/5 in the left leg.    Sensation: Grossly intact to light touch and pin.    Reflexes: 1+ throughout and plantar responses are flexor.    Cerebellar: No dysmetria on finger nose testing on the right. The left cannot be tested.    Labs:     01-12    141  |  107  |  15.0  ----------------------------<  128<H>  4.1   |  23.0  |  0.55    Ca    8.5<L>      12 Jan 2019 06:50  Phos  3.8     01-12  Mg     1.8     01-12                              9.9    6.4   )-----------( 264      ( 12 Jan 2019 06:50 )             34.1       Rad:   Repeat CT head images reviewed (and concur with report): There is no acute pathology.     MRI brain images reviewed (and concur with report): There is no acute pathology.   There is encephalomalacia in the left PCA territory with some T2 shine through at the periphery. There is some chronic ischemic change as well.

## 2019-01-13 NOTE — DISCHARGE NOTE ADULT - HOSPITAL COURSE
48 year old female history of cva with aphasia and left sided weakness. Now with worsening aphasia that has resolved. MRI shows no new findings.  Patient has been noncompliant as an outpatient due to financial reasons. Increase lipitor and discontinue aggrenox and add plavix.  1/13 Patient is anxious over left sided weakness and aphasia. Code stroke was called and ct scan and MRI confirmed no new stroke. I reassured her that there is no new stroke. She is dealing with the difficulty of the old stroke. I recommended a splint for her left hand and continuing therapy.   1/14 Stable for discharge with outpatient follow up.     Problem/Plan - 1:  ·  Problem: Cerebrovascular accident (CVA), unspecified mechanism.  Plan: Neuro input appreciated. She has a history of cva with persistent symptoms. Her new symptom diplopia has resolved. MRI findings noted. No new findings.      Problem/Plan - 2:  ·  Problem: Diplopia.  Plan: resolved.      Problem/Plan - 3:  ·  Problem: Essential hypertension.  Plan: Tolerable BP on meds.      Problem/Plan - 4:  ·  Problem: Type 2 diabetes mellitus with diabetic polyneuropathy, without long-term current use of insulin.  Plan: Tolerable glycemia.     Attending Attestation:   I was physically present for the key portions of the evaluation and management (E/M) service provided.  I agree with the above history, physical, and plan which I have reviewed and edited where appropriate.     47 minutes spent on total encounter; more than 50% of the visit was spent counseling and/or coordinating care by the attending physician.

## 2019-01-13 NOTE — CHART NOTE - NSCHARTNOTEFT_GEN_A_CORE
. Pt was sitting up and eating breakfast. No new deficits or complaints at this time. CT head was negative per [facility]. Management per primary care team.

## 2019-01-13 NOTE — PROGRESS NOTE ADULT - SUBJECTIVE AND OBJECTIVE BOX
ALEX RAINEYIE     Chief Complaint: Patient is a 48y old  Female who presents with a chief complaint of Blurry vision (13 Jan 2019 11:20)      PAST MEDICAL & SURGICAL HISTORY:  Cerebrovascular accident (CVA), unspecified mechanism  Acid reflux  HTN (hypertension)  Diabetes  No significant past surgical history      HPI/OVERNIGHT EVENTS:    MEDICATIONS  (STANDING):  atorvastatin 40 milliGRAM(s) Oral at bedtime  dextrose 5%. 1000 milliLiter(s) (50 mL/Hr) IV Continuous <Continuous>  dextrose 50% Injectable 12.5 Gram(s) IV Push once  dextrose 50% Injectable 25 Gram(s) IV Push once  dextrose 50% Injectable 25 Gram(s) IV Push once  dipyridamole 200 mG/aspirin 25 mG 1 Capsule(s) Oral two times a day  enoxaparin Injectable 40 milliGRAM(s) SubCutaneous every 24 hours  insulin lispro (HumaLOG) corrective regimen sliding scale   SubCutaneous three times a day before meals  insulin lispro (HumaLOG) corrective regimen sliding scale   SubCutaneous at bedtime  pantoprazole    Tablet 40 milliGRAM(s) Oral before breakfast      Vital Signs Last 24 Hrs  T(C): 36.6 (13 Jan 2019 08:15), Max: 36.6 (13 Jan 2019 08:15)  T(F): 97.8 (13 Jan 2019 08:15), Max: 97.8 (13 Jan 2019 08:15)  HR: 70 (13 Jan 2019 11:18) (66 - 72)  BP: 120/78 (13 Jan 2019 11:18) (119/74 - 140/90)  BP(mean): 90 (13 Jan 2019 11:18) (85 - 90)  RR: 14 (13 Jan 2019 11:18) (14 - 20)  SpO2: 99% (13 Jan 2019 11:18) (98% - 100%)    PHYSICAL EXAM:  HEENT: PERRLA, EOMI, Normal Hearing  Neck: No LAD, No JVD  Back: No CVA tenderness  Respiratory: CTAB Cardiovascular: S1 and S2, RRR, no M/G/R  Gastrointestinal: BS+, soft, NT/ND  Extremities: No peripheral edema  Vascular: 2+ peripheral pulses  Neurological: A/O x 3, no focal deficits        CAPILLARY BLOOD GLUCOSE    LABS:                        10.5   0.1   )-----------( 182      ( 13 Jan 2019 11:14 )             35.5     01-13    139  |  106  |  12.0  ----------------------------<  150<H>  4.0   |  22.0  |  0.52    Ca    8.9      13 Jan 2019 11:14  Phos  3.8     01-12  Mg     1.8     01-12    TPro  7.0  /  Alb  4.1  /  TBili  0.3<L>  /  DBili  x   /  AST  17  /  ALT  18  /  AlkPhos  55  01-13    PT/INR - ( 13 Jan 2019 11:14 )   PT: 11.9 sec;   INR: 1.03 ratio               RADIOLOGY & ADDITIONAL TESTS: ALEX MALDONADO     Chief Complaint: Patient is a 48y old  Female who presents with a chief complaint of Blurry vision (13 Jan 2019 11:20)      PAST MEDICAL & SURGICAL HISTORY:  Cerebrovascular accident (CVA), unspecified mechanism  Acid reflux  HTN (hypertension)  Diabetes  No significant past surgical history      HPI/OVERNIGHT EVENTS:  Events noted. Patient had code stroke called. She is now stable. MRI findings noted.    MEDICATIONS  (STANDING):  atorvastatin 40 milliGRAM(s) Oral at bedtime  dextrose 5%. 1000 milliLiter(s) (50 mL/Hr) IV Continuous <Continuous>  dextrose 50% Injectable 12.5 Gram(s) IV Push once  dextrose 50% Injectable 25 Gram(s) IV Push once  dextrose 50% Injectable 25 Gram(s) IV Push once  dipyridamole 200 mG/aspirin 25 mG 1 Capsule(s) Oral two times a day  enoxaparin Injectable 40 milliGRAM(s) SubCutaneous every 24 hours  insulin lispro (HumaLOG) corrective regimen sliding scale   SubCutaneous three times a day before meals  insulin lispro (HumaLOG) corrective regimen sliding scale   SubCutaneous at bedtime  pantoprazole    Tablet 40 milliGRAM(s) Oral before breakfast      Vital Signs Last 24 Hrs  T(C): 36.6 (13 Jan 2019 08:15), Max: 36.6 (13 Jan 2019 08:15)  T(F): 97.8 (13 Jan 2019 08:15), Max: 97.8 (13 Jan 2019 08:15)  HR: 70 (13 Jan 2019 11:18) (66 - 72)  BP: 120/78 (13 Jan 2019 11:18) (119/74 - 140/90)  BP(mean): 90 (13 Jan 2019 11:18) (85 - 90)  RR: 14 (13 Jan 2019 11:18) (14 - 20)  SpO2: 99% (13 Jan 2019 11:18) (98% - 100%)    PHYSICAL EXAM:  HEENT: PERRLA, EOMI, Normal Hearing  Neck: No LAD, No JVD  Back: No CVA tenderness  Respiratory: CTAB Cardiovascular: S1 and S2, RRR, no M/G/R  Gastrointestinal: BS+, soft, NT/ND  Extremities: No peripheral edema  Vascular: 2+ peripheral pulses  Neurological: left sided weakness upper and lower extremity        CAPILLARY BLOOD GLUCOSE    LABS:                        10.5   0.1   )-----------( 182      ( 13 Jan 2019 11:14 )             35.5     01-13    139  |  106  |  12.0  ----------------------------<  150<H>  4.0   |  22.0  |  0.52    Ca    8.9      13 Jan 2019 11:14  Phos  3.8     01-12  Mg     1.8     01-12    TPro  7.0  /  Alb  4.1  /  TBili  0.3<L>  /  DBili  x   /  AST  17  /  ALT  18  /  AlkPhos  55  01-13    PT/INR - ( 13 Jan 2019 11:14 )   PT: 11.9 sec;   INR: 1.03 ratio               RADIOLOGY & ADDITIONAL TESTS: ALEX MALDONADO     Chief Complaint: Patient is a 48y old  Female who presents with a chief complaint of Blurry vision (13 Jan 2019 11:20)      PAST MEDICAL & SURGICAL HISTORY:  Cerebrovascular accident (CVA), unspecified mechanism  Acid reflux  HTN (hypertension)  Diabetes  No significant past surgical history      HPI/OVERNIGHT EVENTS:  Events noted. Patient had code stroke called. She is now stable. MRI findings noted. Low wbc likely lab error, repeat cbc in am    MEDICATIONS  (STANDING):  atorvastatin 40 milliGRAM(s) Oral at bedtime  dextrose 5%. 1000 milliLiter(s) (50 mL/Hr) IV Continuous <Continuous>  dextrose 50% Injectable 12.5 Gram(s) IV Push once  dextrose 50% Injectable 25 Gram(s) IV Push once  dextrose 50% Injectable 25 Gram(s) IV Push once  dipyridamole 200 mG/aspirin 25 mG 1 Capsule(s) Oral two times a day  enoxaparin Injectable 40 milliGRAM(s) SubCutaneous every 24 hours  insulin lispro (HumaLOG) corrective regimen sliding scale   SubCutaneous three times a day before meals  insulin lispro (HumaLOG) corrective regimen sliding scale   SubCutaneous at bedtime  pantoprazole    Tablet 40 milliGRAM(s) Oral before breakfast      Vital Signs Last 24 Hrs  T(C): 36.6 (13 Jan 2019 08:15), Max: 36.6 (13 Jan 2019 08:15)  T(F): 97.8 (13 Jan 2019 08:15), Max: 97.8 (13 Jan 2019 08:15)  HR: 70 (13 Jan 2019 11:18) (66 - 72)  BP: 120/78 (13 Jan 2019 11:18) (119/74 - 140/90)  BP(mean): 90 (13 Jan 2019 11:18) (85 - 90)  RR: 14 (13 Jan 2019 11:18) (14 - 20)  SpO2: 99% (13 Jan 2019 11:18) (98% - 100%)    PHYSICAL EXAM:  HEENT: PERRLA, EOMI, Normal Hearing  Neck: No LAD, No JVD  Back: No CVA tenderness  Respiratory: CTAB Cardiovascular: S1 and S2, RRR, no M/G/R  Gastrointestinal: BS+, soft, NT/ND  Extremities: No peripheral edema  Vascular: 2+ peripheral pulses  Neurological: left sided weakness upper and lower extremity        CAPILLARY BLOOD GLUCOSE    LABS:                        10.5   0.1   )-----------( 182      ( 13 Jan 2019 11:14 )             35.5     01-13    139  |  106  |  12.0  ----------------------------<  150<H>  4.0   |  22.0  |  0.52    Ca    8.9      13 Jan 2019 11:14  Phos  3.8     01-12  Mg     1.8     01-12    TPro  7.0  /  Alb  4.1  /  TBili  0.3<L>  /  DBili  x   /  AST  17  /  ALT  18  /  AlkPhos  55  01-13    PT/INR - ( 13 Jan 2019 11:14 )   PT: 11.9 sec;   INR: 1.03 ratio               RADIOLOGY & ADDITIONAL TESTS:

## 2019-01-13 NOTE — DISCHARGE NOTE ADULT - MEDICATION SUMMARY - MEDICATIONS TO TAKE
I will START or STAY ON the medications listed below when I get home from the hospital:    Glucophage 1000 mg oral tablet  -- 1 tab(s) by mouth 2 times a day  -- Check with your doctor before becoming pregnant.  Do not drink alcoholic beverages when taking this medication.  It is very important that you take or use this exactly as directed.  Do not skip doses or discontinue unless directed by your doctor.  Obtain medical advice before taking any non-prescription drugs as some may affect the action of this medication.  Take with food or milk.    -- Indication: For Diabetes    atorvastatin 40 mg oral tablet  -- 1 tab(s) by mouth once a day (at bedtime)  -- Indication: For Hyperlipidemia    aspirin-dipyridamole 25 mg-200 mg oral capsule, extended release  -- 1 cap(s) by mouth 2 times a day  -- Indication: For Stroke    omeprazole 10 mg oral delayed release capsule  -- 1 cap(s) by mouth once a day   -- It is very important that you take or use this exactly as directed.  Do not skip doses or discontinue unless directed by your doctor.  Obtain medical advice before taking any non-prescription drugs as some may affect the action of this medication.  Swallow whole.  Do not crush.    -- Indication: For GERD I will START or STAY ON the medications listed below when I get home from the hospital:    Glucophage 1000 mg oral tablet  -- 1 tab(s) by mouth 2 times a day  -- Check with your doctor before becoming pregnant.  Do not drink alcoholic beverages when taking this medication.  It is very important that you take or use this exactly as directed.  Do not skip doses or discontinue unless directed by your doctor.  Obtain medical advice before taking any non-prescription drugs as some may affect the action of this medication.  Take with food or milk.    -- Indication: For Diabetes    atorvastatin 40 mg oral tablet  -- 1 tab(s) by mouth once a day (at bedtime)  -- Indication: For Hyperlipidemia    Plavix 75 mg oral tablet  -- 1 tab(s) by mouth once a day   -- Do not take aspirin or aspirin containing products without knowledge and consent of your physician.    -- Indication: For Stroke    omeprazole 10 mg oral delayed release capsule  -- 1 cap(s) by mouth once a day   -- It is very important that you take or use this exactly as directed.  Do not skip doses or discontinue unless directed by your doctor.  Obtain medical advice before taking any non-prescription drugs as some may affect the action of this medication.  Swallow whole.  Do not crush.    -- Indication: For GERD

## 2019-01-13 NOTE — DISCHARGE NOTE ADULT - PLAN OF CARE
Prevent recurrent stroke Patient advised to comply with therapy and follow up with PMD Avoid acid reflux Tolerable glycemia

## 2019-01-13 NOTE — DISCHARGE NOTE ADULT - MEDICATION SUMMARY - MEDICATIONS TO CHANGE
I will SWITCH the dose or number of times a day I take the medications listed below when I get home from the hospital:    atorvastatin 20 mg oral tablet  -- 1 tab(s) by mouth once a day (at bedtime)

## 2019-01-13 NOTE — PROGRESS NOTE ADULT - ASSESSMENT
48y Female who is followed by neurology because of diplopia    Diplopia  Resolved  Likely TIA.  Not compliant with meds for financial reasons.    "Code stroke on 1/13/19"  Clearly when this was called, the neurology notes were not reviewed as the speech difficulty and left sided findings have been present since admission.   This is currently at baseline.     Stroke (prior)  Due to occluded ICA/MCA  Continue Aggrenox and increase Lipitor to 40 mg daily  Speech therapy.  PT/OT.    Hypertension   Control blood pressure.    Discharge planning.   Consider rehab eval.     Case discussed with Dr Malave.

## 2019-01-13 NOTE — DISCHARGE NOTE ADULT - CARE PLAN
Principal Discharge DX:	Diplopia  Secondary Diagnosis:	Gastroesophageal reflux disease without esophagitis  Secondary Diagnosis:	Type 2 diabetes mellitus with diabetic polyneuropathy, without long-term current use of insulin  Secondary Diagnosis:	Cerebrovascular accident (CVA), unspecified mechanism Principal Discharge DX:	Diplopia  Goal:	Prevent recurrent stroke  Assessment and plan of treatment:	Patient advised to comply with therapy and follow up with PMD  Secondary Diagnosis:	Gastroesophageal reflux disease without esophagitis  Goal:	Avoid acid reflux  Secondary Diagnosis:	Type 2 diabetes mellitus with diabetic polyneuropathy, without long-term current use of insulin  Goal:	Tolerable glycemia  Secondary Diagnosis:	Cerebrovascular accident (CVA), unspecified mechanism  Goal:	Prevent recurrent stroke

## 2019-01-13 NOTE — DISCHARGE NOTE ADULT - CARE PROVIDER_API CALL
Luisito Amin), Neurology; Vascular Neurology  [address] [place] [address]  Phone: [phone]  Fax: [phone]

## 2019-01-13 NOTE — PROGRESS NOTE ADULT - ASSESSMENT
48 year old female history of cva with aphasia and left sided weakness. Now with worsening aphasia that has resolved. Awaiting MRI  Patient has been noncompliant as an outpatient due to financial reasons. Increase lipitor and continue aggrenox. 48 year old female history of cva with aphasia and left sided weakness. Now with worsening aphasia that has resolved. Awaiting MRI  Patient has been noncompliant as an outpatient due to financial reasons. Increase lipitor and continue aggrenox.  1/13 Patient is anxious over left sided weakness and aphasia. Code stroke was called and ct scan and MRI confirmed no new stroke. I reassured her that there is no new stroke. She is dealing with the difficulty of the old stroke. 48 year old female history of cva with aphasia and left sided weakness. Now with worsening aphasia that has resolved. Awaiting MRI  Patient has been noncompliant as an outpatient due to financial reasons. Increase lipitor and continue aggrenox.  1/13 Patient is anxious over left sided weakness and aphasia. Code stroke was called and ct scan and MRI confirmed no new stroke. I reassured her that there is no new stroke. She is dealing with the difficulty of the old stroke. I recommended a splint for her left hand and continuing therapy. Prepare for discharge. 48 year old female history of cva with aphasia and left sided weakness. Now with worsening aphasia that has resolved. Awaiting MRI  Patient has been noncompliant as an outpatient due to financial reasons. Increase lipitor and continue aggrenox.  1/13 Patient is anxious over left sided weakness and aphasia. Code stroke was called and ct scan and MRI confirmed no new stroke. I reassured her that there is no new stroke. She is dealing with the difficulty of the old stroke. I recommended a splint for her left hand and continuing therapy. Prepare for discharge. Low wbc likely lab error, repeat cbc in am

## 2019-01-14 VITALS — HEART RATE: 70 BPM | DIASTOLIC BLOOD PRESSURE: 68 MMHG | RESPIRATION RATE: 16 BRPM | SYSTOLIC BLOOD PRESSURE: 115 MMHG

## 2019-01-14 LAB
ANION GAP SERPL CALC-SCNC: 13 MMOL/L — SIGNIFICANT CHANGE UP (ref 5–17)
BUN SERPL-MCNC: 10 MG/DL — SIGNIFICANT CHANGE UP (ref 8–20)
CALCIUM SERPL-MCNC: 8.8 MG/DL — SIGNIFICANT CHANGE UP (ref 8.6–10.2)
CHLORIDE SERPL-SCNC: 106 MMOL/L — SIGNIFICANT CHANGE UP (ref 98–107)
CO2 SERPL-SCNC: 23 MMOL/L — SIGNIFICANT CHANGE UP (ref 22–29)
CREAT SERPL-MCNC: 0.51 MG/DL — SIGNIFICANT CHANGE UP (ref 0.5–1.3)
GLUCOSE BLDC GLUCOMTR-MCNC: 145 MG/DL — HIGH (ref 70–99)
GLUCOSE SERPL-MCNC: 181 MG/DL — HIGH (ref 70–115)
HCT VFR BLD CALC: 35.5 % — LOW (ref 37–47)
HGB BLD-MCNC: 10.2 G/DL — LOW (ref 12–16)
MAGNESIUM SERPL-MCNC: 1.8 MG/DL — SIGNIFICANT CHANGE UP (ref 1.6–2.6)
MCHC RBC-ENTMCNC: 21.5 PG — LOW (ref 27–31)
MCHC RBC-ENTMCNC: 28.7 G/DL — LOW (ref 32–36)
MCV RBC AUTO: 74.7 FL — LOW (ref 81–99)
PHOSPHATE SERPL-MCNC: 3.3 MG/DL — SIGNIFICANT CHANGE UP (ref 2.4–4.7)
PLATELET # BLD AUTO: 265 K/UL — SIGNIFICANT CHANGE UP (ref 150–400)
POTASSIUM SERPL-MCNC: 3.9 MMOL/L — SIGNIFICANT CHANGE UP (ref 3.5–5.3)
POTASSIUM SERPL-SCNC: 3.9 MMOL/L — SIGNIFICANT CHANGE UP (ref 3.5–5.3)
RBC # BLD: 4.75 M/UL — SIGNIFICANT CHANGE UP (ref 4.4–5.2)
RBC # FLD: 18.6 % — HIGH (ref 11–15.6)
SODIUM SERPL-SCNC: 142 MMOL/L — SIGNIFICANT CHANGE UP (ref 135–145)
WBC # BLD: 6.6 K/UL — SIGNIFICANT CHANGE UP (ref 4.8–10.8)
WBC # FLD AUTO: 6.6 K/UL — SIGNIFICANT CHANGE UP (ref 4.8–10.8)

## 2019-01-14 PROCEDURE — 80053 COMPREHEN METABOLIC PANEL: CPT

## 2019-01-14 PROCEDURE — 85610 PROTHROMBIN TIME: CPT

## 2019-01-14 PROCEDURE — 84484 ASSAY OF TROPONIN QUANT: CPT

## 2019-01-14 PROCEDURE — 70450 CT HEAD/BRAIN W/O DYE: CPT

## 2019-01-14 PROCEDURE — 83036 HEMOGLOBIN GLYCOSYLATED A1C: CPT

## 2019-01-14 PROCEDURE — 93005 ELECTROCARDIOGRAM TRACING: CPT

## 2019-01-14 PROCEDURE — 85027 COMPLETE CBC AUTOMATED: CPT

## 2019-01-14 PROCEDURE — 83735 ASSAY OF MAGNESIUM: CPT

## 2019-01-14 PROCEDURE — 84100 ASSAY OF PHOSPHORUS: CPT

## 2019-01-14 PROCEDURE — 70551 MRI BRAIN STEM W/O DYE: CPT

## 2019-01-14 PROCEDURE — 82962 GLUCOSE BLOOD TEST: CPT

## 2019-01-14 PROCEDURE — 97163 PT EVAL HIGH COMPLEX 45 MIN: CPT

## 2019-01-14 PROCEDURE — 99239 HOSP IP/OBS DSCHRG MGMT >30: CPT

## 2019-01-14 PROCEDURE — 71045 X-RAY EXAM CHEST 1 VIEW: CPT

## 2019-01-14 PROCEDURE — 70547 MR ANGIOGRAPHY NECK W/O DYE: CPT

## 2019-01-14 PROCEDURE — 70544 MR ANGIOGRAPHY HEAD W/O DYE: CPT

## 2019-01-14 PROCEDURE — 99285 EMERGENCY DEPT VISIT HI MDM: CPT

## 2019-01-14 PROCEDURE — 97167 OT EVAL HIGH COMPLEX 60 MIN: CPT

## 2019-01-14 PROCEDURE — 80061 LIPID PANEL: CPT

## 2019-01-14 PROCEDURE — 36415 COLL VENOUS BLD VENIPUNCTURE: CPT

## 2019-01-14 PROCEDURE — 99232 SBSQ HOSP IP/OBS MODERATE 35: CPT

## 2019-01-14 PROCEDURE — 80048 BASIC METABOLIC PNL TOTAL CA: CPT

## 2019-01-14 PROCEDURE — 82550 ASSAY OF CK (CPK): CPT

## 2019-01-14 RX ORDER — ASPIRIN AND DIPYRIDAMOLE 25; 200 MG/1; MG/1
1 CAPSULE, EXTENDED RELEASE ORAL
Qty: 0 | Refills: 0 | COMMUNITY
Start: 2019-01-14

## 2019-01-14 RX ORDER — PANTOPRAZOLE SODIUM 20 MG/1
1 TABLET, DELAYED RELEASE ORAL
Qty: 0 | Refills: 0 | COMMUNITY
Start: 2019-01-14

## 2019-01-14 RX ORDER — ATORVASTATIN CALCIUM 80 MG/1
1 TABLET, FILM COATED ORAL
Qty: 0 | Refills: 0 | COMMUNITY
Start: 2019-01-14

## 2019-01-14 RX ORDER — ASPIRIN AND DIPYRIDAMOLE 25; 200 MG/1; MG/1
1 CAPSULE, EXTENDED RELEASE ORAL
Qty: 60 | Refills: 0 | OUTPATIENT
Start: 2019-01-14 | End: 2019-02-12

## 2019-01-14 RX ORDER — METFORMIN HYDROCHLORIDE 850 MG/1
1 TABLET ORAL
Qty: 60 | Refills: 0 | OUTPATIENT
Start: 2019-01-14 | End: 2019-02-12

## 2019-01-14 RX ORDER — CLOPIDOGREL BISULFATE 75 MG/1
1 TABLET, FILM COATED ORAL
Qty: 30 | Refills: 0 | OUTPATIENT
Start: 2019-01-14 | End: 2019-02-12

## 2019-01-14 RX ORDER — OMEPRAZOLE 10 MG/1
1 CAPSULE, DELAYED RELEASE ORAL
Qty: 30 | Refills: 0 | OUTPATIENT
Start: 2019-01-14 | End: 2019-02-12

## 2019-01-14 RX ORDER — ATORVASTATIN CALCIUM 80 MG/1
1 TABLET, FILM COATED ORAL
Qty: 30 | Refills: 0 | OUTPATIENT
Start: 2019-01-14 | End: 2019-02-12

## 2019-01-14 RX ADMIN — Medication 650 MILLIGRAM(S): at 06:00

## 2019-01-14 RX ADMIN — PANTOPRAZOLE SODIUM 40 MILLIGRAM(S): 20 TABLET, DELAYED RELEASE ORAL at 05:07

## 2019-01-14 RX ADMIN — ASPIRIN AND DIPYRIDAMOLE 1 CAPSULE(S): 25; 200 CAPSULE, EXTENDED RELEASE ORAL at 05:07

## 2019-01-14 RX ADMIN — Medication 650 MILLIGRAM(S): at 05:07

## 2019-01-14 NOTE — PROGRESS NOTE ADULT - ASSESSMENT
48 year old female history of cva with aphasia and left sided weakness. Now with worsening aphasia that has resolved. Awaiting MRI  Patient has been noncompliant as an outpatient due to financial reasons. Increase lipitor and continue aggrenox.  1/13 Patient is anxious over left sided weakness and aphasia. Code stroke was called and ct scan and MRI confirmed no new stroke. I reassured her that there is no new stroke. She is dealing with the difficulty of the old stroke. I recommended a splint for her left hand and continuing therapy.   1/14 Stable for discharge with outpatient follow up.

## 2019-01-14 NOTE — PROGRESS NOTE ADULT - PROBLEM SELECTOR PROBLEM 4
Type 2 diabetes mellitus with diabetic polyneuropathy, without long-term current use of insulin

## 2019-01-14 NOTE — PROGRESS NOTE ADULT - PROBLEM SELECTOR PLAN 1
Neuro input appreciated. She has a history of cva with persistent symptoms. Her new symptom diplopia has resolved. MRI findings noted. No new findings.

## 2019-01-14 NOTE — PROGRESS NOTE ADULT - PROVIDER SPECIALTY LIST ADULT
Family Medicine
Hospitalist
Neurology
Hospitalist
Neurology

## 2019-01-14 NOTE — PROGRESS NOTE ADULT - SUBJECTIVE AND OBJECTIVE BOX
[facility]                                        Neurology at [place]                                 Richie Rasmussen, & Steve                                  [address]                                        [place], [place], [address]                                             [phone]        CC: CVA    HPI:   The patient is a 48y Female who presented with double vision that has since resolved.    She says that the night prior to arrival she saw two of everything. This has now improved. She says she has history of multiple CVA in past and she has baseline aphasia and left sided weakness from it.  She also has right hemianopsia for the past year or so.  She has had an occlusion of left ICA/MCA as seen on MRA in 2017.   She has not followed with neurology.  She has not taken medications for blood pressure and cholesterol for several weeks due to financial reasons.     Interim history:  No further diplopia.   ROS:   Denies headache or dizziness.  Denies chest pain.  Denies shortness of breath.    MEDICATIONS  (STANDING):  atorvastatin 40 milliGRAM(s) Oral at bedtime  dextrose 5%. 1000 milliLiter(s) (50 mL/Hr) IV Continuous <Continuous>  dipyridamole 200 mG/aspirin 25 mG 1 Capsule(s) Oral two times a day  enoxaparin Injectable 40 milliGRAM(s) SubCutaneous every 24 hours  insulin lispro (HumaLOG) corrective regimen sliding scale   SubCutaneous three times a day before meals  insulin lispro (HumaLOG) corrective regimen sliding scale   SubCutaneous at bedtime  pantoprazole    Tablet 40 milliGRAM(s) Oral before breakfast      Vital Signs Last 24 Hrs  T(C): 36.8 (14 Jan 2019 03:15), Max: 36.8 (13 Jan 2019 16:00)  T(F): 98.3 (14 Jan 2019 03:15), Max: 98.3 (14 Jan 2019 03:15)  HR: 70 (14 Jan 2019 08:50) (70 - 72)  BP: 115/68 (14 Jan 2019 08:50) (115/68 - 142/82)  BP(mean): 93 (14 Jan 2019 03:15) (83 - 93)  RR: 16 (14 Jan 2019 08:50) (14 - 17)  SpO2: 98% (14 Jan 2019 03:15) (98% - 100%)    Detailed Neurologic Exam:    Mental status: The patient is awake and alert. There is no dysarthria. She has some difficulty with naming objects and repeating. She can follow instructions. She has a labile affect and appears anxious at times and tearful at other times.     Cranial nerves: Pupils equal and react symmetrically to light. There is a right homonymous visual field deficit. Extraocular motion is full with no nystagmus. There is no ptosis. Facial sensation is intact. Facial musculature is symmetric. Palate elevates symmetrically. Tongue is midline.    Motor: There is normal bulk.  There is no tremor. There is some increased tone on the left.  Strength grossly 5/5 on the right side.  Strength is 2/5 in the left arm and 3/5 in the left leg.    Sensation: Grossly intact to light touch and pin.    Reflexes: 1+ throughout and plantar responses are flexor.    Cerebellar: No dysmetria on finger nose testing on the right. The left cannot be tested.    Labs:     01-13    139  |  106  |  12.0  ----------------------------<  150<H>  4.0   |  22.0  |  0.52    Ca    8.9      13 Jan 2019 11:14    TPro  7.0  /  Alb  4.1  /  TBili  0.3<L>  /  DBili  x   /  AST  17  /  ALT  18  /  AlkPhos  55  01-13                        10.5   0.1   )-----------( 182      ( 13 Jan 2019 11:14 )             35.5       Rad:   ***

## 2019-01-14 NOTE — PROGRESS NOTE ADULT - ASSESSMENT
48y Female who is followed by neurology because of diplopia    Diplopia  Resolved  Likely TIA.  Not compliant with meds for financial reasons.    "Code stroke on 1/13/19"  Clearly when this was called, the neurology notes were not reviewed as the speech difficulty and left sided findings have been present since admission.   This is currently at baseline.     Stroke (prior)  Due to occluded ICA/MCA  Continue Aggrenox and increase Lipitor to 40 mg daily  Speech therapy.  PT/OT.    Hypertension   Control blood pressure.    Discharge planning.   Patient does not wish to go to rehab.

## 2019-01-14 NOTE — PROGRESS NOTE ADULT - SUBJECTIVE AND OBJECTIVE BOX
ALEX RAINEYIE     Chief Complaint: Patient is a 48y old  Female who presents with a chief complaint of Blurry vision (14 Jan 2019 09:24)      PAST MEDICAL & SURGICAL HISTORY:  Cerebrovascular accident (CVA), unspecified mechanism  Acid reflux  HTN (hypertension)  Diabetes  No significant past surgical history      HPI/OVERNIGHT EVENTS: Patient in no distress. I spoke with Dr Quezada. She may take plavix rather than aggrenox secondary to cost.    MEDICATIONS  (STANDING):  atorvastatin 40 milliGRAM(s) Oral at bedtime  dextrose 5%. 1000 milliLiter(s) (50 mL/Hr) IV Continuous <Continuous>  dextrose 50% Injectable 12.5 Gram(s) IV Push once  dextrose 50% Injectable 25 Gram(s) IV Push once  dextrose 50% Injectable 25 Gram(s) IV Push once  dipyridamole 200 mG/aspirin 25 mG 1 Capsule(s) Oral two times a day  enoxaparin Injectable 40 milliGRAM(s) SubCutaneous every 24 hours  insulin lispro (HumaLOG) corrective regimen sliding scale   SubCutaneous three times a day before meals  insulin lispro (HumaLOG) corrective regimen sliding scale   SubCutaneous at bedtime  pantoprazole    Tablet 40 milliGRAM(s) Oral before breakfast      Vital Signs Last 24 Hrs  T(C): 36.8 (14 Jan 2019 03:15), Max: 36.8 (13 Jan 2019 16:00)  T(F): 98.3 (14 Jan 2019 03:15), Max: 98.3 (14 Jan 2019 03:15)  HR: 70 (14 Jan 2019 08:50) (70 - 72)  BP: 115/68 (14 Jan 2019 08:50) (115/68 - 142/82)  BP(mean): 93 (14 Jan 2019 03:15) (83 - 93)  RR: 16 (14 Jan 2019 08:50) (14 - 17)  SpO2: 98% (14 Jan 2019 03:15) (98% - 100%)    PHYSICAL EXAM:  HEENT: PERRLA, EOMI, Normal Hearing  Neck: No LAD, No JVD  Back: No CVA tenderness  Respiratory: CTAB Cardiovascular: S1 and S2, RRR, no M/G/R  Gastrointestinal: BS+, soft, NT/ND  Extremities: No peripheral edema  Vascular: 2+ peripheral pulses  Neurological:  left sided weakness and aphasia        CAPILLARY BLOOD GLUCOSE    LABS:                        10.5   0.1   )-----------( 182      ( 13 Jan 2019 11:14 )             35.5     01-13    139  |  106  |  12.0  ----------------------------<  150<H>  4.0   |  22.0  |  0.52    Ca    8.9      13 Jan 2019 11:14    TPro  7.0  /  Alb  4.1  /  TBili  0.3<L>  /  DBili  x   /  AST  17  /  ALT  18  /  AlkPhos  55  01-13    PT/INR - ( 13 Jan 2019 11:14 )   PT: 11.9 sec;   INR: 1.03 ratio               RADIOLOGY & ADDITIONAL TESTS:

## 2019-01-14 NOTE — PROGRESS NOTE ADULT - REASON FOR ADMISSION
Blurry vision

## 2019-02-04 ENCOUNTER — OUTPATIENT (OUTPATIENT)
Dept: OUTPATIENT SERVICES | Facility: HOSPITAL | Age: 49
LOS: 1 days | End: 2019-02-04
Payer: COMMERCIAL

## 2019-02-04 DIAGNOSIS — I63.9 CEREBRAL INFARCTION, UNSPECIFIED: ICD-10-CM

## 2019-02-04 DIAGNOSIS — Z51.89 ENCOUNTER FOR OTHER SPECIFIED AFTERCARE: ICD-10-CM

## 2019-02-04 DIAGNOSIS — R26.89 OTHER ABNORMALITIES OF GAIT AND MOBILITY: ICD-10-CM

## 2019-02-04 DIAGNOSIS — R27.9 UNSPECIFIED LACK OF COORDINATION: ICD-10-CM

## 2019-02-12 PROCEDURE — 97163 PT EVAL HIGH COMPLEX 45 MIN: CPT

## 2019-02-12 PROCEDURE — 97110 THERAPEUTIC EXERCISES: CPT

## 2019-02-12 PROCEDURE — 97112 NEUROMUSCULAR REEDUCATION: CPT

## 2019-02-12 PROCEDURE — 97116 GAIT TRAINING THERAPY: CPT

## 2019-02-12 PROCEDURE — 82962 GLUCOSE BLOOD TEST: CPT

## 2019-02-21 ENCOUNTER — INPATIENT (INPATIENT)
Facility: HOSPITAL | Age: 49
LOS: 5 days | Discharge: STILL ADMITTED OR ADMINISTRATIVE | DRG: 65 | End: 2019-02-27
Attending: INTERNAL MEDICINE | Admitting: HOSPITALIST
Payer: COMMERCIAL

## 2019-02-21 VITALS
DIASTOLIC BLOOD PRESSURE: 84 MMHG | SYSTOLIC BLOOD PRESSURE: 220 MMHG | TEMPERATURE: 98 F | RESPIRATION RATE: 16 BRPM | HEART RATE: 67 BPM | OXYGEN SATURATION: 100 %

## 2019-02-21 DIAGNOSIS — F32.9 MAJOR DEPRESSIVE DISORDER, SINGLE EPISODE, UNSPECIFIED: ICD-10-CM

## 2019-02-21 DIAGNOSIS — I63.9 CEREBRAL INFARCTION, UNSPECIFIED: ICD-10-CM

## 2019-02-21 DIAGNOSIS — F05 DELIRIUM DUE TO KNOWN PHYSIOLOGICAL CONDITION: ICD-10-CM

## 2019-02-21 LAB
ALBUMIN SERPL ELPH-MCNC: 5.2 G/DL — SIGNIFICANT CHANGE UP (ref 3.3–5.2)
ALP SERPL-CCNC: 70 U/L — SIGNIFICANT CHANGE UP (ref 40–120)
ALT FLD-CCNC: 18 U/L — SIGNIFICANT CHANGE UP
ANION GAP SERPL CALC-SCNC: 14 MMOL/L — SIGNIFICANT CHANGE UP (ref 5–17)
ANISOCYTOSIS BLD QL: SLIGHT — SIGNIFICANT CHANGE UP
APTT BLD: 31.2 SEC — SIGNIFICANT CHANGE UP (ref 27.5–36.3)
AST SERPL-CCNC: 16 U/L — SIGNIFICANT CHANGE UP
BASOPHILS # BLD AUTO: 0 K/UL — SIGNIFICANT CHANGE UP (ref 0–0.2)
BASOPHILS NFR BLD AUTO: 0.3 % — SIGNIFICANT CHANGE UP (ref 0–2)
BILIRUB SERPL-MCNC: 0.3 MG/DL — LOW (ref 0.4–2)
BUN SERPL-MCNC: 14 MG/DL — SIGNIFICANT CHANGE UP (ref 8–20)
CALCIUM SERPL-MCNC: 9.9 MG/DL — SIGNIFICANT CHANGE UP (ref 8.6–10.2)
CHLORIDE SERPL-SCNC: 102 MMOL/L — SIGNIFICANT CHANGE UP (ref 98–107)
CO2 SERPL-SCNC: 25 MMOL/L — SIGNIFICANT CHANGE UP (ref 22–29)
CREAT SERPL-MCNC: 0.53 MG/DL — SIGNIFICANT CHANGE UP (ref 0.5–1.3)
DACRYOCYTES BLD QL SMEAR: SLIGHT — SIGNIFICANT CHANGE UP
ELLIPTOCYTES BLD QL SMEAR: SLIGHT — SIGNIFICANT CHANGE UP
EOSINOPHIL # BLD AUTO: 0.1 K/UL — SIGNIFICANT CHANGE UP (ref 0–0.5)
EOSINOPHIL NFR BLD AUTO: 1.2 % — SIGNIFICANT CHANGE UP (ref 0–6)
ETHANOL SERPL-MCNC: <10 MG/DL — SIGNIFICANT CHANGE UP
GLUCOSE BLDC GLUCOMTR-MCNC: 115 MG/DL — HIGH (ref 70–99)
GLUCOSE SERPL-MCNC: 142 MG/DL — HIGH (ref 70–115)
HCT VFR BLD CALC: 38.9 % — SIGNIFICANT CHANGE UP (ref 37–47)
HGB BLD-MCNC: 11.8 G/DL — LOW (ref 12–16)
HYPOCHROMIA BLD QL: SLIGHT — SIGNIFICANT CHANGE UP
INR BLD: 1.02 RATIO — SIGNIFICANT CHANGE UP (ref 0.88–1.16)
LYMPHOCYTES # BLD AUTO: 1.9 K/UL — SIGNIFICANT CHANGE UP (ref 1–4.8)
LYMPHOCYTES # BLD AUTO: 25.3 % — SIGNIFICANT CHANGE UP (ref 20–55)
MACROCYTES BLD QL: SLIGHT — SIGNIFICANT CHANGE UP
MCHC RBC-ENTMCNC: 22.6 PG — LOW (ref 27–31)
MCHC RBC-ENTMCNC: 30.3 G/DL — LOW (ref 32–36)
MCV RBC AUTO: 74.4 FL — LOW (ref 81–99)
MICROCYTES BLD QL: SLIGHT — SIGNIFICANT CHANGE UP
MONOCYTES # BLD AUTO: 0.4 K/UL — SIGNIFICANT CHANGE UP (ref 0–0.8)
MONOCYTES NFR BLD AUTO: 4.8 % — SIGNIFICANT CHANGE UP (ref 3–10)
NEUTROPHILS # BLD AUTO: 5.1 K/UL — SIGNIFICANT CHANGE UP (ref 1.8–8)
NEUTROPHILS NFR BLD AUTO: 68.3 % — SIGNIFICANT CHANGE UP (ref 37–73)
OVALOCYTES BLD QL SMEAR: SLIGHT — SIGNIFICANT CHANGE UP
PLAT MORPH BLD: NORMAL — SIGNIFICANT CHANGE UP
PLATELET # BLD AUTO: 362 K/UL — SIGNIFICANT CHANGE UP (ref 150–400)
POIKILOCYTOSIS BLD QL AUTO: SLIGHT — SIGNIFICANT CHANGE UP
POTASSIUM SERPL-MCNC: 4.2 MMOL/L — SIGNIFICANT CHANGE UP (ref 3.5–5.3)
POTASSIUM SERPL-SCNC: 4.2 MMOL/L — SIGNIFICANT CHANGE UP (ref 3.5–5.3)
PROT SERPL-MCNC: 8.7 G/DL — SIGNIFICANT CHANGE UP (ref 6.6–8.7)
PROTHROM AB SERPL-ACNC: 11.7 SEC — SIGNIFICANT CHANGE UP (ref 10–12.9)
RBC # BLD: 5.23 M/UL — HIGH (ref 4.4–5.2)
RBC # FLD: 18.1 % — HIGH (ref 11–15.6)
RBC BLD AUTO: ABNORMAL
SODIUM SERPL-SCNC: 141 MMOL/L — SIGNIFICANT CHANGE UP (ref 135–145)
WBC # BLD: 7.4 K/UL — SIGNIFICANT CHANGE UP (ref 4.8–10.8)
WBC # FLD AUTO: 7.4 K/UL — SIGNIFICANT CHANGE UP (ref 4.8–10.8)

## 2019-02-21 PROCEDURE — 70498 CT ANGIOGRAPHY NECK: CPT | Mod: 26

## 2019-02-21 PROCEDURE — 99223 1ST HOSP IP/OBS HIGH 75: CPT

## 2019-02-21 PROCEDURE — 99285 EMERGENCY DEPT VISIT HI MDM: CPT

## 2019-02-21 PROCEDURE — 93010 ELECTROCARDIOGRAM REPORT: CPT

## 2019-02-21 PROCEDURE — 90792 PSYCH DIAG EVAL W/MED SRVCS: CPT

## 2019-02-21 PROCEDURE — 70551 MRI BRAIN STEM W/O DYE: CPT | Mod: 26

## 2019-02-21 PROCEDURE — 70496 CT ANGIOGRAPHY HEAD: CPT | Mod: 26

## 2019-02-21 RX ORDER — DEXTROSE 50 % IN WATER 50 %
15 SYRINGE (ML) INTRAVENOUS ONCE
Qty: 0 | Refills: 0 | Status: DISCONTINUED | OUTPATIENT
Start: 2019-02-21 | End: 2019-02-27

## 2019-02-21 RX ORDER — ENOXAPARIN SODIUM 100 MG/ML
40 INJECTION SUBCUTANEOUS DAILY
Qty: 0 | Refills: 0 | Status: DISCONTINUED | OUTPATIENT
Start: 2019-02-21 | End: 2019-02-27

## 2019-02-21 RX ORDER — INSULIN LISPRO 100/ML
VIAL (ML) SUBCUTANEOUS
Qty: 0 | Refills: 0 | Status: DISCONTINUED | OUTPATIENT
Start: 2019-02-21 | End: 2019-02-27

## 2019-02-21 RX ORDER — ATORVASTATIN CALCIUM 80 MG/1
40 TABLET, FILM COATED ORAL AT BEDTIME
Qty: 0 | Refills: 0 | Status: DISCONTINUED | OUTPATIENT
Start: 2019-02-21 | End: 2019-02-27

## 2019-02-21 RX ORDER — CLOPIDOGREL BISULFATE 75 MG/1
75 TABLET, FILM COATED ORAL DAILY
Qty: 0 | Refills: 0 | Status: DISCONTINUED | OUTPATIENT
Start: 2019-02-21 | End: 2019-02-27

## 2019-02-21 RX ORDER — DEXTROSE 50 % IN WATER 50 %
25 SYRINGE (ML) INTRAVENOUS ONCE
Qty: 0 | Refills: 0 | Status: DISCONTINUED | OUTPATIENT
Start: 2019-02-21 | End: 2019-02-27

## 2019-02-21 RX ORDER — ACETAMINOPHEN 500 MG
975 TABLET ORAL ONCE
Qty: 0 | Refills: 0 | Status: COMPLETED | OUTPATIENT
Start: 2019-02-21 | End: 2019-02-21

## 2019-02-21 RX ORDER — GLUCAGON INJECTION, SOLUTION 0.5 MG/.1ML
1 INJECTION, SOLUTION SUBCUTANEOUS ONCE
Qty: 0 | Refills: 0 | Status: DISCONTINUED | OUTPATIENT
Start: 2019-02-21 | End: 2019-02-27

## 2019-02-21 RX ORDER — ACETAMINOPHEN 500 MG
650 TABLET ORAL EVERY 6 HOURS
Qty: 0 | Refills: 0 | Status: DISCONTINUED | OUTPATIENT
Start: 2019-02-21 | End: 2019-02-27

## 2019-02-21 RX ORDER — ASPIRIN/CALCIUM CARB/MAGNESIUM 324 MG
TABLET ORAL
Qty: 0 | Refills: 0 | Status: DISCONTINUED | OUTPATIENT
Start: 2019-02-21 | End: 2019-02-21

## 2019-02-21 RX ORDER — SODIUM CHLORIDE 9 MG/ML
1000 INJECTION, SOLUTION INTRAVENOUS
Qty: 0 | Refills: 0 | Status: DISCONTINUED | OUTPATIENT
Start: 2019-02-21 | End: 2019-02-27

## 2019-02-21 RX ORDER — DEXTROSE 50 % IN WATER 50 %
12.5 SYRINGE (ML) INTRAVENOUS ONCE
Qty: 0 | Refills: 0 | Status: DISCONTINUED | OUTPATIENT
Start: 2019-02-21 | End: 2019-02-27

## 2019-02-21 RX ORDER — DIAZEPAM 5 MG
5 TABLET ORAL ONCE
Qty: 0 | Refills: 0 | Status: DISCONTINUED | OUTPATIENT
Start: 2019-02-21 | End: 2019-02-21

## 2019-02-21 RX ADMIN — Medication 975 MILLIGRAM(S): at 13:19

## 2019-02-21 RX ADMIN — ATORVASTATIN CALCIUM 40 MILLIGRAM(S): 80 TABLET, FILM COATED ORAL at 22:16

## 2019-02-21 RX ADMIN — Medication 975 MILLIGRAM(S): at 12:47

## 2019-02-21 RX ADMIN — Medication 5 MILLIGRAM(S): at 16:29

## 2019-02-21 NOTE — ED ADULT NURSE NOTE - CHIEF COMPLAINT QUOTE
Patient BIBA, patient presents confused, not moving left arm, stating blindness in left arm, hx of strokes in past, no family or witnesses with patient, unknown last known well, patient picked up at [facility], unknown how patient arrived there. Dr Yoo at bedside upon arrival

## 2019-02-21 NOTE — ED PROVIDER NOTE - PHYSICAL EXAMINATION
Gen: anxious, tearful, AOx1- person confused to location/date  Head: NCAT  HEENT: PERRL, oral mucosa moist, normal conjunctiva, neck supple  Lung: CTAB, no respiratory distress  CV: rrr, no murmur, Normal perfusion  Abd: soft, NTND  MSK: No edema, no visible deformities  Neuro: 2/5 strength Lt side, +extinction left side with decreased sensation, +eom palsy,slight aphasia, no dysarthria. no facial asymmetry   Skin: No rash   Psych: normal affect Gen: anxious, tearful, AOx1- person confused to location/date  Head: NCAT  HEENT: PERRL, oral mucosa moist, normal conjunctiva, neck supple  Lung: CTAB, no respiratory distress  CV: rrr, no murmur, Normal perfusion  Abd: soft, NTND  MSK: No edema, no visible deformities  Neuro: 2/5 strength Lt side, +extinction left side with decreased sensation, +eom palsy, slight aphasia, slight dysarthria. no facial asymmetry   Skin: No rash   Psych: normal affect

## 2019-02-21 NOTE — ED PROVIDER NOTE - OBJECTIVE STATEMENT
47yo F with worsening double vision/vision loss and Lt sided weakness. has old strokes buta at this time unable to give further details. states sx started when she woke up, went to bed fine, has been getting worse since waking up. was picked up outside court house. per EMS slightly worsening aphasia en route. no change in mental status

## 2019-02-21 NOTE — ED ADULT NURSE NOTE - NEURO BEHAVIOR
HISTORY OF PRESENT ILLNESS:  In short, the patient is 43-year-old 

male with long debilitating history of polysubstance abuse and dependence,

most likely patient has schizoaffective disorder bipolar type.  Patient has

multiple psychiatric admissions.  Patient was discharged against medical

advice on 06/14/2018, from [place].  Patient was admitted on the medical

side on 06/16/2018, for possible osteomyelitis and infection on his right

foot.  Psych consult was called for medication management.  Patient has

long history of mental illness as well as polysubstance abuse and

dependence.  Patient was seen and examined today.  Patient presented to be

alert and oriented, presented very well to compare with the previous

admission.  Patient said that he did not relapse on any drugs and he feels

great.  Patient reported that he feels good on the medication what this

writer prescribed to this patient.  Patient reported that the only problem

right now is his slurred speech.  This writer suggested to change Seroquel

extended release to the nighttime and also Cogentin was decreased in doses.

Patient also complained of the right foot pain and patient was asking what

not addictive medication could be given to him.  This writer will increase

the dose of Mobic.  Patient was discharged from the psychiatric inpatient

unit on 7.5 mg daily, right now it will be 15 mg.  Patient denied being

depressed.  Patient has future-oriented plans.  Patient wants to get better

before his kids, the patient has 3 kids.  Patient reported that family is

supportive right now.  Patient's mother and patient's sister involved into

the patient care.  Please see previous admission to the psychiatric

inpatient unit note for more detailed information.



OBJECTIVE:

VITAL SIGNS:  Seem to be stable.  Patient's temperature 98.5, pulse is 91,

blood pressure 138/84, respirations 26, oxygen saturation is 98%.



MEDICATIONS:  Reviewed.  The patient is on Cogentin 1 mg twice a day at the

morning time and at the nighttime, Lotrimin, Depakote 500 mg twice a day at

the morning time and at the nighttime, Colace, Prozac 40 mg daily,

Neurontin 600 mg 4 times a day, heparin.  Patient is on Toradol.  Mobic was

increased to 15 mg daily.  Patient is on morphine 10 mg IV push every 6

hours as needed.  Protonix, MiraLax, Seroquel 800 mg at the nighttime

scheduled, vancomycin, and Ambien as needed for insomnia.



LABORATORY DATA:  Reviewed.  Hemoglobin and hematocrit 13.1 and 38.6. 

Coagulation reviewed.  Blood gas reviewed.  Chemistry reviewed.  Urinalysis

reviewed.  Toxicology was negative for any substances.  This writer would

like to emphasize the fact that patient is off benzodiazepines and pain

killers, morphine only if needed for the patient because patient has long

history of addiction to pain killers as well as opioids as well as cocaine

as well as marijuana as well as benzodiazepines.



MENTAL STATUS EXAMINATION:  Patient presented to be calm, cooperative, good

personal hygiene.  Affect is brighter.  Patient is more coherent and

thought process was goal-directed.  Good eye contact.  Mood described "I

feel better.  Thank you very much for your help."  Thought content:  The

patient denied visual, auditory, and tactile hallucinations.  Denied

paranoid ideation.  Patient presented more coherent and not psychotic to

compare with the previous admission when patient would be very disorganized

as well as feeling that his wife is cheating on him and stalking him on the

Internet and Facebook.  There are no such symptoms observed today.  Insight

and judgment seem to be improving.  Impulses are well controlled.  As per

collateral information, patient is compliant with the medication.  No

disrespectful or agitated behavior.  Patient is polite and cooperative.



IMPRESSION:  As per history, most likely patient has schizoaffective

disorder versus bipolar disorder with psychosis versus substance-induced

mood disorder, history of polysubstance abuse and dependence.



PLAN:  Continue current medication.  Continue current management.  Patient

adamantly denied thoughts of harming himself or others.  Denied intents or

plan.  The patient will be continued on Seroquel 800 mg extended release at

the nighttime.  The patient also will be continued on Ambien at the

nighttime.  Mobic was increased to 15 mg daily, Prozac should be continued.

Please try to avoid benzodiazepines as well as pain killers.  Should you

have any questions, give me a call back.  Patient presented very well.  No

imminent danger to self or others, but this writer will follow up on this

patient tomorrow to make sure that the patient tolerates medications well

and adjustment of the meds.







__________________________________________

Jennifer Corey MD



DD:  06/19/2018 11:32:59

DT:  06/19/2018 11:36:06

Job # [id number] agitated/anxious

## 2019-02-21 NOTE — PHYSICAL THERAPY INITIAL EVALUATION ADULT - PLANNED THERAPY INTERVENTIONS, PT EVAL
neuromuscular re-education/ROM/stretching/bed mobility training/strengthening/balance training/transfer training

## 2019-02-21 NOTE — PHYSICAL THERAPY INITIAL EVALUATION ADULT - ADDITIONAL COMMENTS
It is unclear the patients previous functional level. pt following commands but unable to report where she lives, and her date of birth.

## 2019-02-21 NOTE — ED PROVIDER NOTE - CLINICAL SUMMARY MEDICAL DECISION MAKING FREE TEXT BOX
urgent need for intervention: patient with unknown last known well, patient poor historian, unsure what old deficits compared to new deficits. will get CT head and CTA priority. neurology consult. if LVO transfer to [place].

## 2019-02-21 NOTE — BEHAVIORAL HEALTH ASSESSMENT NOTE - HPI (INCLUDE ILLNESS QUALITY, SEVERITY, DURATION, TIMING, CONTEXT, MODIFYING FACTORS, ASSOCIATED SIGNS AND SYMPTOMS)
47y/o Female with Hx of DM, HTN, CVA, dysarthrias and diplopia, large left MCA territory infarct biba from [facility] for change in mental status with worsening vision, speech and unilateral weakness. Cat of brain was negative for acute infarct. Patient is admitted medically and is pending MRI.  Psychiatry consulted for somatoform disorder.    Patient is currently A&Ox1. She is not able to give HPI and demonstrates expressive aphasia. Patient denies recent or current S/H/I/I/P, or  A/V/H. She acknowledges she was at court today but is not able to state charges. Case discussed with ED attending who reports patient's neurologic symptoms appear to be waxing and waning while in ED. She reports severe vision impairment and was noted to use television remote.     Received collateral from Care Manager, Chantal Solano [phone] at [facility] for mental Health and Wellness. Patient was referred to this agency for physical disabilities. Chantal reports patient is not in current mental health treatment with her program but has a h/o of depression and Crack./ Cocaine abuse. Today while at court patient became aphasic and fell off a bench. Patient was at court today for a continuation hearing regarding drug charges from 2017. As per Chantal patient was not facing imprisonment today. This was Chantal's first in-person meeting with patient however, they have had several previous  phone conversations. At baseline Chantal reports patient is able to engage in verbal conversation with minor speech deficits, left sided weakness and left vision impairment. Chantal denies patient has recently verbalized feeling depressed,  S/H/I/I/P, or A/V/H. Patient has received Zoloft in the past however, it is not clear if she is currently taking this medication.

## 2019-02-21 NOTE — H&P ADULT - ASSESSMENT
47y/o Female with Hx of DM, HTN, CVA, dysarthrias and diplopia, she has prior history of strokes in the past including large left PCA territory infarct comes in with worsening of diffuclity speaking, worsening double vision as well as worsening left sided weakness being admitted for r/o new stroke.     Plan:     Hx of stroke r/o new stroke: Will admit under medicine on stroke unit, will do Neuro checks, will cover one eye, will resume her plavis and statins, MRI ordered, Neurology consult appreciated, will get lipid panel, TSH as well Hb a1c, PT, OT, speech and swallow eval.     Hx of DM: FS monitoring and coverage insulin.     HLD: Continue with statin.     DVT prophylaxis: lovenox sc.      on board.

## 2019-02-21 NOTE — H&P ADULT - NSHPPHYSICALEXAM_GEN_ALL_CORE
Vital Signs Last 24 Hrs  T(C): 36.5 (21 Feb 2019 10:04), Max: 36.5 (21 Feb 2019 10:04)  T(F): 97.7 (21 Feb 2019 10:04), Max: 97.7 (21 Feb 2019 10:04)  HR: 72 (21 Feb 2019 12:13) (67 - 73)  BP: 138/83 (21 Feb 2019 12:13) (138/83 - 220/84)  BP(mean): --  RR: 18 (21 Feb 2019 12:13) (16 - 18)  SpO2: 96% (21 Feb 2019 12:13) (96% - 100%)    PHYSICAL EXAM:    GENERAL: Elderly male looking   HEENT: PERRL, +EOMI  NECK: soft, Supple, No JVD,   CHEST/LUNG: Clear to auscultate bilaterally; No wheezing  HEART: S1S2+, Regular rate and rhythm; No murmurs, rubs, or gallops  ABDOMEN: Soft, Nontender, Nondistended; Bowel sounds present  EXTREMITIES:  2+ Peripheral Pulses, No clubbing, cyanosis, or edema  SKIN: No rashes or lesions  NEURO: AAOX3, no focal deficits, no motor r sensory loss  PSYCH: normal mood Vital Signs Last 24 Hrs  T(C): 36.5 (21 Feb 2019 10:04), Max: 36.5 (21 Feb 2019 10:04)  T(F): 97.7 (21 Feb 2019 10:04), Max: 97.7 (21 Feb 2019 10:04)  HR: 72 (21 Feb 2019 12:13) (67 - 73)  BP: 138/83 (21 Feb 2019 12:13) (138/83 - 220/84)  RR: 18 (21 Feb 2019 12:13) (16 - 18)  SpO2: 96% (21 Feb 2019 12:13) (96% - 100%)    PHYSICAL EXAM:    GENERAL: Middle age female looking anxious  HEENT: Atraumatic   NECK: soft, Supple, No JVD,   CHEST/LUNG: Clear to auscultate bilaterally; No wheezing  HEART: S1S2+, Regular rate and rhythm; No murmurs  ABDOMEN: Soft, Nontender, Nondistended; Bowel sounds present  EXTREMITIES:  2+ Peripheral Pulses, No edema  SKIN: No rashes or lesions  NEURO: AAOX2, dysarthric, she has double vision if she open both eye, gets corrected with closing either of eye and if she is  seeing with one eye,  right eye is deviated externally, eyes do not move in any direction to command, she has some asymmetrical Facial, patient has weakness on the left side 2/5, with rigidity and increase tone, right side is moving with power 5/5, Sensation: Intact to touch    PSYCH: Anxious mood

## 2019-02-21 NOTE — BEHAVIORAL HEALTH ASSESSMENT NOTE - SUMMARY
47y/o AA Female with PPH of depression and Cocaine and ETOH abuse, previous arrest, PMH of DM, HTN, CVA, dysarthrias and diplopia, large left MCA territory infarct,  biba from [facility] for acute change in mental status with worsening vision, speech and unilateral weakness. Cat of brain was negative for acute infarct. Psychiatry consulted for somatoform disorder.  Patient is currently A&Ox1 with expressive aphasia. She is not able to provide HPI but acknowledges she was biba from [facility] for worsening stroke symptoms. Can not r/o current symptoms are not caused by a medical or neurological condition. Psychiatry will continue to follow.

## 2019-02-21 NOTE — H&P ADULT - HISTORY OF PRESENT ILLNESS
47y/o Female with Hx of DM, HTN, CVA, dysarthrias and diplopia in the past was recently here last month with similar symptoms,  with a prior history of strokes in the past including large left PCA territory infarct. She was seen on prior admission in January of 2019 when she presented with diplopia and was noted to have baseline left hemiparesis and speech difficulty as well as visual findings. She had reportedly been poorly compliant with her medications for several weeks prior to that admission due to what was described as "financial reasons."  MRI showed no acute infarct and she was ultimately discharged.     She now returns after meeting her  (Chantal Solano [phone]) in court this morning.   The  reports that she was appearing due to charges dating back to 2017.  She had reported worsening vision.   This was reportedly noted on awakening this morning but the patient is unable to describe this further.   She now describes double vision but also decreased vision overall.   She continues to have left sided weakness but reports that it is now worse. 47y/o Female with Hx of DM, HTN, CVA, dysarthrias and diplopia, she has prior history of strokes in the past including large left PCA territory infarct. She was seen on prior admission in January of 2019 when she presented with diplopia and was noted to have baseline left hemiparesis and speech difficulty as well as visual findings., She had reportedly been poorly compliant with her medications for several weeks prior to that admission due to what was described as "financial reasons." during the recent admission MRI showed no acute infarct and she was ultimately discharged.   she is poor Historian, Hx is mostly obtained from the Chart, She now returns after meeting her  (Chantal Solano [phone]) in court this morning, The  reports that she was appearing due to charges dating back to 2017, She had reported worsening vision,  She now describes double vision but also decreased vision overall along with left sided weakness.

## 2019-02-21 NOTE — CONSULT NOTE ADULT - ASSESSMENT
The patient is a 48y Female with prior stroke and left spastic hemiparesis as well as baseline speech and visual difficulty now presenting with worsening of all symptoms that began this morning.     CVA  Unclear if the current presentation represents new CVA, worsening of prior CVA, or is related to behavioral issue.   Agree with plan for MRI brain to assess for new CVA.  Will check echocardiogram as does not seem to have been done last admission.   Continue antiplatelet and statin.     Lipid  Repeat fasting lipid panel.   Statin as above.     Diabetes Mellitus   Follow blood glucose.     Behavioral health  Recommend behavioral health evaluation.   Unclear if there is a component of secondary gain to her presentation as she presented from court according to her  (Chantal Solano [phone]).  Agree with checking urine toxicology if specimen can be obtained.     Case discussed with Dr Yoo (ER attending).

## 2019-02-21 NOTE — PHYSICAL THERAPY INITIAL EVALUATION ADULT - MANUAL MUSCLE TESTING RESULTS, REHAB EVAL
right UE/LE 5/5 left UE/LE unable to assess. Pt with rigidity to left UE/LE not demonstrating movement when asked.

## 2019-02-21 NOTE — ED ADULT NURSE NOTE - ED STAT RN HANDOFF WHERE
Topical Retinoid counseling:  Patient advised to apply a pea-sized amount only at bedtime and wait 30 minutes after washing their face before applying. If too drying, patient may add a non-comedogenic moisturizer. The patient verbalized understanding of the proper use and possible adverse effects of retinoids. All of the patient's questions and concerns were addressed. Topical Sulfur Applications Counseling: Topical Sulfur Counseling: Patient counseled that this medication may cause skin irritation or allergic reactions. In the event of skin irritation, the patient was advised to reduce the amount of the drug applied or use it less frequently. The patient verbalized understanding of the proper use and possible adverse effects of topical sulfur application. All of the patient's questions and concerns were addressed. Arava Counseling:  Patient counseled regarding adverse effects of Arava including but not limited to nausea, vomiting, abnormalities in liver function tests. Patients may develop mouth sores, rash, diarrhea, and abnormalities in blood counts. The patient understands that monitoring is required including LFTs and blood counts. There is a rare possibility of scarring of the liver and lung problems that can occur when taking methotrexate. Persistent nausea, loss of appetite, pale stools, dark urine, cough, and shortness of breath should be reported immediately. Patient advised to discontinue Arava treatment and consult with a physician prior to attempting conception. The patient will have to undergo a treatment to eliminate Arava from the body prior to conception. Rifampin Pregnancy And Lactation Text: This medication is Pregnancy Category C and it isn't know if it is safe during pregnancy. It is also excreted in breast milk and should not be used if you are breast feeding. Colchicine Counseling:  Patient counseled regarding adverse effects including but not limited to stomach upset (nausea, vomiting, stomach pain, or diarrhea). Patient instructed to limit alcohol consumption while taking this medication. Colchicine may reduce blood counts especially with prolonged use. The patient understands that monitoring of kidney function and blood counts may be required, especially at baseline. The patient verbalized understanding of the proper use and possible adverse effects of colchicine. All of the patient's questions and concerns were addressed. Bexarotene Pregnancy And Lactation Text: This medication is Pregnancy Category X and should not be given to women who are pregnant or may become pregnant. This medication should not be used if you are breast feeding. Infliximab Pregnancy And Lactation Text: This medication is Pregnancy Category B and is considered safe during pregnancy. It is unknown if this medication is excreted in breast milk. Tetracycline Pregnancy And Lactation Text: This medication is Pregnancy Category D and not consider safe during pregnancy. It is also excreted in breast milk. Enbrel Counseling:  I discussed with the patient the risks of etanercept including but not limited to myelosuppression, immunosuppression, autoimmune hepatitis, demyelinating diseases, lymphoma, and infections. The patient understands that monitoring is required including a PPD at baseline and must alert us or the primary physician if symptoms of infection or other concerning signs are noted. Sski Pregnancy And Lactation Text: This medication is Pregnancy Category D and isn't considered safe during pregnancy. It is excreted in breast milk. Erythromycin Counseling:  I discussed with the patient the risks of erythromycin including but not limited to GI upset, allergic reaction, drug rash, diarrhea, increase in liver enzymes, and yeast infections. Minoxidil Pregnancy And Lactation Text: This medication has not been assigned a Pregnancy Risk Category but animal studies failed to show danger with the topical medication. It is unknown if the medication is excreted in breast milk. Zyclara Counseling:  I discussed with the patient the risks of imiquimod including but not limited to erythema, scaling, itching, weeping, crusting, and pain. Patient understands that the inflammatory response to imiquimod is variable from person to person and was educated regarded proper titration schedule. If flu-like symptoms develop, patient knows to discontinue the medication and contact us. Elidel Counseling: Patient may experience a mild burning sensation during topical application. Elidel is not approved in children less than 3years of age. There have been case reports of hematologic and skin malignancies in patients using topical calcineurin inhibitors although causality is questionable. Cyclophosphamide Pregnancy And Lactation Text: This medication is Pregnancy Category D and it isn't considered safe during pregnancy. This medication is excreted in breast milk. Tazorac Counseling:  Patient advised that medication is irritating and drying. Patient may need to apply sparingly and wash off after an hour before eventually leaving it on overnight. The patient verbalized understanding of the proper use and possible adverse effects of tazorac. All of the patient's questions and concerns were addressed. Acitretin Counseling:  I discussed with the patient the risks of acitretin including but not limited to hair loss, dry lips/skin/eyes, liver damage, hyperlipidemia, depression/suicidal ideation, photosensitivity. Serious rare side effects can include but are not limited to pancreatitis, pseudotumor cerebri, bony changes, clot formation/stroke/heart attack. Patient understands that alcohol is contraindicated since it can result in liver toxicity and significantly prolong the elimination of the drug by many years. Taltz Counseling: I discussed with the patient the risks of ixekizumab including but not limited to immunosuppression, serious infections, worsening of inflammatory bowel disease and drug reactions. The patient understands that monitoring is required including a PPD at baseline and must alert us or the primary physician if symptoms of infection or other concerning signs are noted. Ivermectin Pregnancy And Lactation Text: This medication is Pregnancy Category C and it isn't known if it is safe during pregnancy. It is also excreted in breast milk. Clofazimine Pregnancy And Lactation Text: This medication is Pregnancy Category C and isn't considered safe during pregnancy. It is excreted in breast milk. Ketoconazole Counseling:   Patient counseled regarding improving absorption with orange juice. Adverse effects include but are not limited to breast enlargement, headache, diarrhea, nausea, upset stomach, liver function test abnormalities, taste disturbance, and stomach pain. There is a rare possibility of liver failure that can occur when taking ketoconazole. The patient understands that monitoring of LFTs may be required, especially at baseline. The patient verbalized understanding of the proper use and possible adverse effects of ketoconazole. All of the patient's questions and concerns were addressed. Glycopyrrolate Pregnancy And Lactation Text: This medication is Pregnancy Category B and is considered safe during pregnancy. It is unknown if it is excreted breast milk. Griseofulvin Counseling:  I discussed with the patient the risks of griseofulvin including but not limited to photosensitivity, cytopenia, liver damage, nausea/vomiting and severe allergy. The patient understands that this medication is best absorbed when taken with a fatty meal (e.g., ice cream or french fries). Elidel Pregnancy And Lactation Text: This medication is Pregnancy Category C. It is unknown if this medication is excreted in breast milk. Griseofulvin Pregnancy And Lactation Text: This medication is Pregnancy Category X and is known to cause serious birth defects. It is unknown if this medication is excreted in breast milk but breast feeding should be avoided. Tremfya Pregnancy And Lactation Text: The risk during pregnancy and breastfeeding is uncertain with this medication. Metronidazole Counseling:  I discussed with the patient the risks of metronidazole including but not limited to seizures, nausea/vomiting, a metallic taste in the mouth, nausea/vomiting and severe allergy. Fluconazole Counseling:  Patient counseled regarding adverse effects of fluconazole including but not limited to headache, diarrhea, nausea, upset stomach, liver function test abnormalities, taste disturbance, and stomach pain. There is a rare possibility of liver failure that can occur when taking fluconazole. The patient understands that monitoring of LFTs and kidney function test may be required, especially at baseline. The patient verbalized understanding of the proper use and possible adverse effects of fluconazole. All of the patient's questions and concerns were addressed. Quinolones Counseling:  I discussed with the patient the risks of fluoroquinolones including but not limited to GI upset, allergic reaction, drug rash, diarrhea, dizziness, photosensitivity, yeast infections, liver function test abnormalities, tendonitis/tendon rupture. Hydroxyzine Counseling: Patient advised that the medication is sedating and not to drive a car after taking this medication. Patient informed of potential adverse effects including but not limited to dry mouth, urinary retention, and blurry vision. The patient verbalized understanding of the proper use and possible adverse effects of hydroxyzine. All of the patient's questions and concerns were addressed. Azithromycin Pregnancy And Lactation Text: This medication is considered safe during pregnancy and is also secreted in breast milk. Methotrexate Pregnancy And Lactation Text: This medication is Pregnancy Category X and is known to cause fetal harm. This medication is excreted in breast milk. Glycopyrrolate Counseling:  I discussed with the patient the risks of glycopyrrolate including but not limited to skin rash, drowsiness, dry mouth, difficulty urinating, and blurred vision. Isotretinoin Pregnancy And Lactation Text: This medication is Pregnancy Category X and is considered extremely dangerous during pregnancy. It is unknown if it is excreted in breast milk. Azathioprine Counseling:  I discussed with the patient the risks of azathioprine including but not limited to myelosuppression, immunosuppression, hepatotoxicity, lymphoma, and infections. The patient understands that monitoring is required including baseline LFTs, Creatinine, possible TPMP genotyping and weekly CBCs for the first month and then every 2 weeks thereafter. The patient verbalized understanding of the proper use and possible adverse effects of azathioprine. All of the patient's questions and concerns were addressed. Cyclosporine Pregnancy And Lactation Text: This medication is Pregnancy Category C and it isn't know if it is safe during pregnancy. This medication is excreted in breast milk. Benzoyl Peroxide Counseling: Patient counseled that medicine may cause skin irritation and bleach clothing. In the event of skin irritation, the patient was advised to reduce the amount of the drug applied or use it less frequently. The patient verbalized understanding of the proper use and possible adverse effects of benzoyl peroxide. All of the patient's questions and concerns were addressed. Hydroquinone Counseling:  Patient advised that medication may result in skin irritation, lightening (hypopigmentation), dryness, and burning. In the event of skin irritation, the patient was advised to reduce the amount of the drug applied or use it less frequently. Rarely, spots that are treated with hydroquinone can become darker (pseudoochronosis). Should this occur, patient instructed to stop medication and call the office. The patient verbalized understanding of the proper use and possible adverse effects of hydroquinone. All of the patient's questions and concerns were addressed. Bactrim Pregnancy And Lactation Text: This medication is Pregnancy Category D and is known to cause fetal risk. It is also excreted in breast milk. Otezla Pregnancy And Lactation Text: This medication is Pregnancy Category C and it isn't known if it is safe during pregnancy. It is unknown if it is excreted in breast milk. Carac Pregnancy And Lactation Text: This medication is Pregnancy Category X and contraindicated in pregnancy and in women who may become pregnant. It is unknown if this medication is excreted in breast milk. Prednisone Counseling:  I discussed with the patient the risks of prolonged use of prednisone including but not limited to weight gain, insomnia, osteoporosis, mood changes, diabetes, susceptibility to infection, glaucoma and high blood pressure. In cases where prednisone use is prolonged, patients should be monitored with blood pressure checks, serum glucose levels and an eye exam.  Additionally, the patient may need to be placed on GI prophylaxis, PCP prophylaxis, and calcium and vitamin D supplementation and/or a bisphosphonate. The patient verbalized understanding of the proper use and the possible adverse effects of prednisone. All of the patient's questions and concerns were addressed. Doxycycline Counseling:  Patient counseled regarding possible photosensitivity and increased risk for sunburn. Patient instructed to avoid sunlight, if possible. When exposed to sunlight, patients should wear protective clothing, sunglasses, and sunscreen. The patient was instructed to call the office immediately if the following severe adverse effects occur:  hearing changes, easy bruising/bleeding, severe headache, or vision changes. The patient verbalized understanding of the proper use and possible adverse effects of doxycycline. All of the patient's questions and concerns were addressed. Nsaids Counseling: NSAID Counseling: I discussed with the patient that NSAIDs should be taken with food. Prolonged use of NSAIDs can result in the development of stomach ulcers. Patient advised to stop taking NSAIDs if abdominal pain occurs. The patient verbalized understanding of the proper use and possible adverse effects of NSAIDs. All of the patient's questions and concerns were addressed. 5-Fu Counseling: 5-Fluorouracil Counseling:  I discussed with the patient the risks of 5-fluorouracil including but not limited to erythema, scaling, itching, weeping, crusting, and pain. Tetracycline Counseling: Patient counseled regarding possible photosensitivity and increased risk for sunburn. Patient instructed to avoid sunlight, if possible. When exposed to sunlight, patients should wear protective clothing, sunglasses, and sunscreen. The patient was instructed to call the office immediately if the following severe adverse effects occur:  hearing changes, easy bruising/bleeding, severe headache, or vision changes. The patient verbalized understanding of the proper use and possible adverse effects of tetracycline. All of the patient's questions and concerns were addressed. Patient understands to avoid pregnancy while on therapy due to potential birth defects. Include Pregnancy/Lactation Warning?: No Cellcept Pregnancy And Lactation Text: This medication is Pregnancy Category D and isn't considered safe during pregnancy. It is unknown if this medication is excreted in breast milk. Siliq Counseling:  I discussed with the patient the risks of Siliq including but not limited to new or worsening depression, suicidal thoughts and behavior, immunosuppression, malignancy, posterior leukoencephalopathy syndrome, and serious infections. The patient understands that monitoring is required including a PPD at baseline and must alert us or the primary physician if symptoms of infection or other concerning signs are noted. There is also a special program designed to monitor depression which is required with Siliq. Birth Control Pills Pregnancy And Lactation Text: This medication should be avoided if pregnant and for the first 30 days post-partum. Erythromycin Pregnancy And Lactation Text: This medication is Pregnancy Category B and is considered safe during pregnancy. It is also excreted in breast milk. Clindamycin Counseling: I counseled the patient regarding use of clindamycin as an antibiotic for prophylactic and/or therapeutic purposes. Clindamycin is active against numerous classes of bacteria, including skin bacteria. Side effects may include nausea, diarrhea, gastrointestinal upset, rash, hives, yeast infections, and in rare cases, colitis. Cosentyx Counseling:  I discussed with the patient the risks of Cosentyx including but not limited to worsening of Crohn's disease, immunosuppression, allergic reactions and infections. The patient understands that monitoring is required including a PPD at baseline and must alert us or the primary physician if symptoms of infection or other concerning signs are noted. Benzoyl Peroxide Pregnancy And Lactation Text: This medication is Pregnancy Category C. It is unknown if benzoyl peroxide is excreted in breast milk. Xeljanz Counseling: Tamy Segovia Counseling: I discussed with the patient the risks of Ralph Segovia therapy including increased risk of infection, liver issues, headache, diarrhea, or cold symptoms. Live vaccines should be avoided. They were instructed to call if they have any problems. Fluconazole Pregnancy And Lactation Text: This medication is Pregnancy Category C and it isn't know if it is safe during pregnancy. It is also excreted in breast milk. Oxybutynin Pregnancy And Lactation Text: This medication is Pregnancy Category B and is considered safe during pregnancy. It is unknown if it is excreted in breast milk. Acitretin Pregnancy And Lactation Text: This medication is Pregnancy Category X and should not be given to women who are pregnant or may become pregnant in the future. This medication is excreted in breast milk. Cyclophosphamide Counseling:  I discussed with the patient the risks of cyclophosphamide including but not limited to hair loss, hormonal abnormalities, decreased fertility, abdominal pain, diarrhea, nausea and vomiting, bone marrow suppression and infection. The patient understands that monitoring is required while taking this medication. Xolair Pregnancy And Lactation Text: This medication is Pregnancy Category B and is considered safe during pregnancy. This medication is excreted in breast milk. Valtrex Counseling: I discussed with the patient the risks of valacyclovir including but not limited to kidney damage, nausea, vomiting and severe allergy. The patient understands that if the infection seems to be worsening or is not improving, they are to call. Drysol Pregnancy And Lactation Text: This medication is considered safe during pregnancy and breast feeding. Solaraze Counseling:  I discussed with the patient the risks of Solaraze including but not limited to erythema, scaling, itching, weeping, crusting, and pain. Minoxidil Counseling: Minoxidil is a topical medication which can increase blood flow where it is applied. It is uncertain how this medication increases hair growth. Side effects are uncommon and include stinging and allergic reactions. Cephalexin Counseling: I counseled the patient regarding use of cephalexin as an antibiotic for prophylactic and/or therapeutic purposes. Cephalexin (commonly prescribed under brand name Keflex) is a cephalosporin antibiotic which is active against numerous classes of bacteria, including most skin bacteria. Side effects may include nausea, diarrhea, gastrointestinal upset, rash, hives, yeast infections, and in rare cases, hepatitis, kidney disease, seizures, fever, confusion, neurologic symptoms, and others. Patients with severe allergies to penicillin medications are cautioned that there is about a 10% incidence of cross-reactivity with cephalosporins. When possible, patients with penicillin allergies should use alternatives to cephalosporins for antibiotic therapy. Otezla Counseling: Luis Felipe Maizes Counseling: The side effects of Luis Felipe Maizes were discussed with the patient, including but not limited to worsening or new depression, weight loss, diarrhea, nausea, upper respiratory tract infection, and headache. Patient instructed to call the office should any adverse effect occur. The patient verbalized understanding of the proper use and possible adverse effects of Otezla. All the patient's questions and concerns were addressed. Dapsone Pregnancy And Lactation Text: This medication is Pregnancy Category C and is not considered safe during pregnancy or breast feeding. Topical Clindamycin Counseling: Patient counseled that this medication may cause skin irritation or allergic reactions. In the event of skin irritation, the patient was advised to reduce the amount of the drug applied or use it less frequently. The patient verbalized understanding of the proper use and possible adverse effects of clindamycin. All of the patient's questions and concerns were addressed. Bactrim Counseling:  I discussed with the patient the risks of sulfa antibiotics including but not limited to GI upset, allergic reaction, drug rash, diarrhea, dizziness, photosensitivity, and yeast infections. Rarely, more serious reactions can occur including but not limited to aplastic anemia, agranulocytosis, methemoglobinemia, blood dyscrasias, liver or kidney failure, lung infiltrates or desquamative/blistering drug rashes. Detail Level: Detailed Ivermectin Counseling:  Patient instructed to take medication on an empty stomach with a full glass of water. Patient informed of potential adverse effects including but not limited to nausea, diarrhea, dizziness, itching, and swelling of the extremities or lymph nodes. The patient verbalized understanding of the proper use and possible adverse effects of ivermectin. All of the patient's questions and concerns were addressed. Itraconazole Counseling:  I discussed with the patient the risks of itraconazole including but not limited to liver damage, nausea/vomiting, neuropathy, and severe allergy. The patient understands that this medication is best absorbed when taken with acidic beverages such as non-diet cola or ginger ale. The patient understands that monitoring is required including baseline LFTs and repeat LFTs at intervals. The patient understands that they are to contact us or the primary physician if concerning signs are noted. Stelara Counseling:  I discussed with the patient the risks of ustekinumab including but not limited to immunosuppression, malignancy, posterior leukoencephalopathy syndrome, and serious infections. The patient understands that monitoring is required including a PPD at baseline and must alert us or the primary physician if symptoms of infection or other concerning signs are noted. Tremfya Counseling: I discussed with the patient the risks of guselkumab including but not limited to immunosuppression, serious infections, worsening of inflammatory bowel disease and drug reactions. The patient understands that monitoring is required including a PPD at baseline and must alert us or the primary physician if symptoms of infection or other concerning signs are noted. Bexarotene Counseling:  I discussed with the patient the risks of bexarotene including but not limited to hair loss, dry lips/skin/eyes, liver abnormalities, hyperlipidemia, pancreatitis, depression/suicidal ideation, photosensitivity, drug rash/allergic reactions, hypothyroidism, anemia, leukopenia, infection, cataracts, and teratogenicity. Patient understands that they will need regular blood tests to check lipid profile, liver function tests, white blood cell count, thyroid function tests and pregnancy test if applicable. Hydroxychloroquine Pregnancy And Lactation Text: This medication has been shown to cause fetal harm but it isn't assigned a Pregnancy Risk Category. There are small amounts excreted in breast milk. Hydroxychloroquine Counseling:  I discussed with the patient that a baseline ophthalmologic exam is needed at the start of therapy and every year thereafter while on therapy. A CBC may also be warranted for monitoring. The side effects of this medication were discussed with the patient, including but not limited to agranulocytosis, aplastic anemia, seizures, rashes, retinopathy, and liver toxicity. Patient instructed to call the office should any adverse effect occur. The patient verbalized understanding of the proper use and possible adverse effects of Plaquenil. All the patient's questions and concerns were addressed. Carac Counseling:  I discussed with the patient the risks of Carac including but not limited to erythema, scaling, itching, weeping, crusting, and pain. Cyclosporine Counseling:  I discussed with the patient the risks of cyclosporine including but not limited to hypertension, gingival hyperplasia,myelosuppression, immunosuppression, liver damage, kidney damage, neurotoxicity, lymphoma, and serious infections. The patient understands that monitoring is required including baseline blood pressure, CBC, CMP, lipid panel and uric acid, and then 1-2 times monthly CMP and blood pressure. Thalidomide Counseling: I discussed with the patient the risks of thalidomide including but not limited to birth defects, anxiety, weakness, chest pain, dizziness, cough and severe allergy. Nsaids Pregnancy And Lactation Text: These medications are considered safe up to 30 weeks gestation. It is excreted in breast milk. Albendazole Counseling:  I discussed with the patient the risks of albendazole including but not limited to cytopenia, kidney damage, nausea/vomiting and severe allergy. The patient understands that this medication is being used in an off-label manner. Terbinafine Pregnancy And Lactation Text: This medication is Pregnancy Category B and is considered safe during pregnancy. It is also excreted in breast milk and breast feeding isn't recommended. Dupixent Pregnancy And Lactation Text: This medication likely crosses the placenta but the risk for the fetus is uncertain. This medication is excreted in breast milk. High Dose Vitamin A Counseling: Side effects reviewed, pt to contact office should one occur. Ketoconazole Pregnancy And Lactation Text: This medication is Pregnancy Category C and it isn't know if it is safe during pregnancy. It is also excreted in breast milk and breast feeding isn't recommended. Cellcept Counseling:  I discussed with the patient the risks of mycophenolate mofetil including but not limited to infection/immunosuppression, GI upset, hypokalemia, hypercholesterolemia, bone marrow suppression, lymphoproliferative disorders, malignancy, GI ulceration/bleed/perforation, colitis, interstitial lung disease, kidney failure, progressive multifocal leukoencephalopathy, and birth defects. The patient understands that monitoring is required including a baseline creatinine and regular CBC testing. In addition, patient must alert us immediately if symptoms of infection or other concerning signs are noted. Doxepin Pregnancy And Lactation Text: This medication is Pregnancy Category C and it isn't known if it is safe during pregnancy. It is also excreted in breast milk and breast feeding isn't recommended. Doxepin Counseling:  Patient advised that the medication is sedating and not to drive a car after taking this medication. Patient informed of potential adverse effects including but not limited to dry mouth, urinary retention, and blurry vision. The patient verbalized understanding of the proper use and possible adverse effects of doxepin. All of the patient's questions and concerns were addressed. Spironolactone Counseling: Patient advised regarding risks of diarrhea, abdominal pain, hyperkalemia, birth defects (for female patients), liver toxicity and renal toxicity. The patient may need blood work to monitor liver and kidney function and potassium levels while on therapy. The patient verbalized understanding of the proper use and possible adverse effects of spironolactone. All of the patient's questions and concerns were addressed. CDU 16 Protopic Counseling: Patient may experience a mild burning sensation during topical application. Protopic is not approved in children less than 3years of age. There have been case reports of hematologic and skin malignancies in patients using topical calcineurin inhibitors although causality is questionable. Gabapentin Counseling: I discussed with the patient the risks of gabapentin including but not limited to dizziness, somnolence, fatigue and ataxia. Clofazimine Counseling:  I discussed with the patient the risks of clofazimine including but not limited to skin and eye pigmentation, liver damage, nausea/vomiting, gastrointestinal bleeding and allergy. Odomzo Pregnancy And Lactation Text: This medication is Pregnancy Category X and is absolutely contraindicated during pregnancy. It is unknown if it is excreted in breast milk. High Dose Vitamin A Pregnancy And Lactation Text: High dose vitamin A therapy is contraindicated during pregnancy and breast feeding. SSKI Counseling:  I discussed with the patient the risks of SSKI including but not limited to thyroid abnormalities, metallic taste, GI upset, fever, headache, acne, arthralgias, paraesthesias, lymphadenopathy, easy bleeding, arrhythmias, and allergic reaction. Isotretinoin Counseling: Patient should get monthly blood tests, not donate blood, not drive at night if vision affected, not share medication, and not undergo elective surgery for 6 months after tx completed. Side effects reviewed, pt to contact office should one occur. Doxycycline Pregnancy And Lactation Text: This medication is Pregnancy Category D and not consider safe during pregnancy. It is also excreted in breast milk but is considered safe for shorter treatment courses. Methotrexate Counseling:  Patient counseled regarding adverse effects of methotrexate including but not limited to nausea, vomiting, abnormalities in liver function tests. Patients may develop mouth sores, rash, diarrhea, and abnormalities in blood counts. The patient understands that monitoring is required including LFT's and blood counts. There is a rare possibility of scarring of the liver and lung problems that can occur when taking methotrexate. Persistent nausea, loss of appetite, pale stools, dark urine, cough, and shortness of breath should be reported immediately. Patient advised to discontinue methotrexate treatment at least three months before attempting to become pregnant. I discussed the need for folate supplements while taking methotrexate. These supplements can decrease side effects during methotrexate treatment. The patient verbalized understanding of the proper use and possible adverse effects of methotrexate. All of the patient's questions and concerns were addressed. Rituxan Counseling:  I discussed with the patient the risks of Rituxan infusions. Side effects can include infusion reactions, severe drug rashes including mucocutaneous reactions, reactivation of latent hepatitis and other infections and rarely progressive multifocal leukoencephalopathy. All of the patient's questions and concerns were addressed. Cimzia Counseling:  I discussed with the patient the risks of Cimzia including but not limited to immunosuppression, allergic reactions and infections. The patient understands that monitoring is required including a PPD at baseline and must alert us or the primary physician if symptoms of infection or other concerning signs are noted. Rifampin Counseling: I discussed with the patient the risks of rifampin including but not limited to liver damage, kidney damage, red-orange body fluids, nausea/vomiting and severe allergy. Cimetidine Counseling:  I discussed with the patient the risks of Cimetidine including but not limited to gynecomastia, headache, diarrhea, nausea, drowsiness, arrhythmias, pancreatitis, skin rashes, psychosis, bone marrow suppression and kidney toxicity. Erivedge Counseling- I discussed with the patient the risks of Erivedge including but not limited to nausea, vomiting, diarrhea, constipation, weight loss, changes in the sense of taste, decreased appetite, muscle spasms, and hair loss. The patient verbalized understanding of the proper use and possible adverse effects of Erivedge. All of the patient's questions and concerns were addressed. Spironolactone Pregnancy And Lactation Text: This medication can cause feminization of the male fetus and should be avoided during pregnancy. The active metabolite is also found in breast milk. Imiquimod Counseling:  I discussed with the patient the risks of imiquimod including but not limited to erythema, scaling, itching, weeping, crusting, and pain. Patient understands that the inflammatory response to imiquimod is variable from person to person and was educated regarded proper titration schedule. If flu-like symptoms develop, patient knows to discontinue the medication and contact us. Picato Counseling:  I discussed with the patient the risks of Picato including but not limited to erythema, scaling, itching, weeping, crusting, and pain. Terbinafine Counseling: Patient counseling regarding adverse effects of terbinafine including but not limited to headache, diarrhea, rash, upset stomach, liver function test abnormalities, itching, taste/smell disturbance, nausea, abdominal pain, and flatulence. There is a rare possibility of liver failure that can occur when taking terbinafine. The patient understands that a baseline LFT and kidney function test may be required. The patient verbalized understanding of the proper use and possible adverse effects of terbinafine. All of the patient's questions and concerns were addressed. Minocycline Counseling: Patient advised regarding possible photosensitivity and discoloration of the teeth, skin, lips, tongue and gums. Patient instructed to avoid sunlight, if possible. When exposed to sunlight, patients should wear protective clothing, sunglasses, and sunscreen. The patient was instructed to call the office immediately if the following severe adverse effects occur:  hearing changes, easy bruising/bleeding, severe headache, or vision changes. The patient verbalized understanding of the proper use and possible adverse effects of minocycline. All of the patient's questions and concerns were addressed. Infliximab Counseling:  I discussed with the patient the risks of infliximab including but not limited to myelosuppression, immunosuppression, autoimmune hepatitis, demyelinating diseases, lymphoma, and serious infections. The patient understands that monitoring is required including a PPD at baseline and must alert us or the primary physician if symptoms of infection or other concerning signs are noted. Birth Control Pills Counseling: Birth Control Pill Counseling: I discussed with the patient the potential side effects of OCPs including but not limited to increased risk of stroke, heart attack, thrombophlebitis, deep venous thrombosis, hepatic adenomas, breast changes, GI upset, headaches, and depression. The patient verbalized understanding of the proper use and possible adverse effects of OCPs. All of the patient's questions and concerns were addressed. Dupixent Counseling: I discussed with the patient the risks of dupilumab including but not limited to eye infection and irritation, cold sores, injection site reactions, worsening of asthma, allergic reactions and increased risk of parasitic infection. Live vaccines should be avoided while taking dupilumab. Dupilumab will also interact with certain medications such as warfarin and cyclosporine. The patient understands that monitoring is required and they must alert us or the primary physician if symptoms of infection or other concerning signs are noted. Azithromycin Counseling:  I discussed with the patient the risks of azithromycin including but not limited to GI upset, allergic reaction, drug rash, diarrhea, and yeast infections. Clindamycin Pregnancy And Lactation Text: This medication can be used in pregnancy if certain situations. Clindamycin is also present in breast milk. Cimzia Pregnancy And Lactation Text: This medication crosses the placenta but can be considered safe in certain situations. Cimzia may be excreted in breast milk. Topical Sulfur Applications Pregnancy And Lactation Text: This medication is Pregnancy Category C and has an unknown safety profile during pregnancy. It is unknown if this topical medication is excreted in breast milk. Cephalexin Pregnancy And Lactation Text: This medication is Pregnancy Category B and considered safe during pregnancy. It is also excreted in breast milk but can be used safely for shorter doses. Oxybutynin Counseling:  I discussed with the patient the risks of oxybutynin including but not limited to skin rash, drowsiness, dry mouth, difficulty urinating, and blurred vision. Valtrex Pregnancy And Lactation Text: this medication is Pregnancy Category B and is considered safe during pregnancy. This medication is not directly found in breast milk but it's metabolite acyclovir is present. Protopic Pregnancy And Lactation Text: This medication is Pregnancy Category C. It is unknown if this medication is excreted in breast milk when applied topically. Simponi Counseling:  I discussed with the patient the risks of golimumab including but not limited to myelosuppression, immunosuppression, autoimmune hepatitis, demyelinating diseases, lymphoma, and serious infections. The patient understands that monitoring is required including a PPD at baseline and must alert us or the primary physician if symptoms of infection or other concerning signs are noted. Metronidazole Pregnancy And Lactation Text: This medication is Pregnancy Category B and considered safe during pregnancy. It is also excreted in breast milk. Dapsone Counseling: I discussed with the patient the risks of dapsone including but not limited to hemolytic anemia, agranulocytosis, rashes, methemoglobinemia, kidney failure, peripheral neuropathy, headaches, GI upset, and liver toxicity. Patients who start dapsone require monitoring including baseline LFTs and weekly CBCs for the first month, then every month thereafter. The patient verbalized understanding of the proper use and possible adverse effects of dapsone. All of the patient's questions and concerns were addressed. Humira Counseling:  I discussed with the patient the risks of adalimumab including but not limited to myelosuppression, immunosuppression, autoimmune hepatitis, demyelinating diseases, lymphoma, and serious infections. The patient understands that monitoring is required including a PPD at baseline and must alert us or the primary physician if symptoms of infection or other concerning signs are noted. Xelnikoz Pregnancy And Lactation Text: This medication is Pregnancy Category D and is not considered safe during pregnancy. The risk during breast feeding is also uncertain. Tazorac Pregnancy And Lactation Text: This medication is not safe during pregnancy. It is unknown if this medication is excreted in breast milk. Eucrisa Counseling: Patient may experience a mild burning sensation during topical application. Ramond Many is not approved in children less than 3years of age. Solaraze Pregnancy And Lactation Text: This medication is Pregnancy Category B and is considered safe. There is some data to suggest avoiding during the third trimester. It is unknown if this medication is excreted in breast milk. Rituxan Pregnancy And Lactation Text: This medication is Pregnancy Category C and it isn't know if it is safe during pregnancy. It is unknown if this medication is excreted in breast milk but similar antibodies are known to be excreted. Hydroxyzine Pregnancy And Lactation Text: This medication is not safe during pregnancy and should not be taken. It is also excreted in breast milk and breast feeding isn't recommended. Xolair Counseling:  Patient informed of potential adverse effects including but not limited to fever, muscle aches, rash and allergic reactions. The patient verbalized understanding of the proper use and possible adverse effects of Xolair. All of the patient's questions and concerns were addressed. Drysol Counseling:  I discussed with the patient the risks of drysol/aluminum chloride including but not limited to skin rash, itching, irritation, burning. Odomzo Counseling- I discussed with the patient the risks of Odomzo including but not limited to nausea, vomiting, diarrhea, constipation, weight loss, changes in the sense of taste, decreased appetite, muscle spasms, and hair loss. The patient verbalized understanding of the proper use and possible adverse effects of Odomzo. All of the patient's questions and concerns were addressed.

## 2019-02-21 NOTE — PHYSICAL THERAPY INITIAL EVALUATION ADULT - PERTINENT HX OF CURRENT PROBLEM, REHAB EVAL
Pt with hx of stroke and left sided weakness presents to [facility] with reports of difficulty seeing

## 2019-02-21 NOTE — H&P ADULT - NSHPLABSRESULTS_GEN_ALL_CORE
11.8   7.4   )-----------( 362      ( 21 Feb 2019 10:28 )             38.9     02-21    141  |  102  |  14.0  ----------------------------<  142<H>  4.2   |  25.0  |  0.53    Ca    9.9      21 Feb 2019 10:28    TPro  8.7  /  Alb  5.2  /  TBili  0.3<L>  /  DBili  x   /  AST  16  /  ALT  18  /  AlkPhos  70  02-21    PT/INR - ( 21 Feb 2019 10:28 )   PT: 11.7 sec;   INR: 1.02 ratio         PTT - ( 21 Feb 2019 10:28 )  PTT:31.2 sec    EKG: NSR, LAD, no st and t wave changes 11.8   7.4   )-----------( 362      ( 21 Feb 2019 10:28 )             38.9     02-21    141  |  102  |  14.0  ----------------------------<  142<H>  4.2   |  25.0  |  0.53    Ca    9.9      21 Feb 2019 10:28    TPro  8.7  /  Alb  5.2  /  TBili  0.3<L>  /  DBili  x   /  AST  16  /  ALT  18  /  AlkPhos  70  02-21    PT/INR - ( 21 Feb 2019 10:28 )   PT: 11.7 sec;   INR: 1.02 ratio         PTT - ( 21 Feb 2019 10:28 )  PTT:31.2 sec    EKG: NSR, LAD, no st and t wave changes    < from: CT Brain Stroke Protocol (02.21.19 @ 10:31) >    Impression: Old infarct again seen and unchanged.    Tapering of the proximal left internal carotid artery is seen with   decreased caliber seen throughout the rest of the internal carotid   artery. This finding could be the result of a prior dissection (this   finding appear to be present on the prior MRA [place] [facility] performed on   May 29, 2017).    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      < end of copied text >    < from: MR Head No Cont (02.21.19 @ 15:26) >    No significant change when allowing for differences in   technique.    < end of copied text >

## 2019-02-21 NOTE — ED ADULT TRIAGE NOTE - CHIEF COMPLAINT QUOTE
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 Dr Yoo at bedside upon arrival

## 2019-02-21 NOTE — ED PROVIDER NOTE - PROGRESS NOTE DETAILS
patient with Lt sided weakness, Lt sided extintion, Lt sided vison loss and gaze palsy. sx started when woke up last normal last night. patient confused. medically necessary for CTA prior to creatine due to concern for ICH vs LVO CVA -Fredo DO patient with Lt sided weakness, Lt sided extintion, Lt sided vison loss and gaze palsy. sx started when woke up last normal last night. patient confused. medically necessary for CTA prior to creatine due to concern for ICH vs LVO CVA. Physician consent given for contrast load. -Fredo DO sx started when woke up last normal last night. patient confused. medically necessary for CTA prior to creatine due to concern for ICH vs LVO CVA. Physician consent given for contrast load. 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 no new infarct. -Fredo LEMUS patient more calm now, still concerned over vision, states 'I cant see' endorsing worsening weakness left side has 3/5 strength Lt side, EOM palsy still present, patient without double vision when each eye tested separetyl, rt eye found to drift down/out. with binocular vision patient seeing double. states this is new. possible new CVA will need admit for MR, neuro consult -Fredo LEMUS spoke with CALDERON who was with patient today patient holding eye saying she cant see, was due to be in court for drug charges. complaint of left arm pain and weakness. worsening during visit. called 911.   may have psych diagnosis. unsure   Chantal Solano [facility] [phone]  -Fredo DO

## 2019-02-22 LAB
ANION GAP SERPL CALC-SCNC: 12 MMOL/L — SIGNIFICANT CHANGE UP (ref 5–17)
BUN SERPL-MCNC: 14 MG/DL — SIGNIFICANT CHANGE UP (ref 8–20)
CALCIUM SERPL-MCNC: 9.2 MG/DL — SIGNIFICANT CHANGE UP (ref 8.6–10.2)
CHLORIDE SERPL-SCNC: 103 MMOL/L — SIGNIFICANT CHANGE UP (ref 98–107)
CHOLEST SERPL-MCNC: 110 MG/DL — SIGNIFICANT CHANGE UP (ref 110–199)
CO2 SERPL-SCNC: 23 MMOL/L — SIGNIFICANT CHANGE UP (ref 22–29)
CREAT SERPL-MCNC: 0.52 MG/DL — SIGNIFICANT CHANGE UP (ref 0.5–1.3)
GLUCOSE BLDC GLUCOMTR-MCNC: 134 MG/DL — HIGH (ref 70–99)
GLUCOSE BLDC GLUCOMTR-MCNC: 137 MG/DL — HIGH (ref 70–99)
GLUCOSE BLDC GLUCOMTR-MCNC: 145 MG/DL — HIGH (ref 70–99)
GLUCOSE SERPL-MCNC: 169 MG/DL — HIGH (ref 70–115)
HBA1C BLD-MCNC: 6.7 % — HIGH (ref 4–5.6)
HCT VFR BLD CALC: 34.2 % — LOW (ref 37–47)
HDLC SERPL-MCNC: 31 MG/DL — LOW
HGB BLD-MCNC: 10.4 G/DL — LOW (ref 12–16)
LIPID PNL WITH DIRECT LDL SERPL: 58 MG/DL — SIGNIFICANT CHANGE UP
MCHC RBC-ENTMCNC: 22.7 PG — LOW (ref 27–31)
MCHC RBC-ENTMCNC: 30.4 G/DL — LOW (ref 32–36)
MCV RBC AUTO: 74.7 FL — LOW (ref 81–99)
PLATELET # BLD AUTO: 296 K/UL — SIGNIFICANT CHANGE UP (ref 150–400)
POTASSIUM SERPL-MCNC: 3.6 MMOL/L — SIGNIFICANT CHANGE UP (ref 3.5–5.3)
POTASSIUM SERPL-SCNC: 3.6 MMOL/L — SIGNIFICANT CHANGE UP (ref 3.5–5.3)
RBC # BLD: 4.58 M/UL — SIGNIFICANT CHANGE UP (ref 4.4–5.2)
RBC # FLD: 17.9 % — HIGH (ref 11–15.6)
SODIUM SERPL-SCNC: 138 MMOL/L — SIGNIFICANT CHANGE UP (ref 135–145)
TOTAL CHOLESTEROL/HDL RATIO MEASUREMENT: 4 RATIO — SIGNIFICANT CHANGE UP (ref 3.3–7.1)
TRIGL SERPL-MCNC: 104 MG/DL — SIGNIFICANT CHANGE UP (ref 10–200)
TSH SERPL-MCNC: 0.78 UIU/ML — SIGNIFICANT CHANGE UP (ref 0.27–4.2)
WBC # BLD: 8.5 K/UL — SIGNIFICANT CHANGE UP (ref 4.8–10.8)
WBC # FLD AUTO: 8.5 K/UL — SIGNIFICANT CHANGE UP (ref 4.8–10.8)

## 2019-02-22 PROCEDURE — 99233 SBSQ HOSP IP/OBS HIGH 50: CPT

## 2019-02-22 RX ORDER — NYSTATIN CREAM 100000 [USP'U]/G
1 CREAM TOPICAL
Qty: 0 | Refills: 0 | Status: DISCONTINUED | OUTPATIENT
Start: 2019-02-22 | End: 2019-02-27

## 2019-02-22 RX ORDER — OXYCODONE HYDROCHLORIDE 5 MG/1
5 TABLET ORAL EVERY 6 HOURS
Qty: 0 | Refills: 0 | Status: DISCONTINUED | OUTPATIENT
Start: 2019-02-22 | End: 2019-02-27

## 2019-02-22 RX ORDER — OXYCODONE HYDROCHLORIDE 5 MG/1
5 TABLET ORAL ONCE
Qty: 0 | Refills: 0 | Status: DISCONTINUED | OUTPATIENT
Start: 2019-02-22 | End: 2019-02-22

## 2019-02-22 RX ADMIN — Medication 650 MILLIGRAM(S): at 03:00

## 2019-02-22 RX ADMIN — ATORVASTATIN CALCIUM 40 MILLIGRAM(S): 80 TABLET, FILM COATED ORAL at 20:09

## 2019-02-22 RX ADMIN — OXYCODONE HYDROCHLORIDE 5 MILLIGRAM(S): 5 TABLET ORAL at 03:29

## 2019-02-22 RX ADMIN — OXYCODONE HYDROCHLORIDE 5 MILLIGRAM(S): 5 TABLET ORAL at 22:10

## 2019-02-22 RX ADMIN — ENOXAPARIN SODIUM 40 MILLIGRAM(S): 100 INJECTION SUBCUTANEOUS at 11:45

## 2019-02-22 RX ADMIN — Medication 650 MILLIGRAM(S): at 02:32

## 2019-02-22 RX ADMIN — OXYCODONE HYDROCHLORIDE 5 MILLIGRAM(S): 5 TABLET ORAL at 21:14

## 2019-02-22 RX ADMIN — Medication 650 MILLIGRAM(S): at 21:13

## 2019-02-22 RX ADMIN — Medication 650 MILLIGRAM(S): at 20:00

## 2019-02-22 RX ADMIN — CLOPIDOGREL BISULFATE 75 MILLIGRAM(S): 75 TABLET, FILM COATED ORAL at 11:45

## 2019-02-22 RX ADMIN — OXYCODONE HYDROCHLORIDE 5 MILLIGRAM(S): 5 TABLET ORAL at 04:00

## 2019-02-22 NOTE — OCCUPATIONAL THERAPY INITIAL EVALUATION ADULT - IMPAIRED TRANSFERS: BED/CHAIR, REHAB EVAL
cognition/impaired coordination/decreased ROM/impaired balance/decreased flexibility/impaired motor control/decreased strength

## 2019-02-22 NOTE — OCCUPATIONAL THERAPY INITIAL EVALUATION ADULT - ADDITIONAL COMMENTS
As per pt and last OT evaluation from January pt lives with a friend in a handicap accessible home and pt has a cane which she used at times. Pt had mild tone after discharge in left UE but was functional and independent upon discharge.

## 2019-02-22 NOTE — OCCUPATIONAL THERAPY INITIAL EVALUATION ADULT - DIAGNOSIS, OT EVAL
r/o CVA as pt was recently admitted in January 2019 with CVA and now returned with worsening symptoms

## 2019-02-22 NOTE — PROGRESS NOTE ADULT - SUBJECTIVE AND OBJECTIVE BOX
ALEX MALDONADO    [id number]    48y      Female    Patient is a 48y old  Female who presents with a chief complaint of Double Vision with left sided weakness (22 Feb 2019 11:07)      INTERVAL HPI/OVERNIGHT EVENTS:    Patient still have weakness, still have double vision upon opening of both eyes, she also feel pain in the vaginal area, she is still dysarthric    REVIEW OF SYSTEMS:    CONSTITUTIONAL: No fever, has fatigue  RESPIRATORY: No cough, No shortness of breath  CARDIOVASCULAR: No chest pain, palpitations  GASTROINTESTINAL: No abdominal, No nausea, vomiting  NEUROLOGICAL: No headaches,  weakness and diplopia.  MISCELLANEOUS: No joint swelling or pain       Vital Signs Last 24 Hrs  T(C): 36.9 (22 Feb 2019 15:29), Max: 36.9 (22 Feb 2019 15:29)  T(F): 98.4 (22 Feb 2019 15:29), Max: 98.4 (22 Feb 2019 15:29)  HR: 62 (22 Feb 2019 15:29) (58 - 62)  BP: 137/78 (22 Feb 2019 15:29) (127/73 - 147/81)  BP(mean): 90 (22 Feb 2019 04:19) (90 - 90)  RR: 19 (22 Feb 2019 15:29) (18 - 19)  SpO2: 97% (22 Feb 2019 15:29) (96% - 99%)    PHYSICAL EXAM:    GENERAL: Middle age female looking anxious  HEENT: Atraumatic   NECK: soft, Supple, No JVD,   CHEST/LUNG: Clear to auscultate bilaterally; No wheezing  HEART: S1S2+, Regular rate and rhythm; No murmurs  ABDOMEN: Soft, Nontender, Nondistended; Bowel sounds present  EXTREMITIES:  2+ Peripheral Pulses, No edema  SKIN: No rashes or lesions  NEURO: AAOX3, dysarthric, she has double vision if she open both eye, gets corrected with closing either of eye and if she is  seeing with one eye,  right eye is deviated externally, eyes do not move in any direction to command, she has some asymmetrical Facial, patient has weakness on the left side 2/5, with rigidity and increase tone, right side is moving with power 5/5, Sensation: Intact to touch.   PSYCH: Anxious mood  Genital exam done with Chaperone: erythema and moisture of the vaginal area.        LABS:                        10.4   8.5   )-----------( 296      ( 22 Feb 2019 05:57 )             34.2     02-22    138  |  103  |  14.0  ----------------------------<  169<H>  3.6   |  23.0  |  0.52    Ca    9.2      22 Feb 2019 05:57    TPro  8.7  /  Alb  5.2  /  TBili  0.3<L>  /  DBili  x   /  AST  16  /  ALT  18  /  AlkPhos  70  02-21    PT/INR - ( 21 Feb 2019 10:28 )   PT: 11.7 sec;   INR: 1.02 ratio         PTT - ( 21 Feb 2019 10:28 )  PTT:31.2 sec        I&O's Summary      MEDICATIONS  (STANDING):  atorvastatin 40 milliGRAM(s) Oral at bedtime  clopidogrel Tablet 75 milliGRAM(s) Oral daily  dextrose 5%. 1000 milliLiter(s) (50 mL/Hr) IV Continuous <Continuous>  dextrose 50% Injectable 12.5 Gram(s) IV Push once  dextrose 50% Injectable 25 Gram(s) IV Push once  dextrose 50% Injectable 25 Gram(s) IV Push once  enoxaparin Injectable 40 milliGRAM(s) SubCutaneous daily  insulin lispro (HumaLOG) corrective regimen sliding scale   SubCutaneous three times a day before meals  nystatin Powder 1 Application(s) Topical two times a day    MEDICATIONS  (PRN):  acetaminophen   Tablet .. 650 milliGRAM(s) Oral every 6 hours PRN Mild Pain (1 - 3)  dextrose 40% Gel 15 Gram(s) Oral once PRN Blood Glucose LESS THAN 70 milliGRAM(s)/deciliter  glucagon  Injectable 1 milliGRAM(s) IntraMuscular once PRN Glucose LESS THAN 70 milligrams/deciliter  oxyCODONE    IR 5 milliGRAM(s) Oral every 6 hours PRN Moderate Pain (4 - 6)

## 2019-02-22 NOTE — PROGRESS NOTE ADULT - ASSESSMENT
48y Female with prior stroke and left spastic hemiparesis as well as baseline speech and visual difficulty now presenting with worsening of all symptoms that began this morning.     CVA  No new infarct on MRI.   Will check echocardiogram as does not seem to have been done last admission.   Continue antiplatelet and statin.     Lipid  Repeat fasting lipid panel.   Statin as above.     Diabetes Mellitus   Follow blood glucose.     Behavioral health  Behavioral health evaluation appreciated.  Unclear if there is a component of secondary gain to her presentation as she presented from court, although was not facing imprisonment according to her  (Chantal Solano [phone]).  Specimen for urine toxicology never received.     Discharge planning.   Unclear if will be able to go to rehab with negative MRI.     Case discussed with Dr Albarran.

## 2019-02-22 NOTE — PROGRESS NOTE ADULT - ASSESSMENT
47y/o Female with Hx of DM, HTN, CVA, dysarthrias and diplopia, she has prior history of strokes in the past including large left PCA territory infarct comes in with worsening of diffuclity speaking, worsening double vision as well as worsening left sided weakness being admitted for r/o new stroke.     Plan:     Hx of stroke r/o new stroke: admited under medicine on stroke unit, will do Neuro checks, will cover one eye, will resume her Plavix and statins, MRI done showed no new lesions, Neurology consult appreciated, lipid panel done showed LDL 58, TSH WNL as well Hb a1c is 6.7, PT, OT, speech and swallow eval appreciated, will continue with PT.=, utox pending.      Hx of DM: FS monitoring and coverage insulin, Hb a1c is 6.7     HLD: Continue with statin.     DVT prophylaxis: lovenox sc.     Pain in the vaginal area: will provide Nystatin powder, will get UA.      on board, will further decide about the dispo as per PT recommendations.

## 2019-02-22 NOTE — PROGRESS NOTE ADULT - SUBJECTIVE AND OBJECTIVE BOX
[facility]                                        Neurology at [place]                                 Richie Rasmussen, & Steve                                  [address]                                        [place], [place], [address]                                             [phone]        CC:     HPI:   The patient is a 48y Female with a prior history of strokes in the past including large left PCA territory infarct. She was seen on prior admission in January of 2019 when she presented with diplopia and was noted to have baseline left hemiparesis and speech difficulty as well as visual findings. She had reportedly been poorly compliant with her medications for several weeks prior to that admission due to what was described as "financial reasons."  MRI showed no acute infarct and she was ultimately discharged.     She now returns after meeting her  (Chantal Solano [phone]) in court this morning.   The  reports that she was appearing due to charges dating back to 2017.  She had reported worsening vision.   This was reportedly noted on awakening this morning but the patient is unable to describe this further.   She now describes double vision but also decreased vision overall.   She continues to have left sided weakness but reports that it is now worse.     Interim history:  Still with visual difficulty. Increased speech difficulty.     ROS:   Unobtainable due to patient's condition.     MEDICATIONS  (STANDING):  atorvastatin 40 milliGRAM(s) Oral at bedtime  clopidogrel Tablet 75 milliGRAM(s) Oral daily  dextrose 5%. 1000 milliLiter(s) (50 mL/Hr) IV Continuous <Continuous>  dextrose 50% Injectable 12.5 Gram(s) IV Push once  dextrose 50% Injectable 25 Gram(s) IV Push once  dextrose 50% Injectable 25 Gram(s) IV Push once  enoxaparin Injectable 40 milliGRAM(s) SubCutaneous daily  insulin lispro (HumaLOG) corrective regimen sliding scale   SubCutaneous three times a day before meals      Vital Signs Last 24 Hrs  T(C): 36.5 (22 Feb 2019 08:00), Max: 36.7 (21 Feb 2019 22:14)  T(F): 97.7 (22 Feb 2019 08:00), Max: 98 (21 Feb 2019 22:14)  HR: 59 (22 Feb 2019 08:00) (56 - 72)  BP: 147/81 (22 Feb 2019 08:00) (111/71 - 150/65)  BP(mean): 90 (22 Feb 2019 04:19) (90 - 90)  RR: 19 (22 Feb 2019 08:00) (18 - 19)  SpO2: 97% (22 Feb 2019 08:00) (96% - 100%)    Detailed Neurologic Exam:    Mental status: The patient is awake but appears aphasic. She answers only simple questions and has difficulty with even simple instructions.     Cranial nerves: There is no papilledema. Pupils react symmetrically to light. She reports only central vision in both eyes and reports that she sees double as well which resolves on covering either eye. (Despite claims of severe visual impairment, she had no difficulty using the TV remote at bedside without assistance on 2/21/19). On primary gaze the right eye is deviated externally. On oculomotor testing she does not track with gaze and eyes do not move in any direction to command. Facial sensation is intact. Facial musculature is symmetric. Palate elevates symmetrically. Tongue is midline.    Motor: There is normal bulk and increased tone throughout the left side.  Strength is 5/5 in the right arm and leg.   Strength is 2/5 in the left arm and 2-3/5 in the left leg.    Sensation: Intact to light touch and pin.    Reflexes: 2+ throughout and plantar responses are flexor.    Labs:     02-22    138  |  103  |  14.0  ----------------------------<  169<H>  3.6   |  23.0  |  0.52    Ca    9.2      22 Feb 2019 05:57    TPro  8.7  /  Alb  5.2  /  TBili  0.3<L>  /  DBili  x   /  AST  16  /  ALT  18  /  AlkPhos  70  02-21                            10.4   8.5   )-----------( 296      ( 22 Feb 2019 05:57 )             34.2       Rad:   MRI brain images reviewed (and concur with report): There is no acute pathology.   There are chronic findings unchanged from prior MRI.

## 2019-02-22 NOTE — PATIENT PROFILE ADULT - [id number]_GEN_A_NUR
Last OV 7-6-17. FUV 10-11-17. Pls advise on Lyrica (last rf 3-8-17) and Pantoprazole (last rf 5-8-17)  refills.   unable to assess

## 2019-02-22 NOTE — OCCUPATIONAL THERAPY INITIAL EVALUATION ADULT - TRANSFER SAFETY CONCERNS NOTED: SIT/STAND, REHAB EVAL
decreased safety awareness/decreased weight-shifting ability/losing balance/decreased balance during turns

## 2019-02-22 NOTE — OCCUPATIONAL THERAPY INITIAL EVALUATION ADULT - VISUAL ACUITY
pt with left eyelid closed and severe double vision when SHARAN. eye lids open; and pt with pupil size increase in left eye showing possible optic nerve damage

## 2019-02-22 NOTE — OCCUPATIONAL THERAPY INITIAL EVALUATION ADULT - IMPAIRED TRANSFERS: SIT/STAND, REHAB EVAL
decreased strength/impaired balance/cognition/impaired coordination/abnormal muscle tone/impaired motor control/decreased flexibility

## 2019-02-22 NOTE — OCCUPATIONAL THERAPY INITIAL EVALUATION ADULT - RANGE OF MOTION EXAMINATION
right UE only; Left UE with severe tone and unable to complete ROM; Hand flexed and with extension 0-10, elbow in extension and unable to flex with this writer and shoulder PROM only 0-100/no Active ROM deficits were identified

## 2019-02-22 NOTE — OCCUPATIONAL THERAPY INITIAL EVALUATION ADULT - PLANNED THERAPY INTERVENTIONS, OT EVAL
strengthening/cognitive, visual perceptual/fine motor coordination training/neuromuscular re-education/transfer training/ADL retraining/ROM/stretching/balance training

## 2019-02-23 LAB
GLUCOSE BLDC GLUCOMTR-MCNC: 130 MG/DL — HIGH (ref 70–99)
GLUCOSE BLDC GLUCOMTR-MCNC: 161 MG/DL — HIGH (ref 70–99)
GLUCOSE BLDC GLUCOMTR-MCNC: 164 MG/DL — HIGH (ref 70–99)

## 2019-02-23 PROCEDURE — 99232 SBSQ HOSP IP/OBS MODERATE 35: CPT

## 2019-02-23 RX ADMIN — NYSTATIN CREAM 1 APPLICATION(S): 100000 CREAM TOPICAL at 17:39

## 2019-02-23 RX ADMIN — ENOXAPARIN SODIUM 40 MILLIGRAM(S): 100 INJECTION SUBCUTANEOUS at 11:43

## 2019-02-23 RX ADMIN — NYSTATIN CREAM 1 APPLICATION(S): 100000 CREAM TOPICAL at 05:36

## 2019-02-23 RX ADMIN — ATORVASTATIN CALCIUM 40 MILLIGRAM(S): 80 TABLET, FILM COATED ORAL at 22:31

## 2019-02-23 RX ADMIN — OXYCODONE HYDROCHLORIDE 5 MILLIGRAM(S): 5 TABLET ORAL at 05:34

## 2019-02-23 RX ADMIN — OXYCODONE HYDROCHLORIDE 5 MILLIGRAM(S): 5 TABLET ORAL at 06:30

## 2019-02-23 RX ADMIN — Medication 1: at 11:41

## 2019-02-23 RX ADMIN — CLOPIDOGREL BISULFATE 75 MILLIGRAM(S): 75 TABLET, FILM COATED ORAL at 11:43

## 2019-02-23 RX ADMIN — Medication 1: at 08:14

## 2019-02-23 NOTE — PROGRESS NOTE ADULT - SUBJECTIVE AND OBJECTIVE BOX
[facility]                                        Neurology at [place]                                 Richie Rasmussen, & Steve                                  [address]                                        [place], [place], [address]                                             [phone]        CC: stroke in the past with new speech and vision changes.     HPI:   The patient is a 48y Female with a prior history of strokes in the past including large left PCA territory infarct. She was seen on prior admission in January of 2019 when she presented with diplopia and was noted to have baseline left hemiparesis and speech difficulty as well as visual findings. She had reportedly been poorly compliant with her medications for several weeks prior to that admission due to what was described as "financial reasons."  MRI showed no acute infarct and she was ultimately discharged.     She now returns after meeting her  (Chantal Solano [phone]) in court this morning.   The  reports that she was appearing due to charges dating back to 2017.  She had reported worsening vision.   This was reportedly noted on awakening this morning but the patient is unable to describe this further.   She now describes double vision but also decreased vision overall.   She continues to have left sided weakness but reports that it is now worse.     Interim history:  Still with visual difficulty. Increased speech difficulty.     ROS:   Denies headache or dizziness.  Denies chest pain.  Denies shortness of breath.    MEDICATIONS  (STANDING):  atorvastatin 40 milliGRAM(s) Oral at bedtime  clopidogrel Tablet 75 milliGRAM(s) Oral daily  dextrose 5%. 1000 milliLiter(s) (50 mL/Hr) IV Continuous <Continuous>  enoxaparin Injectable 40 milliGRAM(s) SubCutaneous daily  insulin lispro (HumaLOG) corrective regimen sliding scale   SubCutaneous three times a day before meals  nystatin Powder 1 Application(s) Topical two times a day      Vital Signs Last 24 Hrs  T(C): 36.8 (23 Feb 2019 08:02), Max: 36.9 (22 Feb 2019 15:29)  T(F): 98.2 (23 Feb 2019 08:02), Max: 98.4 (22 Feb 2019 15:29)  HR: 62 (23 Feb 2019 08:02) (61 - 64)  BP: 136/84 (23 Feb 2019 08:02) (127/73 - 155/87)  RR: 18 (23 Feb 2019 08:02) (18 - 20)  SpO2: 95% (23 Feb 2019 08:02) (91% - 99%)    Detailed Neurologic Exam:    Mental status: The patient is awake but appears aphasic. She answers only simple questions and has difficulty with even simple instructions.     Cranial nerves: There is no papilledema. Pupils react symmetrically to light. She reports only central vision in both eyes and reports that she sees double as well which resolves on covering either eye. (Despite claims of severe visual impairment, she had no difficulty using the TV remote at bedside without assistance on 2/21/19). On primary gaze the right eye is deviated externally. On oculomotor testing she does not track with gaze and eyes do not move in any direction to command. Facial sensation is intact. Facial musculature is symmetric. Palate elevates symmetrically. Tongue is midline.    Motor: There is normal bulk and increased tone throughout the left side.  Strength is 5/5 in the right arm and leg.   Strength is 2/5 in the left arm and 2-3/5 in the left leg.    Sensation: Intact to light touch and pin.    Reflexes: 2+ throughout and plantar responses are flexor.    Cerebellar: No dysmetria on finger nose testing on the right.    Labs:     02-22    138  |  103  |  14.0  ----------------------------<  169<H>  3.6   |  23.0  |  0.52    Ca    9.2      22 Feb 2019 05:57    TPro  8.7  /  Alb  5.2  /  TBili  0.3<L>  /  DBili  x   /  AST  16  /  ALT  18  /  AlkPhos  70  02-21                            10.4   8.5   )-----------( 296      ( 22 Feb 2019 05:57 )             34.2       Prior Rad:   MRI brain: There is no acute pathology.   There are chronic findings unchanged from prior MRI.

## 2019-02-23 NOTE — PROGRESS NOTE ADULT - SUBJECTIVE AND OBJECTIVE BOX
ALEX MALDONADO    [id number]    49y      Female    Patient is a 49y old  Female who presents with a chief complaint of Double Vision with left sided weakness (23 Feb 2019 10:03)      INTERVAL HPI/OVERNIGHT EVENTS:    Patient still have weakness, she does not feel improvement, she still also feel pain in the vaginal area, she is still dysarthric    REVIEW OF SYSTEMS:    CONSTITUTIONAL: No fever, has fatigue  RESPIRATORY: No cough, No shortness of breath  CARDIOVASCULAR: No chest pain, palpitations  GASTROINTESTINAL: No abdominal, No nausea, vomiting  NEUROLOGICAL: No headaches,  weakness and diplopia.  MISCELLANEOUS: No joint swelling or pain       Vital Signs Last 24 Hrs  T(C): 36.7 (23 Feb 2019 15:32), Max: 36.9 (23 Feb 2019 11:40)  T(F): 98 (23 Feb 2019 15:32), Max: 98.4 (23 Feb 2019 11:40)  HR: 62 (23 Feb 2019 15:32) (61 - 68)  BP: 139/83 (23 Feb 2019 15:32) (136/84 - 155/87)  RR: 18 (23 Feb 2019 15:32) (18 - 20)  SpO2: 100% (23 Feb 2019 15:32) (91% - 100%)    PHYSICAL EXAM:    GENERAL: Middle age female looking anxious  HEENT: Atraumatic   NECK: soft, Supple, No JVD,   CHEST/LUNG: Clear to auscultate bilaterally; No wheezing  HEART: S1S2+, Regular rate and rhythm; No murmurs  ABDOMEN: Soft, Nontender, Nondistended; Bowel sounds present  EXTREMITIES:  2+ Peripheral Pulses, No edema  SKIN: No rashes or lesions  NEURO: AAOX3, dysarthric, she has double vision if she open both eye, gets corrected with closing either of eye and if she is  seeing with one eye,  right eye is deviated externally, eyes do not move in any direction to command, she has some asymmetrical Facial, patient has weakness on the left side 2/5, with rigidity and increase tone, right side is moving with power 5/5, Sensation: Intact to touch.   PSYCH: Anxious mood        LABS:                        10.4   8.5   )-----------( 296      ( 22 Feb 2019 05:57 )             34.2     02-22    138  |  103  |  14.0  ----------------------------<  169<H>  3.6   |  23.0  |  0.52    Ca    9.2      22 Feb 2019 05:57              I&O's Summary      MEDICATIONS  (STANDING):  atorvastatin 40 milliGRAM(s) Oral at bedtime  clopidogrel Tablet 75 milliGRAM(s) Oral daily  dextrose 5%. 1000 milliLiter(s) (50 mL/Hr) IV Continuous <Continuous>  dextrose 50% Injectable 12.5 Gram(s) IV Push once  dextrose 50% Injectable 25 Gram(s) IV Push once  dextrose 50% Injectable 25 Gram(s) IV Push once  enoxaparin Injectable 40 milliGRAM(s) SubCutaneous daily  insulin lispro (HumaLOG) corrective regimen sliding scale   SubCutaneous three times a day before meals  nystatin Powder 1 Application(s) Topical two times a day    MEDICATIONS  (PRN):  acetaminophen   Tablet .. 650 milliGRAM(s) Oral every 6 hours PRN Mild Pain (1 - 3)  dextrose 40% Gel 15 Gram(s) Oral once PRN Blood Glucose LESS THAN 70 milliGRAM(s)/deciliter  glucagon  Injectable 1 milliGRAM(s) IntraMuscular once PRN Glucose LESS THAN 70 milligrams/deciliter  oxyCODONE    IR 5 milliGRAM(s) Oral every 6 hours PRN Moderate Pain (4 - 6)

## 2019-02-23 NOTE — PROGRESS NOTE ADULT - ASSESSMENT
49y/o Female with Hx of DM, HTN, CVA, dysarthrias and diplopia, she has prior history of strokes in the past including large left PCA territory infarct comes in with worsening of diffuclity speaking, worsening double vision as well as worsening left sided weakness being admitted for r/o new stroke, admited under medicine on stroke unit, Neuro checks showed no worsening of symptoms, her Plavix and statins has been continued, MRI done showed no new lesions, Neurology consult appreciated, lipid panel done showed LDL 58, TSH WNL as well Hb a1c is 6.7, PT, OT, speech and swallow eval appreciated, will continue with PT, utox pending, not sure why she have these symptoms, given imaging with no new findings, she started having these symptoms at court, not sure it there is secondary gain from these symptoms, seen by Psych, will further follow PT and rehab consult for further disposition.      Plan:     Hx of stroke r/o new stroke: admited under medicine on stroke unit, Neuro checks showed no worsening of symptoms, her Plavix and statins has been continued, MRI done showed no new lesions, Neurology consult appreciated, lipid panel done showed LDL 58, TSH WNL as well Hb a1c is 6.7, PT, OT, speech and swallow eval appreciated, will continue with PT, utox pending, not sure why she have these symptoms, given imaging with no new findings, she started having these symptoms at court, not sure it there is secondary gain from these symptoms, seen by Psych.      Hx of DM: FS monitoring and coverage insulin, Hb a1c is 6.7     HLD: Continue with statin.     DVT prophylaxis: lovenox sc.     Pain in the vaginal area: will provide Nystatin powder, UA pending.      on board, will further decide about the dispo as per PT recommendations.

## 2019-02-23 NOTE — PROGRESS NOTE ADULT - ASSESSMENT
48y Female with prior stroke and left spastic hemiparesis as well as baseline speech and visual difficulty now presenting with worsening of all symptoms that began this morning.     CVA  No new infarct on MRI.   TTE negative.  Continue antiplatelet and statin.     Lipid  Repeat fasting lipid panel.   Statin as above.     Diabetes Mellitus   Follow blood glucose.     Behavioral health  Behavioral health evaluation appreciated.  Unclear if there is a component of secondary gain to her presentation as she presented from court, although was not facing imprisonment according to her  (Chantal Solano [phone]).  Specimen for urine toxicology never received.     Discharge planning.   Possible rehab.

## 2019-02-24 LAB
APPEARANCE UR: CLEAR — SIGNIFICANT CHANGE UP
BACTERIA # UR AUTO: ABNORMAL
BILIRUB UR-MCNC: NEGATIVE — SIGNIFICANT CHANGE UP
COLOR SPEC: YELLOW — SIGNIFICANT CHANGE UP
COMMENT - URINE: SIGNIFICANT CHANGE UP
DIFF PNL FLD: NEGATIVE — SIGNIFICANT CHANGE UP
EPI CELLS # UR: ABNORMAL
GLUCOSE BLDC GLUCOMTR-MCNC: 137 MG/DL — HIGH (ref 70–99)
GLUCOSE BLDC GLUCOMTR-MCNC: 141 MG/DL — HIGH (ref 70–99)
GLUCOSE BLDC GLUCOMTR-MCNC: 144 MG/DL — HIGH (ref 70–99)
GLUCOSE BLDC GLUCOMTR-MCNC: 144 MG/DL — HIGH (ref 70–99)
GLUCOSE UR QL: NEGATIVE MG/DL — SIGNIFICANT CHANGE UP
KETONES UR-MCNC: NEGATIVE — SIGNIFICANT CHANGE UP
LEUKOCYTE ESTERASE UR-ACNC: NEGATIVE — SIGNIFICANT CHANGE UP
NITRITE UR-MCNC: NEGATIVE — SIGNIFICANT CHANGE UP
PH UR: 6 — SIGNIFICANT CHANGE UP (ref 5–8)
PROT UR-MCNC: 30 MG/DL
SP GR SPEC: 1.02 — SIGNIFICANT CHANGE UP (ref 1.01–1.02)
UROBILINOGEN FLD QL: 1 MG/DL
WBC UR QL: ABNORMAL

## 2019-02-24 PROCEDURE — 99232 SBSQ HOSP IP/OBS MODERATE 35: CPT

## 2019-02-24 RX ORDER — AMLODIPINE BESYLATE 2.5 MG/1
5 TABLET ORAL DAILY
Qty: 0 | Refills: 0 | Status: DISCONTINUED | OUTPATIENT
Start: 2019-02-24 | End: 2019-02-24

## 2019-02-24 RX ORDER — HYDRALAZINE HCL 50 MG
5 TABLET ORAL EVERY 8 HOURS
Qty: 0 | Refills: 0 | Status: DISCONTINUED | OUTPATIENT
Start: 2019-02-24 | End: 2019-02-27

## 2019-02-24 RX ORDER — LISINOPRIL 2.5 MG/1
5 TABLET ORAL DAILY
Qty: 0 | Refills: 0 | Status: DISCONTINUED | OUTPATIENT
Start: 2019-02-24 | End: 2019-02-25

## 2019-02-24 RX ADMIN — ENOXAPARIN SODIUM 40 MILLIGRAM(S): 100 INJECTION SUBCUTANEOUS at 12:09

## 2019-02-24 RX ADMIN — NYSTATIN CREAM 1 APPLICATION(S): 100000 CREAM TOPICAL at 17:33

## 2019-02-24 RX ADMIN — CLOPIDOGREL BISULFATE 75 MILLIGRAM(S): 75 TABLET, FILM COATED ORAL at 12:09

## 2019-02-24 RX ADMIN — LISINOPRIL 5 MILLIGRAM(S): 2.5 TABLET ORAL at 19:17

## 2019-02-24 RX ADMIN — OXYCODONE HYDROCHLORIDE 5 MILLIGRAM(S): 5 TABLET ORAL at 00:13

## 2019-02-24 RX ADMIN — OXYCODONE HYDROCHLORIDE 5 MILLIGRAM(S): 5 TABLET ORAL at 23:47

## 2019-02-24 RX ADMIN — OXYCODONE HYDROCHLORIDE 5 MILLIGRAM(S): 5 TABLET ORAL at 18:10

## 2019-02-24 RX ADMIN — ATORVASTATIN CALCIUM 40 MILLIGRAM(S): 80 TABLET, FILM COATED ORAL at 21:24

## 2019-02-24 RX ADMIN — Medication 5 MILLIGRAM(S): at 18:41

## 2019-02-24 RX ADMIN — NYSTATIN CREAM 1 APPLICATION(S): 100000 CREAM TOPICAL at 10:13

## 2019-02-24 RX ADMIN — OXYCODONE HYDROCHLORIDE 5 MILLIGRAM(S): 5 TABLET ORAL at 17:33

## 2019-02-24 NOTE — ED ADULT NURSE REASSESSMENT NOTE - NS ED NURSE REASSESS COMMENT FT1
Assumed pt care 1210. Pt lying in bed, crying, no acute distress noted, pt A+Ox0. Charting as noted. Pt complaining of L leg pain. Pt neglectful to L arm, cannot lift or hold up L arm. Cannot hold up L leg at this time. Pt with sensation intact to L side of body. Pt educated on plan of care, RN will continue to reeducate pt during hospital stay.
Assumed pt care at 0730.  Pt a&ox2, baseline dysarthria and L sided weakness r/t previous TIA, hard to decipher between old and new symptoms, pt appears to be unwilling/unable to perform tasks but able to move extremities when RN is not present in room, pt improves speech when asking questions r/t her needs, NIH 15 as per guideline scoring, no acute s/s of respiratory distress noted or reported at this time, will continue to monitor
Pt eye bandage reapplied. Pt provided with pillow and repositioned to comfort. Psych NP now at bedside to patito pt.
Pt placed on bedpan. Pt cleaned, complete care provided. Pt medcated as ordered. Pt remains A+Ox0, crying. Pt safety maintained. pt reeducated on plan of care, RN will continue to reeducate pt during hospital stay.
Pt remains at baseline resting comfortably, denies any c/o pain at this time, no acute s/s of respiratory distress noted or reported at this time, will continue to monitor
Report received from offgoing RN, charting as noted. Upon assessment of orientation pt reports she is unable to tell me where she is "but if you say it I'll know."  Pt has difficulty speaking, is slurring words, with left sided weakness.  When attempting to put pt on bedpan pt states she is unable to assist.  When RN got help to move pt she turned briskly with minimal help.  Patient is normal sinus on monitor, no respiratory distress or chest pain. complaining of hunger.  Called Dr. Mendoza and obtained telephone order for DASH diet with carbohydrate restriction.  Vitals stable, gave report to CDU nurse Pisano and transported over.

## 2019-02-24 NOTE — PROGRESS NOTE ADULT - SUBJECTIVE AND OBJECTIVE BOX
Patient is a 49y old  Female who presents with a chief complaint of Double Vision with left sided weakness (2019 20:01)      HEALTH ISSUES - PROBLEM Dx:  Depressive disorder  Delirium due to another medical condition    INTERVAL HPI/OVERNIGHT EVENTS:  Patient seen and examined at bedside. No acute events overnight. Patient states     Vital Signs Last 24 Hrs  T(C): 36.5 (2019 11:17), Max: 36.6 (2019 05:03)  T(F): 97.7 (2019 11:17), Max: 97.9 (2019 05:03)  HR: 65 (2019 11:17) (65 - 66)  BP: 173/86 (2019 12:05) (167/75 - 173/86)  BP(mean): --  RR: 18 (2019 11:17) (18 - 18)  SpO2: 96% (2019 11:17) (96% - 100%)    CAPILLARY BLOOD GLUCOSE      POCT Blood Glucose.: 137 mg/dL (2019 16:44)  POCT Blood Glucose.: 144 mg/dL (2019 13:22)  POCT Blood Glucose.: 144 mg/dL (2019 12:02)  POCT Blood Glucose.: 141 mg/dL (2019 09:28)      I&O's Summary      CONSTITUTIONAL: Well appearing, well nourished, awake, alert and in no apparent distress  ENMT: Airway patent, Nasal mucosa clear. Mouth with normal mucosa. Throat has no vesicles, no oropharyngeal exudates and uvula is midline.  EYES: Clear bilaterally, pupils equal, round and reactive to light. EOMI.  CARDIAC: Normal rate, regular rhythm.  Heart sounds S1, S2.  No murmurs, rubs or gallops   RESPIRATORY: Breath sounds clear and equal bilaterally. No wheezes, rhales or rhonchi  MUSCULOSKELETAL: Spine appears normal, range of motion is not limited, no muscle or joint tenderness  EXTREMITIES: No edema, cyanosis or deformity   NEUROLOGICAL: Alert and oriented, no focal deficits, no motor or sensory deficits.  SKIN: No rash, skin turgor     MEDICATIONS  (STANDING):  atorvastatin 40 milliGRAM(s) Oral at bedtime  clopidogrel Tablet 75 milliGRAM(s) Oral daily  dextrose 5%. 1000 milliLiter(s) (50 mL/Hr) IV Continuous <Continuous>  dextrose 50% Injectable 12.5 Gram(s) IV Push once  dextrose 50% Injectable 25 Gram(s) IV Push once  dextrose 50% Injectable 25 Gram(s) IV Push once  enoxaparin Injectable 40 milliGRAM(s) SubCutaneous daily  insulin lispro (HumaLOG) corrective regimen sliding scale   SubCutaneous three times a day before meals  lisinopril 5 milliGRAM(s) Oral daily  nystatin Powder 1 Application(s) Topical two times a day    MEDICATIONS  (PRN):  acetaminophen   Tablet .. 650 milliGRAM(s) Oral every 6 hours PRN Mild Pain (1 - 3)  dextrose 40% Gel 15 Gram(s) Oral once PRN Blood Glucose LESS THAN 70 milliGRAM(s)/deciliter  glucagon  Injectable 1 milliGRAM(s) IntraMuscular once PRN Glucose LESS THAN 70 milligrams/deciliter  hydrALAZINE Injectable 5 milliGRAM(s) IV Push every 8 hours PRN if sbp >160 or dbp >100  oxyCODONE    IR 5 milliGRAM(s) Oral every 6 hours PRN Moderate Pain (4 - 6)      LABS:            Urinalysis Basic - ( 2019 14:02 )    Color: Yellow / Appearance: Clear / S.020 / pH: x  Gluc: x / Ketone: Negative  / Bili: Negative / Urobili: 1 mg/dL   Blood: x / Protein: 30 mg/dL / Nitrite: Negative   Leuk Esterase: Negative / RBC: x / WBC 6-10   Sq Epi: x / Non Sq Epi: Moderate / Bacteria: Few          RADIOLOGY & ADDITIONAL TESTS: Patient is a 49y old  Female who presents with a chief complaint of Double Vision with left sided weakness (2019 20:01) with persistent sx, CVA ruled out unclear if somatoform related       HEALTH ISSUES - PROBLEM Dx:  Depressive disorder  Delirium due to another medical condition    INTERVAL HPI/OVERNIGHT EVENTS:  Patient seen and examined at bedside. No acute events overnight. Patient with aphasia, stated she is upset that she cannot move her left side or speak properly; she also was able to ambulate with a cane prior to this episode but now is no longer able to do that. She is willing to go to [facility] ot help her with her speech deficits and ambulation. Patient denies chest pain, SOB, abd pain, N/V, fever, chills, dysuria or any other complaints. All remainder ROS negative.     Vital Signs Last 24 Hrs  T(C): 36.5 (2019 11:17), Max: 36.6 (2019 05:03)  T(F): 97.7 (2019 11:17), Max: 97.9 (2019 05:03)  HR: 65 (2019 11:17) (65 - 66)  BP: 173/86 (2019 12:05) (167/75 - 173/86)  BP(mean): --  RR: 18 (2019 11:17) (18 - 18)  SpO2: 96% (2019 11:17) (96% - 100%)    CAPILLARY BLOOD GLUCOSE  POCT Blood Glucose.: 137 mg/dL (2019 16:44)  POCT Blood Glucose.: 144 mg/dL (2019 13:22)  POCT Blood Glucose.: 144 mg/dL (2019 12:02)  POCT Blood Glucose.: 141 mg/dL (2019 09:28)    CONSTITUTIONAL: Obese Well appearing, well nourished, awake, alert and in no apparent distress  CARDIAC: Normal rate, regular rhythm.  Heart sounds S1, S2.  No murmurs, rubs or gallops   RESPIRATORY: Breath sounds clear and equal bilaterally. No wheezes, rhales or rhonchi  GASTROENTEROLOGY: Soft nt nd bs +normoactive   EXTREMITIES: Peripheral edema b/l le, no cyanosis or deformity   NEUROLOGICAL: Alert and oriented, Left sided weakness, aphasia   SKIN: No rash, skin turgor wnl     MEDICATIONS  (STANDING):  atorvastatin 40 milliGRAM(s) Oral at bedtime  clopidogrel Tablet 75 milliGRAM(s) Oral daily  dextrose 5%. 1000 milliLiter(s) (50 mL/Hr) IV Continuous <Continuous>  dextrose 50% Injectable 12.5 Gram(s) IV Push once  dextrose 50% Injectable 25 Gram(s) IV Push once  dextrose 50% Injectable 25 Gram(s) IV Push once  enoxaparin Injectable 40 milliGRAM(s) SubCutaneous daily  insulin lispro (HumaLOG) corrective regimen sliding scale   SubCutaneous three times a day before meals  lisinopril 5 milliGRAM(s) Oral daily  nystatin Powder 1 Application(s) Topical two times a day    MEDICATIONS  (PRN):  acetaminophen   Tablet .. 650 milliGRAM(s) Oral every 6 hours PRN Mild Pain (1 - 3)  dextrose 40% Gel 15 Gram(s) Oral once PRN Blood Glucose LESS THAN 70 milliGRAM(s)/deciliter  glucagon  Injectable 1 milliGRAM(s) IntraMuscular once PRN Glucose LESS THAN 70 milligrams/deciliter  hydrALAZINE Injectable 5 milliGRAM(s) IV Push every 8 hours PRN if sbp >160 or dbp >100  oxyCODONE    IR 5 milliGRAM(s) Oral every 6 hours PRN Moderate Pain (4 - 6)      LABS:    Urinalysis Basic - ( 2019 14:02 )    Color: Yellow / Appearance: Clear / S.020 / pH: x  Gluc: x / Ketone: Negative  / Bili: Negative / Urobili: 1 mg/dL   Blood: x / Protein: 30 mg/dL / Nitrite: Negative   Leuk Esterase: Negative / RBC: x / WBC 6-10   Sq Epi: x / Non Sq Epi: Moderate / Bacteria: Few          RADIOLOGY & ADDITIONAL TESTS:  No new imaging studies

## 2019-02-24 NOTE — PROGRESS NOTE ADULT - ASSESSMENT
49y/o Female with Hx of DM, HTN, CVA, dysarthrias and diplopia, she has prior history of strokes in the past including large left PCA territory infarct comes in with worsening of diffuclity speaking, worsening double vision as well as worsening left sided weakness being admitted for r/o new stroke, admited under medicine on stroke unit, Neuro checks showed no worsening of symptoms, her Plavix and statins has been continued, MRI done showed no new lesions, Neurology consult appreciated, lipid panel done showed LDL 58, TSH WNL as well Hb a1c is 6.7, PT, OT, speech and swallow eval appreciated, will continue with PT, utox pending, not sure why she have these symptoms, given imaging with no new findings, she started having these symptoms at court, not sure it there is secondary gain from these symptoms, seen by Psych, will further follow PT and rehab consult for further disposition.      Plan:     Hx of stroke r/o new stroke: admited under medicine on stroke unit, Neuro checks showed no worsening of symptoms, her Plavix and statins has been continued, MRI done showed no new lesions, Neurology consult appreciated, lipid panel done showed LDL 58, TSH WNL as well Hb a1c is 6.7, PT, OT, speech and swallow eval appreciated, will continue with PT, utox pending, not sure why she have these symptoms, given imaging with no new findings, she started having these symptoms at court, not sure it there is secondary gain from these symptoms, seen by Psych.      Hx of DM: FS monitoring and coverage insulin, Hb a1c is 6.7     HLD: Continue with statin.     DVT prophylaxis: lovenox sc.     Pain in the vaginal area: will provide Nystatin powder, UA pending.      on board, will further decide about the dispo as per PT recommendations. 49 y/o Female with Hx of DM, HTN, CVA, dysarthrias and diplopia, she has prior history of strokes in the past including large left PCA territory infarct who presented with worsening aphasia/ double vision as well as left sided weakness from baseline. Pt admitted for further evaluation for CVA. Evaluated by neurology, CT head/ MRI head negative for any acute stroke. 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 Deficits unrelated to acute event, clinical concern for possible secondary gain from having these sx with evaluation by psych for somatoform d/o. Pt evaluated by PT/OT and recommended for SUZY. Pt is agreeable and pending placement.       Acute on chronic worsening Left sided weakness, aphasia/ dysarthria and diplopia possibly related to psych/ somatoform d/o   - Pts sx persist  - no evidence of acute CVA, prior noted CVA in the left PCA territory   - unclear if psych related, possibly somatoform given patients social issues  - ct/ cta head and neck as well as MRI head negative for any new acute events  - c/w atorvastatin 40 mg po qhs   - c/w plavix 75mg po qd   - lipid panel with noted LDL 58 -Hb a1c is 6.7  - PT, OT, speech and swallow eval appreciated   - Psych f/u noted and appreciated  - Neuro f/u noted and appreciated     DM2  -  Hb a1c is 6.7   - fs stable  - c/w accuchecks ACHS TID  - c/w HSS  - c/w hypoglycemia protocol     HTN  - bp stable   - c/w lisinopril 5mg po qd     HLD  - c/w atorvastatin 40 mg po qhs     Vaginal rash/UTI  - c/w nystatin powder   - c/w rocephin 1g ivpb qd   - c/w florastor 250mg po bid     DVT prophylaxis  - c/w lovenox sq qd     Dispo: PT recommended SUZY and pt agreeable

## 2019-02-25 LAB
GLUCOSE BLDC GLUCOMTR-MCNC: 129 MG/DL — HIGH (ref 70–99)
GLUCOSE BLDC GLUCOMTR-MCNC: 139 MG/DL — HIGH (ref 70–99)
GLUCOSE BLDC GLUCOMTR-MCNC: 156 MG/DL — HIGH (ref 70–99)
GLUCOSE BLDC GLUCOMTR-MCNC: 158 MG/DL — HIGH (ref 70–99)

## 2019-02-25 PROCEDURE — 99232 SBSQ HOSP IP/OBS MODERATE 35: CPT

## 2019-02-25 RX ORDER — LISINOPRIL 2.5 MG/1
5 TABLET ORAL ONCE
Qty: 0 | Refills: 0 | Status: COMPLETED | OUTPATIENT
Start: 2019-02-25 | End: 2019-02-25

## 2019-02-25 RX ORDER — CEFTRIAXONE 500 MG/1
1 INJECTION, POWDER, FOR SOLUTION INTRAMUSCULAR; INTRAVENOUS ONCE
Qty: 0 | Refills: 0 | Status: COMPLETED | OUTPATIENT
Start: 2019-02-25 | End: 2019-02-25

## 2019-02-25 RX ORDER — CEFTRIAXONE 500 MG/1
1 INJECTION, POWDER, FOR SOLUTION INTRAMUSCULAR; INTRAVENOUS EVERY 24 HOURS
Qty: 0 | Refills: 0 | Status: DISCONTINUED | OUTPATIENT
Start: 2019-02-26 | End: 2019-02-27

## 2019-02-25 RX ORDER — CEFTRIAXONE 500 MG/1
INJECTION, POWDER, FOR SOLUTION INTRAMUSCULAR; INTRAVENOUS
Qty: 0 | Refills: 0 | Status: DISCONTINUED | OUTPATIENT
Start: 2019-02-25 | End: 2019-02-27

## 2019-02-25 RX ORDER — LISINOPRIL 2.5 MG/1
10 TABLET ORAL DAILY
Qty: 0 | Refills: 0 | Status: DISCONTINUED | OUTPATIENT
Start: 2019-02-26 | End: 2019-02-27

## 2019-02-25 RX ORDER — SACCHAROMYCES BOULARDII 250 MG
250 POWDER IN PACKET (EA) ORAL
Qty: 0 | Refills: 0 | Status: DISCONTINUED | OUTPATIENT
Start: 2019-02-25 | End: 2019-02-27

## 2019-02-25 RX ADMIN — Medication 250 MILLIGRAM(S): at 17:25

## 2019-02-25 RX ADMIN — OXYCODONE HYDROCHLORIDE 5 MILLIGRAM(S): 5 TABLET ORAL at 00:42

## 2019-02-25 RX ADMIN — ATORVASTATIN CALCIUM 40 MILLIGRAM(S): 80 TABLET, FILM COATED ORAL at 21:10

## 2019-02-25 RX ADMIN — NYSTATIN CREAM 1 APPLICATION(S): 100000 CREAM TOPICAL at 17:27

## 2019-02-25 RX ADMIN — OXYCODONE HYDROCHLORIDE 5 MILLIGRAM(S): 5 TABLET ORAL at 21:10

## 2019-02-25 RX ADMIN — LISINOPRIL 5 MILLIGRAM(S): 2.5 TABLET ORAL at 12:42

## 2019-02-25 RX ADMIN — NYSTATIN CREAM 1 APPLICATION(S): 100000 CREAM TOPICAL at 05:20

## 2019-02-25 RX ADMIN — LISINOPRIL 5 MILLIGRAM(S): 2.5 TABLET ORAL at 05:20

## 2019-02-25 RX ADMIN — ENOXAPARIN SODIUM 40 MILLIGRAM(S): 100 INJECTION SUBCUTANEOUS at 12:43

## 2019-02-25 RX ADMIN — CEFTRIAXONE 100 GRAM(S): 500 INJECTION, POWDER, FOR SOLUTION INTRAMUSCULAR; INTRAVENOUS at 11:58

## 2019-02-25 RX ADMIN — CLOPIDOGREL BISULFATE 75 MILLIGRAM(S): 75 TABLET, FILM COATED ORAL at 12:43

## 2019-02-25 RX ADMIN — Medication 1: at 09:08

## 2019-02-25 NOTE — PROGRESS NOTE ADULT - SUBJECTIVE AND OBJECTIVE BOX
[facility]                                                                     [facility]Y AT [place]                                                                       Eloisa Diaz, Richie                                                                      [address]                                                                           [place], [place], [address]                                                                                 [phone]                                                                           Neurology Progress Note        CC: stroke in the past with new speech and vision changes.     HPI:   The patient is a 48y Female with a prior history of strokes in the past including large left PCA territory infarct. She was seen on prior admission in January of 2019 when she presented with diplopia and was noted to have baseline left hemiparesis and speech difficulty as well as visual findings. She had reportedly been poorly compliant with her medications for several weeks prior to that admission due to what was described as "financial reasons."  MRI showed no acute infarct and she was ultimately discharged.     Returned with reported reported worsening vision.   Described double vision but also decreased vision overall.   She continues to have left sided weakness but reports that it is now worse.     Still with visual difficulty. Increased speech difficulty.     Detailed Neurologic Exam:    Mental status: The patient is awake but appears aphasic. She answers only simple questions and has difficulty with even simple instructions.     Cranial nerves:  Pupils react symmetrically to light. [place] grossly intact, but poorly cooperative. Limited abduction right eye.                              Facial sensation is intact. Facial musculature is symmetric. Palate elevates symmetrically. Tongue is midline.    Motor: There is normal bulk and increased tone throughout the left side.  Strength is 5/5 in the right arm and leg.   Strength is 2/5 in the left arm and 2-3/5 in the left leg.    Sensation: Intact to light touch and pin.    Reflexes: 2+ throughout and plantar responses are flexor.    Cerebellar: No dysmetria on finger nose testing on the right.    MRI brain: There is no acute pathology.   There are chronic findings unchanged from prior MRI.     48y Female with prior stroke and left spastic hemiparesis as well as baseline speech and visual difficulty now presenting with worsening of all symptoms that began this morning.       CVA  No new infarct on MRI.   TTE negative.  Continue antiplatelet and statin.     Discharge planning.   Possible rehab.

## 2019-02-25 NOTE — PROGRESS NOTE ADULT - ASSESSMENT
47 y/o Female with Hx of DM, HTN, CVA, dysarthrias and diplopia, she has prior history of strokes in the past including large left PCA territory infarct who presented with worsening aphasia/ double vision as well as left sided weakness from baseline. Pt admitted for further evaluation for CVA. Evaluated by neurology, CT head/ MRI head negative for any acute stroke. 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 Deficits unrelated to acute event, clinical concern for possible secondary gain from having these sx with evaluation by psych for somatoform d/o. Pt evaluated by PT/OT and recommended for SUZY. Pt is agreeable and pending placement.     Acute on chronic worsening Left sided weakness, aphasia/ dysarthria and diplopia possibly related to psych/ somatoform d/o   - Pts sx persist   - no evidence of acute CVA, prior noted CVA in the left PCA territory   - unclear if psych related, possibly somatoform given patients social issues  - ct/ cta head and neck as well as MRI head negative for any new acute events  - c/w atorvastatin 40 mg po qhs   - c/w plavix 75mg po qd   - lipid panel with noted LDL 58 -Hb a1c is 6.7  - PT, OT, speech and swallow eval appreciated   - Psych f/u noted and appreciated  - Neuro f/u noted and appreciated     DM2  -  Hb a1c is 6.7   - fs stable  - c/w accuchecks ACHS TID  - c/w HSS  - c/w hypoglycemia protocol     HTN  - bp stable   - c/w lisinopril 5mg po qd     HLD  - c/w atorvastatin 40 mg po qhs     Vaginal rash/UTI  - c/w nystatin powder   - c/w rocephin 1g ivpb qd D#1   - c/w florastor 250mg po bid     DVT prophylaxis  - c/w lovenox sq qd     Dispo: PT recommended SUZY and pt agreeable, pending placement/ [facility]. D/w CM and SW.

## 2019-02-25 NOTE — PROGRESS NOTE ADULT - SUBJECTIVE AND OBJECTIVE BOX
Patient is a 49y old  Female who presents with a chief complaint of Double Vision with left sided weakness (2019 10:12)       HEALTH ISSUES - PROBLEM Dx:  Depressive disorder  Delirium due to another medical condition      INTERVAL HPI/OVERNIGHT EVENTS:    Vital Signs Last 24 Hrs  T(C): 36.8 (2019 08:15), Max: 36.8 (2019 08:15)  T(F): 98.2 (2019 08:15), Max: 98.2 (2019 08:15)  HR: 70 (2019 08:15) (67 - 74)  BP: 138/91 (2019 08:15) (129/80 - 194/95)  BP(mean): --  RR: 18 (2019 08:15) (18 - 20)  SpO2: 95% (2019 08:15) (95% - 99%)    CAPILLARY BLOOD GLUCOSE  POCT Blood Glucose.: 139 mg/dL (2019 12:51)  POCT Blood Glucose.: 156 mg/dL (2019 08:48)  POCT Blood Glucose.: 137 mg/dL (2019 16:44)      I&O's Summary      CONSTITUTIONAL: Well appearing, well nourished, awake, alert and in no apparent distress  ENMT: Airway patent, Nasal mucosa clear. Mouth with normal mucosa. Throat has no vesicles, no oropharyngeal exudates and uvula is midline.  EYES: Clear bilaterally, pupils equal, round and reactive to light. EOMI.  CARDIAC: Normal rate, regular rhythm.  Heart sounds S1, S2.  No murmurs, rubs or gallops   RESPIRATORY: Breath sounds clear and equal bilaterally. No wheezes, rhales or rhonchi  MUSCULOSKELETAL: Spine appears normal, range of motion is not limited, no muscle or joint tenderness  EXTREMITIES: No edema, cyanosis or deformity   NEUROLOGICAL: Alert and oriented, no focal deficits, no motor or sensory deficits.  SKIN: No rash, skin turgor     MEDICATIONS  (STANDING):  atorvastatin 40 milliGRAM(s) Oral at bedtime  cefTRIAXone   IVPB      clopidogrel Tablet 75 milliGRAM(s) Oral daily  dextrose 5%. 1000 milliLiter(s) (50 mL/Hr) IV Continuous <Continuous>  dextrose 50% Injectable 12.5 Gram(s) IV Push once  dextrose 50% Injectable 25 Gram(s) IV Push once  dextrose 50% Injectable 25 Gram(s) IV Push once  enoxaparin Injectable 40 milliGRAM(s) SubCutaneous daily  insulin lispro (HumaLOG) corrective regimen sliding scale   SubCutaneous three times a day before meals  nystatin Powder 1 Application(s) Topical two times a day  saccharomyces boulardii 250 milliGRAM(s) Oral two times a day    MEDICATIONS  (PRN):  acetaminophen   Tablet .. 650 milliGRAM(s) Oral every 6 hours PRN Mild Pain (1 - 3)  dextrose 40% Gel 15 Gram(s) Oral once PRN Blood Glucose LESS THAN 70 milliGRAM(s)/deciliter  glucagon  Injectable 1 milliGRAM(s) IntraMuscular once PRN Glucose LESS THAN 70 milligrams/deciliter  hydrALAZINE Injectable 5 milliGRAM(s) IV Push every 8 hours PRN if sbp >160 or dbp >100  oxyCODONE    IR 5 milliGRAM(s) Oral every 6 hours PRN Moderate Pain (4 - 6)      LABS:            Urinalysis Basic - ( 2019 14:02 )    Color: Yellow / Appearance: Clear / S.020 / pH: x  Gluc: x / Ketone: Negative  / Bili: Negative / Urobili: 1 mg/dL   Blood: x / Protein: 30 mg/dL / Nitrite: Negative   Leuk Esterase: Negative / RBC: x / WBC 6-10   Sq Epi: x / Non Sq Epi: Moderate / Bacteria: Few          RADIOLOGY & ADDITIONAL TESTS: Patient is a 49y old  Female who presents with a chief complaint of Double Vision with left sided weakness (2019 10:12) pendign placement       HEALTH ISSUES - PROBLEM Dx:  Depressive disorder  Delirium due to another medical condition      INTERVAL HPI/OVERNIGHT EVENTS:  Patient seen and examined at bedside. No acute events overnight. Patient states she is upset that she can no longer do what she was doing before. She was visually distraught/ crying     Vital Signs Last 24 Hrs  T(C): 36.8 (2019 08:15), Max: 36.8 (2019 08:15)  T(F): 98.2 (2019 08:15), Max: 98.2 (2019 08:15)  HR: 70 (2019 08:15) (67 - 74)  BP: 138/91 (2019 08:15) (129/80 - 194/95)  BP(mean): --  RR: 18 (2019 08:15) (18 - 20)  SpO2: 95% (2019 08:15) (95% - 99%)    CAPILLARY BLOOD GLUCOSE  POCT Blood Glucose.: 139 mg/dL (2019 12:51)  POCT Blood Glucose.: 156 mg/dL (2019 08:48)  POCT Blood Glucose.: 137 mg/dL (2019 16:44)      I&O's Summary      CONSTITUTIONAL: Well appearing, well nourished, awake, alert and in no apparent distress  ENMT: Airway patent, Nasal mucosa clear. Mouth with normal mucosa. Throat has no vesicles, no oropharyngeal exudates and uvula is midline.  EYES: Clear bilaterally, pupils equal, round and reactive to light. EOMI.  CARDIAC: Normal rate, regular rhythm.  Heart sounds S1, S2.  No murmurs, rubs or gallops   RESPIRATORY: Breath sounds clear and equal bilaterally. No wheezes, rhales or rhonchi  MUSCULOSKELETAL: Spine appears normal, range of motion is not limited, no muscle or joint tenderness  EXTREMITIES: No edema, cyanosis or deformity   NEUROLOGICAL: Alert and oriented, no focal deficits, no motor or sensory deficits.  SKIN: No rash, skin turgor     MEDICATIONS  (STANDING):  atorvastatin 40 milliGRAM(s) Oral at bedtime  cefTRIAXone   IVPB      clopidogrel Tablet 75 milliGRAM(s) Oral daily  dextrose 5%. 1000 milliLiter(s) (50 mL/Hr) IV Continuous <Continuous>  dextrose 50% Injectable 12.5 Gram(s) IV Push once  dextrose 50% Injectable 25 Gram(s) IV Push once  dextrose 50% Injectable 25 Gram(s) IV Push once  enoxaparin Injectable 40 milliGRAM(s) SubCutaneous daily  insulin lispro (HumaLOG) corrective regimen sliding scale   SubCutaneous three times a day before meals  nystatin Powder 1 Application(s) Topical two times a day  saccharomyces boulardii 250 milliGRAM(s) Oral two times a day    MEDICATIONS  (PRN):  acetaminophen   Tablet .. 650 milliGRAM(s) Oral every 6 hours PRN Mild Pain (1 - 3)  dextrose 40% Gel 15 Gram(s) Oral once PRN Blood Glucose LESS THAN 70 milliGRAM(s)/deciliter  glucagon  Injectable 1 milliGRAM(s) IntraMuscular once PRN Glucose LESS THAN 70 milligrams/deciliter  hydrALAZINE Injectable 5 milliGRAM(s) IV Push every 8 hours PRN if sbp >160 or dbp >100  oxyCODONE    IR 5 milliGRAM(s) Oral every 6 hours PRN Moderate Pain (4 - 6)      LABS:            Urinalysis Basic - ( 2019 14:02 )    Color: Yellow / Appearance: Clear / S.020 / pH: x  Gluc: x / Ketone: Negative  / Bili: Negative / Urobili: 1 mg/dL   Blood: x / Protein: 30 mg/dL / Nitrite: Negative   Leuk Esterase: Negative / RBC: x / WBC 6-10   Sq Epi: x / Non Sq Epi: Moderate / Bacteria: Few          RADIOLOGY & ADDITIONAL TESTS:

## 2019-02-26 LAB
GLUCOSE BLDC GLUCOMTR-MCNC: 138 MG/DL — HIGH (ref 70–99)
GLUCOSE BLDC GLUCOMTR-MCNC: 143 MG/DL — HIGH (ref 70–99)
GLUCOSE BLDC GLUCOMTR-MCNC: 151 MG/DL — HIGH (ref 70–99)
GLUCOSE BLDC GLUCOMTR-MCNC: 272 MG/DL — HIGH (ref 70–99)

## 2019-02-26 PROCEDURE — 99232 SBSQ HOSP IP/OBS MODERATE 35: CPT

## 2019-02-26 RX ORDER — DOCUSATE SODIUM 100 MG
100 CAPSULE ORAL DAILY
Qty: 0 | Refills: 0 | Status: DISCONTINUED | OUTPATIENT
Start: 2019-02-26 | End: 2019-02-27

## 2019-02-26 RX ORDER — SENNA PLUS 8.6 MG/1
2 TABLET ORAL AT BEDTIME
Qty: 0 | Refills: 0 | Status: DISCONTINUED | OUTPATIENT
Start: 2019-02-26 | End: 2019-02-27

## 2019-02-26 RX ORDER — POLYETHYLENE GLYCOL 3350 17 G/17G
17 POWDER, FOR SOLUTION ORAL ONCE
Qty: 0 | Refills: 0 | Status: COMPLETED | OUTPATIENT
Start: 2019-02-26 | End: 2019-02-26

## 2019-02-26 RX ADMIN — Medication 1: at 08:09

## 2019-02-26 RX ADMIN — ATORVASTATIN CALCIUM 40 MILLIGRAM(S): 80 TABLET, FILM COATED ORAL at 21:01

## 2019-02-26 RX ADMIN — ENOXAPARIN SODIUM 40 MILLIGRAM(S): 100 INJECTION SUBCUTANEOUS at 14:20

## 2019-02-26 RX ADMIN — NYSTATIN CREAM 1 APPLICATION(S): 100000 CREAM TOPICAL at 05:44

## 2019-02-26 RX ADMIN — LISINOPRIL 10 MILLIGRAM(S): 2.5 TABLET ORAL at 05:43

## 2019-02-26 RX ADMIN — POLYETHYLENE GLYCOL 3350 17 GRAM(S): 17 POWDER, FOR SOLUTION ORAL at 14:34

## 2019-02-26 RX ADMIN — CLOPIDOGREL BISULFATE 75 MILLIGRAM(S): 75 TABLET, FILM COATED ORAL at 14:19

## 2019-02-26 RX ADMIN — CEFTRIAXONE 100 GRAM(S): 500 INJECTION, POWDER, FOR SOLUTION INTRAMUSCULAR; INTRAVENOUS at 07:56

## 2019-02-26 RX ADMIN — OXYCODONE HYDROCHLORIDE 5 MILLIGRAM(S): 5 TABLET ORAL at 21:00

## 2019-02-26 RX ADMIN — Medication 250 MILLIGRAM(S): at 05:43

## 2019-02-26 RX ADMIN — OXYCODONE HYDROCHLORIDE 5 MILLIGRAM(S): 5 TABLET ORAL at 05:43

## 2019-02-26 RX ADMIN — Medication 250 MILLIGRAM(S): at 17:03

## 2019-02-26 RX ADMIN — Medication 100 MILLIGRAM(S): at 17:08

## 2019-02-26 NOTE — PROGRESS NOTE ADULT - ASSESSMENT
47 y/o Female with Hx of DM, HTN, CVA, dysarthrias and diplopia, she has prior history of strokes in the past including large left PCA territory infarct who presented with worsening aphasia/ double vision as well as left sided weakness from baseline. Pt admitted for further evaluation for CVA. Evaluated by neurology, CT head/ MRI head negative for any acute stroke. 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 Deficits unrelated to acute event, clinical concern for possible secondary gain from having these sx with evaluation by psych for somatoform d/o. Pt evaluated by PT/OT and recommended for SUZY. Pt is agreeable and pending placement.     Acute on chronic worsening Left sided weakness, aphasia/ dysarthria and diplopia possibly related to psych/ somatoform d/o   - Pts sx persist   - no evidence of acute CVA, prior noted CVA in the left PCA territory   - unclear if psych related, possibly somatoform given patients social issues  - ct/ cta head and neck as well as MRI head negative for any new acute events  - c/w atorvastatin 40 mg po qhs   - c/w plavix 75mg po qd   - lipid panel with noted LDL 58 -Hb a1c is 6.7  - PT, OT, speech and swallow eval appreciated   - Psych f/u noted and appreciated  - Neuro f/u noted and appreciated     DM2  -  Hb a1c is 6.7   - fs stable  - c/w accuchecks ACHS TID  - c/w HSS  - c/w hypoglycemia protocol     HTN  - bp stable   - c/w lisinopril 5mg po qd     HLD  - c/w atorvastatin 40 mg po qhs     Vaginal rash/UTI  - c/w nystatin powder   - c/w rocephin 1g ivpb qd D#2   - c/w florastor 250mg po bid     Constipation  - c/w colace/ senna  - c/w miralax prn     DVT prophylaxis  - c/w lovenox sq qd     Dispo: PT recommended SUZY and pt agreeable, pending placement/authorization. D/w CM and SW.

## 2019-02-26 NOTE — PROGRESS NOTE ADULT - ASSESSMENT
48y Female with prior stroke and left spastic hemiparesis as well as baseline speech and visual difficulty now presenting with worsening of all symptoms that began this morning.     CVA  No new infarct on MRI.   TTE negative.  Continue antiplatelet and statin.     Lipid  Repeat fasting lipid panel.   Statin as above.     Diabetes Mellitus   Follow blood glucose.     Behavioral health  Behavioral health evaluation appreciated.  Unclear if there is a component of secondary gain to her presentation as she presented from court, although was not facing imprisonment according to her  (Chantal Solano [phone]).    Discharge planning.   Possible rehab.     Case discussed with Dr Davies.

## 2019-02-26 NOTE — PROGRESS NOTE ADULT - SUBJECTIVE AND OBJECTIVE BOX
Patient is a 49y old  Female who presents with a chief complaint of Double Vision with left sided weakness (26 Feb 2019 09:05) with persistent sx requiring SUZY placement       HEALTH ISSUES - PROBLEM Dx:  Depressive disorder  Delirium due to another medical condition      INTERVAL HPI/OVERNIGHT EVENTS:  Patient seen and examined at bedside. No acute events overnight. Patient states she continues to feel upset about her cognitive and functional status is agreeable for SUZY and wants Ross. Aside from this is complaining of feeling slightly constipated. As per RN no other acute issues. Patient denies chest pain, SOB, abd pain, N/V, fever, chills, dysuria or any other complaints. All remainder ROS negative.     Vital Signs Last 24 Hrs  T(C): 36.4 (26 Feb 2019 07:30), Max: 36.7 (25 Feb 2019 23:55)  T(F): 97.6 (26 Feb 2019 07:30), Max: 98.1 (25 Feb 2019 23:55)  HR: 76 (26 Feb 2019 07:30) (66 - 79)  BP: 118/78 (26 Feb 2019 07:30) (118/78 - 157/89)  BP(mean): --  RR: 18 (26 Feb 2019 07:30) (18 - 18)  SpO2: 94% (26 Feb 2019 07:30) (94% - 95%)    CAPILLARY BLOOD GLUCOSE  POCT Blood Glucose.: 143 mg/dL (26 Feb 2019 12:23)  POCT Blood Glucose.: 151 mg/dL (26 Feb 2019 08:04)  POCT Blood Glucose.: 158 mg/dL (25 Feb 2019 21:07)  POCT Blood Glucose.: 129 mg/dL (25 Feb 2019 18:07)    CONSTITUTIONAL: Obese Well appearing, well nourished, awake, alert and in no apparent distress  CARDIAC: Normal rate, regular rhythm.  Heart sounds S1, S2.  No murmurs, rubs or gallops   RESPIRATORY: Breath sounds clear and equal bilaterally. No wheezes, rhales or rhonchi  GASTROENTEROLOGY: Soft nt nd bs +normoactive   EXTREMITIES: Peripheral edema b/l le, no cyanosis or deformity   NEUROLOGICAL: Alert and oriented, Left sided weakness, aphasia dysarthria   SKIN: No rash, skin turgor wnl     MEDICATIONS  (STANDING):  atorvastatin 40 milliGRAM(s) Oral at bedtime  cefTRIAXone   IVPB      cefTRIAXone   IVPB 1 Gram(s) IV Intermittent every 24 hours  clopidogrel Tablet 75 milliGRAM(s) Oral daily  dextrose 5%. 1000 milliLiter(s) (50 mL/Hr) IV Continuous <Continuous>  dextrose 50% Injectable 12.5 Gram(s) IV Push once  dextrose 50% Injectable 25 Gram(s) IV Push once  dextrose 50% Injectable 25 Gram(s) IV Push once  enoxaparin Injectable 40 milliGRAM(s) SubCutaneous daily  insulin lispro (HumaLOG) corrective regimen sliding scale   SubCutaneous three times a day before meals  lisinopril 10 milliGRAM(s) Oral daily  nystatin Powder 1 Application(s) Topical two times a day  saccharomyces boulardii 250 milliGRAM(s) Oral two times a day    MEDICATIONS  (PRN):  acetaminophen   Tablet .. 650 milliGRAM(s) Oral every 6 hours PRN Mild Pain (1 - 3)  dextrose 40% Gel 15 Gram(s) Oral once PRN Blood Glucose LESS THAN 70 milliGRAM(s)/deciliter  glucagon  Injectable 1 milliGRAM(s) IntraMuscular once PRN Glucose LESS THAN 70 milligrams/deciliter  hydrALAZINE Injectable 5 milliGRAM(s) IV Push every 8 hours PRN if sbp >160 or dbp >100  oxyCODONE    IR 5 milliGRAM(s) Oral every 6 hours PRN Moderate Pain (4 - 6)      LABS:  No new labs    RADIOLOGY & ADDITIONAL TESTS:  No new imaging studies

## 2019-02-26 NOTE — PROGRESS NOTE ADULT - SUBJECTIVE AND OBJECTIVE BOX
[facility]                                        Neurology at [place]                                 Richie Rasmussen, & Steve                                  [address]                                        [place], [place], [address]                                             [phone]        CC: stroke in the past with new speech and vision changes.     HPI:   The patient is a 48y Female with a prior history of strokes in the past including large left PCA territory infarct. She was seen on prior admission in January of 2019 when she presented with diplopia and was noted to have baseline left hemiparesis and speech difficulty as well as visual findings. She had reportedly been poorly compliant with her medications for several weeks prior to that admission due to what was described as "financial reasons."  MRI showed no acute infarct and she was ultimately discharged.     She now returns after meeting her  (Chantal Solano [phone]) in court this morning.   The  reports that she was appearing due to charges dating back to 2017.  She had reported worsening vision.   This was reportedly noted on awakening this morning but the patient is unable to describe this further.   She now describes double vision but also decreased vision overall.   She continues to have left sided weakness but reports that it is now worse.     Interim history:  Still with visual difficulty. Increased speech difficulty.     ROS:   Denies headache or dizziness.  Denies chest pain.  Denies shortness of breath.    MEDICATIONS  (STANDING):  atorvastatin 40 milliGRAM(s) Oral at bedtime  cefTRIAXone   IVPB      cefTRIAXone   IVPB 1 Gram(s) IV Intermittent every 24 hours  clopidogrel Tablet 75 milliGRAM(s) Oral daily  dextrose 5%. 1000 milliLiter(s) (50 mL/Hr) IV Continuous <Continuous>  enoxaparin Injectable 40 milliGRAM(s) SubCutaneous daily  insulin lispro (HumaLOG) corrective regimen sliding scale   SubCutaneous three times a day before meals  lisinopril 10 milliGRAM(s) Oral daily  nystatin Powder 1 Application(s) Topical two times a day  polyethylene glycol 3350 17 Gram(s) Oral once  saccharomyces boulardii 250 milliGRAM(s) Oral two times a day      Vital Signs Last 24 Hrs  T(C): 36.4 (26 Feb 2019 07:30), Max: 36.7 (25 Feb 2019 23:55)  T(F): 97.6 (26 Feb 2019 07:30), Max: 98.1 (25 Feb 2019 23:55)  HR: 76 (26 Feb 2019 07:30) (66 - 79)  BP: 118/78 (26 Feb 2019 07:30) (118/78 - 157/89)  RR: 18 (26 Feb 2019 07:30) (18 - 18)  SpO2: 94% (26 Feb 2019 07:30) (94% - 95%)    Detailed Neurologic Exam:    Mental status: The patient is awake but appears aphasic. She answers only simple questions and has difficulty with even simple instructions.     Cranial nerves: There is no papilledema. Pupils react symmetrically to light. She reports only central vision in both eyes and reports that she sees double as well which resolves on covering either eye. (Despite claims of severe visual impairment, she had no difficulty using the TV remote at bedside without assistance on 2/21/19). On primary gaze the right eye is deviated externally. On oculomotor testing she does not track with gaze and eyes do not move in any direction to command. Facial sensation is intact. Facial musculature is symmetric. Palate elevates symmetrically. Tongue is midline.    Motor: There is normal bulk and increased tone throughout the left side.  Strength is 5/5 in the right arm and leg.   Strength is 2/5 in the left arm and 2-3/5 in the left leg.    Sensation: Intact to light touch and pin.    Reflexes: 2+ throughout and plantar responses are flexor.    Cerebellar: No dysmetria on finger nose testing on the right.

## 2019-02-27 ENCOUNTER — TRANSCRIPTION ENCOUNTER (OUTPATIENT)
Age: 49
End: 2019-02-27

## 2019-02-27 VITALS
TEMPERATURE: 98 F | RESPIRATION RATE: 18 BRPM | DIASTOLIC BLOOD PRESSURE: 89 MMHG | OXYGEN SATURATION: 94 % | SYSTOLIC BLOOD PRESSURE: 136 MMHG | HEART RATE: 82 BPM

## 2019-02-27 LAB
GLUCOSE BLDC GLUCOMTR-MCNC: 151 MG/DL — HIGH (ref 70–99)
GLUCOSE BLDC GLUCOMTR-MCNC: 153 MG/DL — HIGH (ref 70–99)

## 2019-02-27 PROCEDURE — 85610 PROTHROMBIN TIME: CPT

## 2019-02-27 PROCEDURE — 97535 SELF CARE MNGMENT TRAINING: CPT

## 2019-02-27 PROCEDURE — 36415 COLL VENOUS BLD VENIPUNCTURE: CPT

## 2019-02-27 PROCEDURE — 93306 TTE W/DOPPLER COMPLETE: CPT

## 2019-02-27 PROCEDURE — 85730 THROMBOPLASTIN TIME PARTIAL: CPT

## 2019-02-27 PROCEDURE — 84443 ASSAY THYROID STIM HORMONE: CPT

## 2019-02-27 PROCEDURE — 80053 COMPREHEN METABOLIC PANEL: CPT

## 2019-02-27 PROCEDURE — 97116 GAIT TRAINING THERAPY: CPT

## 2019-02-27 PROCEDURE — 81001 URINALYSIS AUTO W/SCOPE: CPT

## 2019-02-27 PROCEDURE — 97167 OT EVAL HIGH COMPLEX 60 MIN: CPT

## 2019-02-27 PROCEDURE — 70450 CT HEAD/BRAIN W/O DYE: CPT

## 2019-02-27 PROCEDURE — 99239 HOSP IP/OBS DSCHRG MGMT >30: CPT

## 2019-02-27 PROCEDURE — 70496 CT ANGIOGRAPHY HEAD: CPT

## 2019-02-27 PROCEDURE — 97110 THERAPEUTIC EXERCISES: CPT

## 2019-02-27 PROCEDURE — 85027 COMPLETE CBC AUTOMATED: CPT

## 2019-02-27 PROCEDURE — 80048 BASIC METABOLIC PNL TOTAL CA: CPT

## 2019-02-27 PROCEDURE — 80061 LIPID PANEL: CPT

## 2019-02-27 PROCEDURE — 70551 MRI BRAIN STEM W/O DYE: CPT

## 2019-02-27 PROCEDURE — 93005 ELECTROCARDIOGRAM TRACING: CPT

## 2019-02-27 PROCEDURE — 97530 THERAPEUTIC ACTIVITIES: CPT

## 2019-02-27 PROCEDURE — 82962 GLUCOSE BLOOD TEST: CPT

## 2019-02-27 PROCEDURE — 80307 DRUG TEST PRSMV CHEM ANLYZR: CPT

## 2019-02-27 PROCEDURE — 70498 CT ANGIOGRAPHY NECK: CPT

## 2019-02-27 PROCEDURE — 99285 EMERGENCY DEPT VISIT HI MDM: CPT

## 2019-02-27 PROCEDURE — 83036 HEMOGLOBIN GLYCOSYLATED A1C: CPT

## 2019-02-27 PROCEDURE — 97112 NEUROMUSCULAR REEDUCATION: CPT

## 2019-02-27 PROCEDURE — 99232 SBSQ HOSP IP/OBS MODERATE 35: CPT

## 2019-02-27 RX ORDER — OXYCODONE HYDROCHLORIDE 5 MG/1
1 TABLET ORAL
Qty: 0 | Refills: 0 | COMMUNITY
Start: 2019-02-27

## 2019-02-27 RX ORDER — ACETAMINOPHEN 500 MG
2 TABLET ORAL
Qty: 0 | Refills: 0 | COMMUNITY
Start: 2019-02-27

## 2019-02-27 RX ORDER — LACTULOSE 10 G/15ML
20 SOLUTION ORAL ONCE
Qty: 0 | Refills: 0 | Status: COMPLETED | OUTPATIENT
Start: 2019-02-27 | End: 2019-02-27

## 2019-02-27 RX ORDER — CEFPODOXIME PROXETIL 100 MG
100 TABLET ORAL EVERY 12 HOURS
Qty: 0 | Refills: 0 | Status: DISCONTINUED | OUTPATIENT
Start: 2019-02-28 | End: 2019-02-27

## 2019-02-27 RX ORDER — NYSTATIN CREAM 100000 [USP'U]/G
1 CREAM TOPICAL
Qty: 0 | Refills: 0 | COMMUNITY
Start: 2019-02-27

## 2019-02-27 RX ORDER — ATORVASTATIN CALCIUM 80 MG/1
1 TABLET, FILM COATED ORAL
Qty: 0 | Refills: 0 | COMMUNITY
Start: 2019-02-27

## 2019-02-27 RX ORDER — FLUCONAZOLE 150 MG/1
150 TABLET ORAL ONCE
Qty: 0 | Refills: 0 | Status: COMPLETED | OUTPATIENT
Start: 2019-02-27 | End: 2019-02-27

## 2019-02-27 RX ORDER — SENNA PLUS 8.6 MG/1
2 TABLET ORAL
Qty: 0 | Refills: 0 | COMMUNITY
Start: 2019-02-27

## 2019-02-27 RX ORDER — CLOPIDOGREL BISULFATE 75 MG/1
1 TABLET, FILM COATED ORAL
Qty: 0 | Refills: 0 | COMMUNITY
Start: 2019-02-27

## 2019-02-27 RX ORDER — INSULIN LISPRO 100/ML
0 VIAL (ML) SUBCUTANEOUS
Qty: 0 | Refills: 0 | COMMUNITY

## 2019-02-27 RX ORDER — CEFPODOXIME PROXETIL 100 MG
1 TABLET ORAL
Qty: 0 | Refills: 0 | COMMUNITY
Start: 2019-02-27

## 2019-02-27 RX ORDER — SACCHAROMYCES BOULARDII 250 MG
1 POWDER IN PACKET (EA) ORAL
Qty: 0 | Refills: 0 | COMMUNITY
Start: 2019-02-27

## 2019-02-27 RX ORDER — DOCUSATE SODIUM 100 MG
1 CAPSULE ORAL
Qty: 0 | Refills: 0 | COMMUNITY
Start: 2019-02-27

## 2019-02-27 RX ADMIN — FLUCONAZOLE 150 MILLIGRAM(S): 150 TABLET ORAL at 11:40

## 2019-02-27 RX ADMIN — Medication 250 MILLIGRAM(S): at 05:19

## 2019-02-27 RX ADMIN — OXYCODONE HYDROCHLORIDE 5 MILLIGRAM(S): 5 TABLET ORAL at 06:16

## 2019-02-27 RX ADMIN — Medication 100 MILLIGRAM(S): at 12:54

## 2019-02-27 RX ADMIN — OXYCODONE HYDROCHLORIDE 5 MILLIGRAM(S): 5 TABLET ORAL at 05:19

## 2019-02-27 RX ADMIN — Medication 1: at 12:14

## 2019-02-27 RX ADMIN — Medication 1: at 08:27

## 2019-02-27 RX ADMIN — ENOXAPARIN SODIUM 40 MILLIGRAM(S): 100 INJECTION SUBCUTANEOUS at 12:14

## 2019-02-27 RX ADMIN — CLOPIDOGREL BISULFATE 75 MILLIGRAM(S): 75 TABLET, FILM COATED ORAL at 12:14

## 2019-02-27 RX ADMIN — LACTULOSE 20 GRAM(S): 10 SOLUTION ORAL at 11:39

## 2019-02-27 RX ADMIN — NYSTATIN CREAM 1 APPLICATION(S): 100000 CREAM TOPICAL at 08:28

## 2019-02-27 NOTE — DISCHARGE NOTE ADULT - SECONDARY DIAGNOSIS.
UTI (urinary tract infection) Rash Diabetes mellitus type 2, controlled HTN (hypertension) Depression Constipation

## 2019-02-27 NOTE — DISCHARGE NOTE ADULT - CARE PROVIDERS DIRECT ADDRESSES
[email or web address]

## 2019-02-27 NOTE — DISCHARGE NOTE ADULT - MEDICATION SUMMARY - MEDICATIONS TO TAKE
I will START or STAY ON the medications listed below when I get home from the hospital:    oxyCODONE 5 mg oral tablet  -- 1 tab(s) by mouth every 6 hours, As needed, Moderate Pain (4 - 6)  -- Indication: For moderate pain     acetaminophen 325 mg oral tablet  -- 2 tab(s) by mouth every 6 hours, As needed, Mild Pain (1 - 3)  -- Indication: For mild pain     Glucophage 1000 mg oral tablet  -- 1 tab(s) by mouth 2 times a day  -- Check with your doctor before becoming pregnant.  Do not drink alcoholic beverages when taking this medication.  It is very important that you take or use this exactly as directed.  Do not skip doses or discontinue unless directed by your doctor.  Obtain medical advice before taking any non-prescription drugs as some may affect the action of this medication.  Take with food or milk.    -- Indication: For Diabetes     HumaLOG 100 units/mL subcutaneous solution  -- 1 Unit(s) if Glucose 151 - 200  2 Unit(s) if Glucose 201 - 250  3 Unit(s) if Glucose 251 - 300  4 Unit(s) if Glucose 301 - 350  5 Unit(s) if Glucose 351 - 400  6 Unit(s) if Glucose Greater Than 400  -- Indication: For Diabetes     atorvastatin 40 mg oral tablet  -- 1 tab(s) by mouth once a day (at bedtime)  -- Indication: For hyperlipidemia     clopidogrel 75 mg oral tablet  -- 1 tab(s) by mouth once a day  -- Indication: For CVA (cerebral vascular accident)    cefpodoxime 100 mg oral tablet  -- 1 tab(s) by mouth every 12 hours x 2 days  -- Indication: For uti     nystatin 100,000 units/g topical powder  -- 1 application on skin 2 times a day  -- Indication: For vaginal rash     docusate sodium 100 mg oral capsule  -- 1 cap(s) by mouth once a day  -- Indication: For Constipation     senna oral tablet  -- 2 tab(s) by mouth once a day (at bedtime), As needed, Constipation  -- Indication: For Constipation     saccharomyces boulardii lyo 250 mg oral capsule  -- 1 cap(s) by mouth 2 times a day  -- Indication: For prophylaxis     omeprazole 10 mg oral delayed release capsule  -- 1 cap(s) by mouth once a day   -- It is very important that you take or use this exactly as directed.  Do not skip doses or discontinue unless directed by your doctor.  Obtain medical advice before taking any non-prescription drugs as some may affect the action of this medication.  Swallow whole.  Do not crush.    -- Indication: For prophylaxis

## 2019-02-27 NOTE — DISCHARGE NOTE ADULT - PLAN OF CARE
Pt with hx of CVA, prior Left sided weakness/ dysarthria, diplopia and aphasia but currently with worsening sx possibly from old foci. Clinical concern for somatoform but psychiatrically cleared. Please continue with physical therapy at the facility. Continue with antibiotics and probiotic for 2 more days. S/p diflucan x 1 dose. Continue with current medications. C/w colace/senna and miralax or lactulose prn Prevent future episodes

## 2019-02-27 NOTE — DISCHARGE NOTE ADULT - HOSPITAL COURSE
49 y/o Female with Hx of DM, HTN, CVA, dysarthrias and diplopia, she has prior history of strokes in the past including large left PCA territory infarct who presented with worsening aphasia/ double vision as well as left sided weakness from baseline. Pt admitted for further evaluation for CVA. Evaluated by neurology, CT head/ MRI head negative for any acute stroke. 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 Deficits unrelated to acute event, clinical concern for possible secondary gain from having these sx with evaluation by psych for somatoform d/o. Given persistence of sx, unclear dx, possible that current sx exacerbation from prior CVA foci. Hospital course complicated by UTI and vaginal rash for which pt received diflucan 150mg x 1 dose and is to c/w vantin x 2 more days outpt. Pt evaluated by PT/OT and recommended for [facility]. Pt to be transferred to [facility] today.       Discharge planning took 45 minutes    Vital Signs Last 24 Hrs  T(C): 36.6 (27 Feb 2019 09:55), Max: 36.8 (26 Feb 2019 16:38)  T(F): 97.9 (27 Feb 2019 09:55), Max: 98.2 (26 Feb 2019 16:38)  HR: 98 (27 Feb 2019 09:55) (74 - 98)  BP: 106/80 (27 Feb 2019 09:55) (97/74 - 116/78)  BP(mean): --  RR: 18 (27 Feb 2019 09:55) (18 - 18)  SpO2: 95% (27 Feb 2019 09:55) (94% - 95%)    CONSTITUTIONAL: Obese Well appearing, well nourished, awake, alert and in no apparent distress  CARDIAC: Normal rate, regular rhythm.  Heart sounds S1, S2.  No murmurs, rubs or gallops   RESPIRATORY: Breath sounds clear and equal bilaterally. No wheezes, rhales or rhonchi  GASTROENTEROLOGY: Soft nt nd bs +normoactive   EXTREMITIES: Peripheral edema b/l le, no cyanosis or deformity   NEUROLOGICAL: Alert and oriented, Left sided weakness, aphasia dysarthria  diplopia persists   SKIN: No rash, skin turgor wnl 47 y/o Female with Hx of DM, HTN, CVA, dysarthrias and diplopia, she has prior history of strokes in the past including large left PCA territory infarct who presented with worsening aphasia/ double vision as well as left sided weakness from baseline. Pt admitted for further evaluation for CVA. Evaluated by neurology, CT head/ MRI head negative for any acute stroke. 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 Deficits unrelated to acute event, clinical concern for possible secondary gain from having these sx with evaluation by psych for somatoform d/o. Given persistence of sx, unclear dx, possible that current sx exacerbation from prior CVA foci. Hospital course complicated by UTI and vaginal rash for which pt received diflucan 150mg x 1 dose and is to c/w vantin x 2 more days outpt. Also complicated by constipation for which bowel regimen placed. Pt evaluated by PT/OT and recommended for [facility]. Pt to be transferred to [facility] today.       Discharge planning took 45 minutes    Vital Signs Last 24 Hrs  T(C): 36.6 (27 Feb 2019 09:55), Max: 36.8 (26 Feb 2019 16:38)  T(F): 97.9 (27 Feb 2019 09:55), Max: 98.2 (26 Feb 2019 16:38)  HR: 98 (27 Feb 2019 09:55) (74 - 98)  BP: 106/80 (27 Feb 2019 09:55) (97/74 - 116/78)  BP(mean): --  RR: 18 (27 Feb 2019 09:55) (18 - 18)  SpO2: 95% (27 Feb 2019 09:55) (94% - 95%)    CONSTITUTIONAL: Obese Well appearing, well nourished, awake, alert and in no apparent distress  CARDIAC: Normal rate, regular rhythm.  Heart sounds S1, S2.  No murmurs, rubs or gallops   RESPIRATORY: Breath sounds clear and equal bilaterally. No wheezes, rhales or rhonchi  GASTROENTEROLOGY: Soft nt nd bs +normoactive   EXTREMITIES: Peripheral edema b/l le, no cyanosis or deformity   NEUROLOGICAL: Alert and oriented, Left sided weakness, aphasia dysarthria  diplopia persists   SKIN: No rash, skin turgor wnl

## 2019-02-27 NOTE — PROGRESS NOTE ADULT - SUBJECTIVE AND OBJECTIVE BOX
[facility]                                        Neurology at [place]                                 Richie Rasmussen, & Steve                                  [address]                                        [place], [place], [address]                                             [phone]        CC: stroke in the past with new speech and vision changes.     HPI:   The patient is a 48y Female with a prior history of strokes in the past including large left PCA territory infarct. She was seen on prior admission in January of 2019 when she presented with diplopia and was noted to have baseline left hemiparesis and speech difficulty as well as visual findings. She had reportedly been poorly compliant with her medications for several weeks prior to that admission due to what was described as "financial reasons."MRI showed no acute infarct and she was ultimately discharged.     She now returns after meeting her  (Chantal Solano [phone]) in court this morning. The  reports that she was appearing due to charges dating back to 2017.She had reported worsening vision. This was reportedly noted on awakening this morning but the patient is unable to describe this further. She now describes double vision but also decreased vision overall.   She continues to have left sided weakness but reports that it is now worse.     Interim history: Still with visual difficulty.  Has left eye closed, but can keep open after I open it. Increased speech difficulty.       ROS neurology: Denies headache or dizziness. Denies weakness/numbness.  (+) speech/language deficits.  (+) blurred vision.      MEDICATIONS  (STANDING):  atorvastatin 40 milliGRAM(s) Oral at bedtime  clopidogrel Tablet 75 milliGRAM(s) Oral daily  dextrose 5%. 1000 milliLiter(s) (50 mL/Hr) IV Continuous <Continuous>  dextrose 50% Injectable 12.5 Gram(s) IV Push once  dextrose 50% Injectable 25 Gram(s) IV Push once  dextrose 50% Injectable 25 Gram(s) IV Push once  docusate sodium 100 milliGRAM(s) Oral daily  enoxaparin Injectable 40 milliGRAM(s) SubCutaneous daily  insulin lispro (HumaLOG) corrective regimen sliding scale   SubCutaneous three times a day before meals  nystatin Powder 1 Application(s) Topical two times a day  saccharomyces boulardii 250 milliGRAM(s) Oral two times a day    MEDICATIONS  (PRN):  acetaminophen   Tablet .. 650 milliGRAM(s) Oral every 6 hours PRN Mild Pain (1 - 3)  dextrose 40% Gel 15 Gram(s) Oral once PRN Blood Glucose LESS THAN 70 milliGRAM(s)/deciliter  glucagon  Injectable 1 milliGRAM(s) IntraMuscular once PRN Glucose LESS THAN 70 milligrams/deciliter  oxyCODONE    IR 5 milliGRAM(s) Oral every 6 hours PRN Moderate Pain (4 - 6)  senna 2 Tablet(s) Oral at bedtime PRN Constipation      Vital Signs Last 24 Hrs  T(C): 36.6 (27 Feb 2019 09:55), Max: 36.8 (26 Feb 2019 16:38)  T(F): 97.9 (27 Feb 2019 09:55), Max: 98.2 (26 Feb 2019 16:38)  HR: 98 (27 Feb 2019 09:55) (74 - 98)  BP: 106/80 (27 Feb 2019 09:55) (97/74 - 116/78)  BP(mean): --  RR: 18 (27 Feb 2019 09:55) (18 - 18)  SpO2: 95% (27 Feb 2019 09:55) (94% - 95%)    Detailed Neurologic Exam:    Mental status: The patient is awake but appears aphasic. She answers only simple questions and has difficulty with even simple instructions.     Cranial nerves:  Pupils react symmetrically to light. She blinks to threat b/l.  On primary gaze the right eye is deviated externally. On oculomotor testing she does not track with gaze or move her eyes.  This is overcome with VOR testing. Facial sensation is intact.  Facial musculature is symmetric. Palate elevates symmetrically. Tongue is midline.    Motor: There is normal bulk and increased tone throughout the left side.  Strength is 5/5 in the right arm and leg.   Strength is 3/5 in the left arm and 2-3/5 in the left leg.    Sensation: Intact to light touch and pin.    Reflexes: 2+ throughout and plantar responses are flexor.    Cerebellar: Not participating with finger to nose testing.       Labs:  no recent CBC/CMP      Rad:  MRI brain 2/21/19: no acute CVA, mass or blood, (+) old CVAs in left posterior temporal, posterior frontal-parietal and occipital lobes, also right parietal lobe

## 2019-02-27 NOTE — DISCHARGE NOTE ADULT - CARE PROVIDER_API CALL
Luisito Amin)  Neurology; Vascular Neurology  [address] [facility] [address]  [place] [address]  Phone: [phone]  Fax: [phone]  Follow Up Time:     Cole Jolley)  Psychiatry  [address] [place] [address]  Phone: [phone]  Fax: [phone]  Follow Up Time:

## 2019-02-27 NOTE — DISCHARGE NOTE ADULT - CARE PLAN
Principal Discharge DX:	CVA (cerebral vascular accident)  Goal:	Prevent future episodes  Assessment and plan of treatment:	Pt with hx of CVA, prior Left sided weakness/ dysarthria, diplopia and aphasia but currently with worsening sx possibly from old foci. Clinical concern for somatoform but psychiatrically cleared. Please continue with physical therapy at the facility.  Secondary Diagnosis:	UTI (urinary tract infection)  Assessment and plan of treatment:	Continue with antibiotics and probiotic for 2 more days. S/p diflucan x 1 dose.  Secondary Diagnosis:	Rash  Assessment and plan of treatment:	Continue with current medications.  Secondary Diagnosis:	Diabetes mellitus type 2, controlled  Assessment and plan of treatment:	Continue with current medications.  Secondary Diagnosis:	HTN (hypertension)  Assessment and plan of treatment:	Continue with current medications.  Secondary Diagnosis:	Depression  Assessment and plan of treatment:	Continue with current medications.  Secondary Diagnosis:	Constipation  Assessment and plan of treatment:	C/w colace/senna and miralax or lactulose prn

## 2019-02-27 NOTE — PROGRESS NOTE ADULT - ASSESSMENT
48y Female with prior stroke and left spastic hemiparesis as well as baseline speech and visual difficulty now presenting with worsening of all symptoms     CVA  No new infarct on MRI.   TTE negative.  Continue antiplatelet and statin.     Lipid  Repeat fasting lipid panel. LDL=58  Statin as above.     Diabetes Mellitus   Follow blood glucose.     Behavioral health  Behavioral health evaluation appreciated.  Unclear if there is a component of secondary gain to her presentation as she presented from court, although was not facing imprisonment according to her  (Chantal Ormsby [phone]).    Discharge planning.   Possible rehab.     will follow with you    Antony Quiroz MD PhD   [id number]

## 2019-02-27 NOTE — DISCHARGE NOTE ADULT - PATIENT PORTAL LINK FT
You can access the [facility] Patient Portal, offered by [facility], by registering with the following website: [email or web address]

## 2019-02-27 NOTE — PROGRESS NOTE ADULT - PROVIDER SPECIALTY LIST ADULT
Hospitalist
Internal Medicine
Internal Medicine
Neurology
Internal Medicine
Hospitalist

## 2019-03-05 ENCOUNTER — RECORD ABSTRACTING (OUTPATIENT)
Age: 49
End: 2019-03-05

## 2019-03-05 DIAGNOSIS — F32.9 MAJOR DEPRESSIVE DISORDER, SINGLE EPISODE, UNSPECIFIED: ICD-10-CM

## 2019-03-05 DIAGNOSIS — R21 RASH AND OTHER NONSPECIFIC SKIN ERUPTION: ICD-10-CM

## 2019-03-05 DIAGNOSIS — G81.10 SPASTIC HEMIPLEGIA AFFECTING UNSPECIFIED SIDE: ICD-10-CM

## 2019-03-05 DIAGNOSIS — I10 ESSENTIAL (PRIMARY) HYPERTENSION: ICD-10-CM

## 2019-03-05 DIAGNOSIS — K59.00 CONSTIPATION, UNSPECIFIED: ICD-10-CM

## 2019-03-05 DIAGNOSIS — H53.2 DIPLOPIA: ICD-10-CM

## 2019-03-05 DIAGNOSIS — R53.1 WEAKNESS: ICD-10-CM

## 2019-03-05 DIAGNOSIS — I63.9 CEREBRAL INFARCTION, UNSPECIFIED: ICD-10-CM

## 2019-03-05 DIAGNOSIS — N89.8 OTHER SPECIFIED NONINFLAMMATORY DISORDERS OF VAGINA: ICD-10-CM

## 2019-03-05 DIAGNOSIS — Z87.440 PERSONAL HISTORY OF URINARY (TRACT) INFECTIONS: ICD-10-CM

## 2019-03-05 DIAGNOSIS — E11.9 TYPE 2 DIABETES MELLITUS W/OUT COMPLICATIONS: ICD-10-CM

## 2019-03-05 DIAGNOSIS — E78.5 HYPERLIPIDEMIA, UNSPECIFIED: ICD-10-CM

## 2019-03-05 RX ORDER — ATORVASTATIN CALCIUM 40 MG/1
40 TABLET, FILM COATED ORAL
Qty: 90 | Refills: 1 | Status: ACTIVE | COMMUNITY

## 2019-03-05 RX ORDER — OXYCODONE 5 MG/1
5 TABLET ORAL EVERY 6 HOURS
Refills: 0 | Status: ACTIVE | COMMUNITY

## 2019-03-05 RX ORDER — DOCUSATE SODIUM 100 MG/1
100 CAPSULE, LIQUID FILLED ORAL
Refills: 0 | Status: ACTIVE | COMMUNITY

## 2019-03-05 RX ORDER — CEFPODOXIME PROXETIL 100 MG/1
100 TABLET, FILM COATED ORAL
Refills: 0 | Status: ACTIVE | COMMUNITY

## 2019-03-05 RX ORDER — SENNOSIDES 8.6 MG TABLETS 8.6 MG/1
TABLET ORAL AT BEDTIME
Refills: 0 | Status: ACTIVE | COMMUNITY

## 2019-03-05 RX ORDER — METFORMIN HYDROCHLORIDE 1000 MG/1
1000 TABLET, FILM COATED ORAL TWICE DAILY
Refills: 0 | Status: ACTIVE | COMMUNITY

## 2019-03-05 RX ORDER — INSULIN LISPRO 100 [IU]/ML
100 INJECTION, SOLUTION INTRAVENOUS; SUBCUTANEOUS
Refills: 0 | Status: ACTIVE | COMMUNITY

## 2019-03-05 RX ORDER — OMEPRAZOLE 10 MG/1
10 CAPSULE, DELAYED RELEASE ORAL DAILY
Refills: 0 | Status: ACTIVE | COMMUNITY

## 2019-03-05 RX ORDER — ACETAMINOPHEN 325 MG/1
325 TABLET ORAL EVERY 6 HOURS
Refills: 0 | Status: ACTIVE | COMMUNITY

## 2019-03-05 RX ORDER — CLOPIDOGREL BISULFATE 75 MG/1
75 TABLET, FILM COATED ORAL
Refills: 0 | Status: ACTIVE | COMMUNITY

## 2019-03-05 RX ORDER — SACCHAROMYCES BOULARDII 250 MG
250 CAPSULE ORAL TWICE DAILY
Refills: 0 | Status: ACTIVE | COMMUNITY

## 2019-03-05 RX ORDER — NYSTATIN 100000 1/G
100000 POWDER TOPICAL
Refills: 0 | Status: ACTIVE | COMMUNITY

## 2019-03-07 ENCOUNTER — APPOINTMENT (OUTPATIENT)
Dept: CARDIOLOGY | Facility: CLINIC | Age: 49
End: 2019-03-07

## 2019-03-12 ENCOUNTER — APPOINTMENT (OUTPATIENT)
Dept: PHYSICAL MEDICINE AND REHAB | Facility: CLINIC | Age: 49
End: 2019-03-12

## 2019-03-21 ENCOUNTER — APPOINTMENT (OUTPATIENT)
Dept: NEUROLOGY | Facility: CLINIC | Age: 49
End: 2019-03-21

## 2019-05-29 ENCOUNTER — EMERGENCY (EMERGENCY)
Facility: HOSPITAL | Age: 49
LOS: 1 days | Discharge: HOME | End: 2019-05-29
Attending: STUDENT IN AN ORGANIZED HEALTH CARE EDUCATION/TRAINING PROGRAM
Payer: SELF-PAY

## 2019-05-29 VITALS
RESPIRATION RATE: 20 BRPM | SYSTOLIC BLOOD PRESSURE: 125 MMHG | HEIGHT: 67 IN | HEART RATE: 118 BPM | OXYGEN SATURATION: 99 % | TEMPERATURE: 99 F | DIASTOLIC BLOOD PRESSURE: 94 MMHG | WEIGHT: 160.06 LBS

## 2019-05-29 LAB
ALLERGY+IMMUNOLOGY DIAG STUDY NOTE: SIGNIFICANT CHANGE UP
ANION GAP SERPL CALC-SCNC: 13 MMOL/L — SIGNIFICANT CHANGE UP (ref 5–17)
BLD GP AB SCN SERPL QL: ABNORMAL
BUN SERPL-MCNC: 9 MG/DL — SIGNIFICANT CHANGE UP (ref 8–20)
CALCIUM SERPL-MCNC: 9.2 MG/DL — SIGNIFICANT CHANGE UP (ref 8.6–10.2)
CHLORIDE SERPL-SCNC: 100 MMOL/L — SIGNIFICANT CHANGE UP (ref 98–107)
CO2 SERPL-SCNC: 23 MMOL/L — SIGNIFICANT CHANGE UP (ref 22–29)
CREAT SERPL-MCNC: 0.53 MG/DL — SIGNIFICANT CHANGE UP (ref 0.5–1.3)
DIR ANTIGLOB POLYSPECIFIC INTERPRETATION: SIGNIFICANT CHANGE UP
GLUCOSE SERPL-MCNC: 142 MG/DL — HIGH (ref 70–115)
HCT VFR BLD CALC: 35.4 % — LOW (ref 37–47)
HGB BLD-MCNC: 11.8 G/DL — LOW (ref 12–16)
MCHC RBC-ENTMCNC: 26.5 PG — LOW (ref 27–31)
MCHC RBC-ENTMCNC: 33.3 G/DL — SIGNIFICANT CHANGE UP (ref 32–36)
MCV RBC AUTO: 79.6 FL — LOW (ref 81–99)
PLATELET # BLD AUTO: 300 K/UL — SIGNIFICANT CHANGE UP (ref 150–400)
POTASSIUM SERPL-MCNC: 5.7 MMOL/L — HIGH (ref 3.5–5.3)
POTASSIUM SERPL-SCNC: 5.7 MMOL/L — HIGH (ref 3.5–5.3)
RBC # BLD: 4.45 M/UL — SIGNIFICANT CHANGE UP (ref 4.4–5.2)
RBC # FLD: 19.8 % — HIGH (ref 11–15.6)
SODIUM SERPL-SCNC: 136 MMOL/L — SIGNIFICANT CHANGE UP (ref 135–145)
WBC # BLD: 6 K/UL — SIGNIFICANT CHANGE UP (ref 4.8–10.8)
WBC # FLD AUTO: 6 K/UL — SIGNIFICANT CHANGE UP (ref 4.8–10.8)

## 2019-05-29 PROCEDURE — 99284 EMERGENCY DEPT VISIT MOD MDM: CPT

## 2019-05-29 PROCEDURE — 76856 US EXAM PELVIC COMPLETE: CPT | Mod: 26

## 2019-05-29 PROCEDURE — 86077 PHYS BLOOD BANK SERV XMATCH: CPT

## 2019-05-29 RX ORDER — SODIUM CHLORIDE 9 MG/ML
1000 INJECTION INTRAMUSCULAR; INTRAVENOUS; SUBCUTANEOUS ONCE
Refills: 0 | Status: COMPLETED | OUTPATIENT
Start: 2019-05-29 | End: 2019-05-29

## 2019-05-29 RX ADMIN — SODIUM CHLORIDE 1000 MILLILITER(S): 9 INJECTION INTRAMUSCULAR; INTRAVENOUS; SUBCUTANEOUS at 20:13

## 2019-05-29 NOTE — ED PROVIDER NOTE - PROGRESS NOTE DETAILS
pt no additional bleeding here no active bleeding spoke to Megha - nursing supervisor at [place] - told her findings h/h stable, fiborids on US, need to follow up with GYN - agrees with dc and follow up

## 2019-05-29 NOTE — ED PROVIDER NOTE - GENITOURINARY, MLM
No active hemorrhaging from cervix, dried blood at vaginal vault, non-tender. Chaperone: Brianna Torres (RN)

## 2019-05-29 NOTE — ED PROVIDER NOTE - UNABLE TO OBTAIN
HPI limited secondary to patient's baseline mental status Severe Illness/Injury Unable to obtain ROS secondary to baseline mental status

## 2019-05-29 NOTE — ED ADULT NURSE NOTE - PMH
Acid reflux    Anemia    Cerebrovascular accident (CVA), unspecified mechanism    Diabetes    HTN (hypertension)

## 2019-05-29 NOTE — ED ADULT NURSE NOTE - CHIEF COMPLAINT QUOTE
pt sent from [facility] for profuse vaginal bleeding. Pt baseline mental status.

## 2019-05-29 NOTE — ED PROVIDER NOTE - NS ED MD DISPO DISCHARGE
[address]. 
[phone] Patient: Rosaline Garcia MRN: [id number] :1995 A check belem indicates which time of day the medication should be taken. My Medications CONTINUE taking these medications Instructions Each Dose to Equal  
 Morning Noon Evening Bedtime  
 capsaicin 0.075 % topical cream  
   
 Apply  to affected area three (3) times daily. famotidine 20 mg tablet Commonly known as:  PEPCID Take 1 Tab by mouth two (2) times a day. 20 mg  
    
  
   
   
  
   
  
 gabapentin 400 mg capsule Commonly known as:  NEURONTIN Take 400 mg by mouth five (5) times daily. 400 mg  
    
  
   
  
   
  
   
  
  
 hydrOXYzine HCl 25 mg tablet Commonly known as:  ATARAX Take 25-50 mg by mouth four (4) times daily as needed for Anxiety. 25-50 mg  
    
   
   
   
  
 insulin glargine 100 unit/mL (3 mL) Inpn Commonly known as:  LANTUS SOLOSTAR U-100 INSULIN  
   
 10 Units by SubCUTAneous route two (2) times a day. 10 Units  
    
  
   
   
  
   
  
 insulin regular 100 unit/mL injection Commonly known as:  LORE Jacob R Take 5 units with meals. Plus sliding scale. metoclopramide HCl 10 mg tablet Commonly known as:  REGLAN Take 1 Tab by mouth Before breakfast, lunch, and dinner. 10 mg  
    
  
   
  
   
  
   
  
 metoprolol tartrate 25 mg tablet Commonly known as:  LOPRESSOR Take 25 mg by mouth two (2) times a day. 25 mg  
    
  
   
   
  
   
  
 ondansetron hcl 4 mg tablet Commonly known as:  Nicole Dakins Take 1 Tab by mouth every eight (8) hours as needed for Nausea. 4 mg  
    
   
   
   
  
 oxyCODONE-acetaminophen 5-325 mg per tablet Commonly known as:  PERCOCET Take 1 Tab by mouth every six (6) hours as needed. Max Daily Amount: 4 Tabs. 1 Tab  
    
   
   
   
  
 pantoprazole 40 mg tablet Commonly known as:  PROTONIX Take 40 mg by mouth daily. 40 mg  
    
  
   
   
   
  
 tiZANidine 4 mg tablet Commonly known as:  Nirmal Rebel Take 4 mg by mouth three (3) times daily. 4 mg Home

## 2019-05-29 NOTE — ED ADULT NURSE NOTE - OBJECTIVE STATEMENT
Assumed pt care, pt comes from [facility], pt lying in stretcher, no acute distress noted. Pt sent for vaginal bleeding, bleeding noted to erika underneath pt, controlled. Pt is A+Ox1, slurred speech, this is pt baseline. Hx CVA. Assumed pt care, pt comes from [facility], pt lying in stretcher, no acute distress noted. Pt sent for vaginal bleeding, small amount of bleeding noted to erika underneath pt, bleeding controlled. Pt cleaned and linens changed, resting comfortably at this time. Pt is A+Ox1, slurred speech, this is pt baseline. Hx CVA.

## 2019-05-29 NOTE — ED ADULT TRIAGE NOTE - CHIEF COMPLAINT QUOTE
pt sent from [facility] for profuse vaginal bleeding. Pt baseline mental status.

## 2019-05-29 NOTE — ED PROVIDER NOTE - CLINICAL SUMMARY MEDICAL DECISION MAKING FREE TEXT BOX
50 y/o F with PMHx of acid reflux, CVA (at baseline aphasic and incontinence of urine and stool), DM and HTN presents to ED from [place] due to hypotension, tachycardia and profuse vaginal bleeding. 50 y/o F with PMHx of acid reflux, CVA (at baseline aphasic and incontinence of urine and stool), DM and HTN presents to ED from [place] due to hypotension, tachycardia and profuse vaginal bleeding - pt with fibroids on imaging - H/H stable no bleeding while here - follow up with GYN and PMD

## 2019-05-29 NOTE — ED PROVIDER NOTE - OBJECTIVE STATEMENT
50 y/o F with PMHx of acid reflux, CVA (at baseline aphasic and incontinence of urine and stool), DM and HTN presents to ED from [place] due to hypotension, tachycardia and profuse vaginal bleeding. 48 y/o F with PMHx of acid reflux, anemia, CVA (at baseline aphasic and incontinence of urine and stool), DM and HTN, on Plavix presents to ED from [place] due to hypotension, tachycardia and profuse vaginal bleeding. In the ED, the patient is complaining of vaginal pain.

## 2019-05-29 NOTE — ED PROVIDER NOTE - EYES, MLM
Clear bilaterally, pupils equal, round and reactive to light. Pupils equal, round and reactive to light. Pale conjunctiva.

## 2019-05-29 NOTE — ED PROVIDER NOTE - PMH
Acid reflux    Cerebrovascular accident (CVA), unspecified mechanism    Diabetes    HTN (hypertension) Acid reflux    Anemia    Cerebrovascular accident (CVA), unspecified mechanism    Diabetes    HTN (hypertension)

## 2019-05-30 VITALS
TEMPERATURE: 99 F | SYSTOLIC BLOOD PRESSURE: 137 MMHG | DIASTOLIC BLOOD PRESSURE: 88 MMHG | HEART RATE: 71 BPM | RESPIRATION RATE: 18 BRPM | OXYGEN SATURATION: 97 %

## 2019-05-30 LAB
HCT VFR BLD CALC: 32.1 % — LOW (ref 37–47)
HGB BLD-MCNC: 10.4 G/DL — LOW (ref 12–16)

## 2019-05-30 PROCEDURE — 85027 COMPLETE CBC AUTOMATED: CPT

## 2019-05-30 PROCEDURE — 85018 HEMOGLOBIN: CPT

## 2019-05-30 PROCEDURE — 86880 COOMBS TEST DIRECT: CPT

## 2019-05-30 PROCEDURE — 80048 BASIC METABOLIC PNL TOTAL CA: CPT

## 2019-05-30 PROCEDURE — 86900 BLOOD TYPING SEROLOGIC ABO: CPT

## 2019-05-30 PROCEDURE — 85014 HEMATOCRIT: CPT

## 2019-05-30 PROCEDURE — 99284 EMERGENCY DEPT VISIT MOD MDM: CPT | Mod: 25

## 2019-05-30 PROCEDURE — 76856 US EXAM PELVIC COMPLETE: CPT

## 2019-05-30 PROCEDURE — 86850 RBC ANTIBODY SCREEN: CPT

## 2019-05-30 PROCEDURE — 86901 BLOOD TYPING SEROLOGIC RH(D): CPT

## 2019-05-30 PROCEDURE — 84702 CHORIONIC GONADOTROPIN TEST: CPT

## 2019-05-30 PROCEDURE — 36415 COLL VENOUS BLD VENIPUNCTURE: CPT

## 2019-05-30 PROCEDURE — 86870 RBC ANTIBODY IDENTIFICATION: CPT

## 2019-05-30 NOTE — ED ADULT NURSE REASSESSMENT NOTE - NS ED NURSE REASSESS COMMENT FT1
Pt continues awaiting transportation back to [facility]. Pt is sleeping in stretcher comfortably at this time, no apparent distress noted. VS as noted. Pt denies any pain or discomforts at this time. Pt safety maintained. VS as noted.

## 2019-05-30 NOTE — ED ADULT NURSE REASSESSMENT NOTE - NS ED NURSE REASSESS COMMENT FT1
MD PARKS at bedside, pt provided with PO fluids using aspiration precautions, refusing to sign d/c paperwork,  but understanding of d/c status and happily awaiting d/c back to [facility].

## 2019-05-30 NOTE — ED ADULT NURSE REASSESSMENT NOTE - NS ED NURSE REASSESS COMMENT FT1
Pt refusing to sign d/c paperwork, wants to speak with doctor. MD PARKS made aware and to speak with patient. Pt awaiting transportation back to [facility].

## 2019-05-30 NOTE — ED ADULT NURSE REASSESSMENT NOTE - NS ED NURSE REASSESS COMMENT FT1
Pt is resting in stretcher comfortably at this time, no apparent distress noted. Pt denies any pain or discomforts at this time. Pt safety maintained, RN reinforced use of call bell when needed. RN educated and updated pt on plan of care, awaiting dispo. Pt expresses understanding, and able to teach plan of care back to RN. RN will continue to update pt regarding plan of care.

## 2019-06-05 ENCOUNTER — APPOINTMENT (OUTPATIENT)
Dept: NEUROLOGY | Facility: CLINIC | Age: 49
End: 2019-06-05

## 2019-06-10 NOTE — CHART NOTE - NSCHARTNOTEFT_GEN_A_CORE
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 The E antigen is a member of the RH group of red cell antigens. Other major antigens in this group are D, C, e, c. After Anti-D, Anti-E is the most commonly identified antibody in the RH system. The E antigen is present on the red cells of 29% of Caucasians and 22% of AAs. Allo-Anti-E is considered clinically significant associated with mild to severe; immediate or delayed hemolytic transfusion reactions with hemoglobinuria.  It is known to cause only mild HDFN. Blood selected for transfusion of immunized patients showing any reactivity for Anti-E should be E antigen negative and crossmatch compatible. Z01.84

## 2019-07-20 NOTE — ED PROVIDER NOTE - NS ED MD DISPO ISOLATION TYPES
Aleyda Stone)  OBSGYN  General  [facility] [place] [id number]  Phone: [phone]  Fax: [phone]  Follow Up Time: None

## 2020-01-28 PROBLEM — D64.9 ANEMIA, UNSPECIFIED: Chronic | Status: ACTIVE | Noted: 2019-05-30

## 2020-04-23 ENCOUNTER — APPOINTMENT (OUTPATIENT)
Dept: NEUROLOGY | Facility: CLINIC | Age: 50
End: 2020-04-23

## 2020-08-03 NOTE — SPEECH LANGUAGE PATHOLOGY EVALUATION - SLP PERTINENT HISTORY OF CURRENT PROBLEM
Lab Results   Component Value Date    HGBA1C 7 0 (H) 06/12/2020       Recent Labs     08/02/20 2051 08/02/20  2202 08/03/20  0554 08/03/20  1055   POCGLU 75 115 131 294*       Blood Sugar Average: Last 72 hrs:  (P) 180 [id number]     · Last A1c 7%, continue insulin sliding scale, basal/prandial insulin  · No episodes of hypoglycemia, but glucose levels have been labile Pt s/p MRI today (4/1/17). Results indicate left parietal temporal subdural infarct. Smaller focus of acute/subacute in left occipital lobe. Chronic infarct of the right occipital lobe.

## 2020-09-17 ENCOUNTER — APPOINTMENT (OUTPATIENT)
Dept: NEUROLOGY | Facility: CLINIC | Age: 50
End: 2020-09-17

## 2020-09-23 NOTE — ED ADULT NURSE NOTE - NIH STROKE SCALE: 2. BEST GAZE, QM
No (1) Partial gaze palsy; gaze is abnormal in one or both eyes, but forced deviation or total gaze paresis is not present

## 2020-12-21 PROBLEM — Z87.440 HISTORY OF URINARY TRACT INFECTION: Status: RESOLVED | Noted: 2019-03-05 | Resolved: 2020-12-21

## 2021-02-03 NOTE — ED ADULT NURSE NOTE - CCCP TRG CHIEF CMPLNT
71 yo F hx COPD on home O2, HTN, HLD, chronic lymphedema BIBA for complaints of generalized weakness and shortness of breath. Patient lives with sister who was on her way to work and did not feel comfortable leaving patient alone at home today. Patient reports increasing SOB and weakness over the last few weeks. Increased swelling and pain to lower extremities. Denies focal numbness/tingling. No chest pain. No cough.
altered mental status

## 2021-02-25 NOTE — ED ADULT NURSE NOTE - WEIGHT IN LBS
[facility] [address] [facility]  Medication Management  ANTICOAGULATION    Referring Doctor: Adrian José INR: 2-3    TODAY'S INR: 1.6    WARFARIN Dosage: Patient will take warfarin 2 tablets for 5 mg today then continue warfarin 1.5 tablets for 3.75 mg MWF and whole 2.5 mg tablet all other days. INR (no units)   Date Value   01/14/2021 2.3   12/22/2020 2.6   12/21/2020 2.8   12/03/2020 1.9   10/15/2020 2.7   09/03/2020 2   07/23/2020 2.1     Medication changes:  none    Notes:    Fingerstick INR drawn per clinic protocol. Patient states no visible blood in urine and no black tarry stool. Denies any missed doses of warfarin. No change in other maintenance medications or in diet. Will recheck INR in 4 weeks. Patient acknowledges working in consult agreement with pharmacist as referred by his/her physician. Patient gets a feeling of total emptiness that comes and goes. She thinks it from how many people she has lost.  She can't get out and do things. She may be depressed. CLINICAL PHARMACY CONSULT: MED RECONCILIATION/REVIEW ADDENDUM    For Pharmacy Admin Tracking Only    PHSO: Yes  Total # of Interventions Recommended: 1  - Increased Dose #: 1  - Maintenance Safety Lab Monitoring #: 1  Recommended intervention potential cost savings:   Accepted intervention potential cost savings:    Total Interventions Accepted: 2  Time Spent (min): 30    Heath Oviedo PharmD
229

## 2021-06-05 NOTE — PATIENT PROFILE ADULT - NSPROPTRIGHTSUPPORTPERSON_GEN_A_NUR
[address]  Discharge- Austin Olson 6/26/1931, 78 y o  female MRN: [id number]  Unit/Bed#: E5 -01 Encounter: [id number]  Primary Care Provider: Myah Mai MD   Date and time admitted to hospital: 5/31/2021  6:50 AM    UTI (urinary tract infection)  Assessment & Plan  · Patient has had 5 days of treatment for UTI  She has multidrug resistance  I would stop antibiotics at this point she is going home with hospice  It likely was fully treated at this point  * Goals of care, counseling/discussion  Assessment & Plan  Patient with underlying history of dementia presenting with altered mental status  She was found to have UTI secondary to MDRO treated with Zosyn  Slurred speech with inability to swallow, encephalopathic and able to follow commands with CT scan suggestive of severe atrophy of the brain, unable to obtain MRI  Reported seizure-like activity at the nursing home  Underlying history of dementia  Evaluation by Nephrology for hyponatremia  Evaluation by Neurology for altered mental status, dysphagia, dysarthria    Extensive workup was complete  -CT scan x2 without acute abnormalities, severe other  -patient notes from geriatrician, underlying dementia without behavioral disturbances  -UTI with multi drug-resistant organism has been treated with dose  -reported event of macroglossia evaluated by ENT, tongue of within normal limits with excessive dry mouth  -remains confused unable to follow commands  -has failed speech evaluation  -has failed to improved with proposed treatment  -neurology does not think disease CVA or myasthenia gravis  -impression of worsening dementia with acute delirium, tried Zyprexa  Discussed with family, at this time, given lack of improvement despite 5 days of treatment they will do not want to pursue any further aggressive care  No feeding tube  Patient is already DNR/DNI  Family has elected for hospice care    Patient will be transferred back to nursing home with hospice care as soon as evaluation is completed      Discharging Physician / Practitioner: Merlin Sovereign, DO  PCP: Chriss Medina MD  Admission Date:   Admission Orders (From admission, onward)     Ordered        05/31/21 1011  Inpatient Admission  Once                   Discharge Date: 06/05/21    Resolved Problems  Date Reviewed: 6/4/2021    None            Consultations During Hospital Stay:  · Neurology  · Ear nose and throat      Procedures Performed:     · CT head      Reason for Admission:  Confusion, questionable seizure activity      Hospital Course:     Bhavani Stone is a 78 y o  female patient who originally presented to the hospital on 5/31/2021 due to confusion and questionable seizure activity  She has underlying severe dementia  She presented from the nursing home with questionable seizure activity  Also had a severely enlarged tongue  Workup was essentially negative  She did have a UTI  So perhaps this was contributing  But son clear also this was a true UTI  Family discussions were had and the family has elected for hospice care back at the nursing facility  She has significant brain it atrophy on CT head and has had longstanding dementia isn't and is continued to worsen  She is also not eating and drinking well per the son  We discussed that a PEG tube was not helpful to give her a significant recovery it would only prolong her life which right now she is severely demented living in the nursing facility  So we elected for no PEG tube and hospice care  I am in agreement with this decision  Please see above list of diagnoses and related plan for additional information         Condition at Discharge: stable       Discharge Day Visit / Exam:     Subjective:  No complaints  Ate little this am      Vitals: Blood Pressure: (!) 178/84 (06/05/21 0745)  Pulse: 102 (06/05/21 0745)  Temperature: 99 2 °F (37 3 °C) (06/05/21 0745)  Temp Source: Oral (06/05/21 0745)  Respirations: 18 (06/05/21 0745)  Weight - Scale: 70 8 kg (156 lb 1 4 oz) (05/31/21 0657)  SpO2: 93 % (06/05/21 0745)    Exam:     Physical Exam  Vitals signs and nursing note reviewed  HENT:      Head: Normocephalic and atraumatic  Eyes:      Pupils: Pupils are equal, round, and reactive to light  Cardiovascular:      Rate and Rhythm: Normal rate and regular rhythm  Heart sounds: No murmur  No friction rub  No gallop  Pulmonary:      Effort: Pulmonary effort is normal       Breath sounds: Normal breath sounds  No wheezing or rales  Abdominal:      General: Bowel sounds are normal       Palpations: Abdomen is soft  Tenderness: There is no abdominal tenderness  Musculoskeletal:      Right lower leg: No edema  Left lower leg: No edema            Discharge instructions/Information to patient and family:   See after visit summary for information provided to patient and family  Provisions for Follow-Up Care:  See after visit summary for information related to follow-up care and any pertinent home health orders  Disposition:     Other: Skilled nursing facility       Discharge Statement:  I spent 41 minutes discharging the patient  This time was spent on the day of discharge  I had direct contact with the patient on the day of discharge  Greater than 50% of the total time was spent examining patient, answering all patient questions, arranging and discussing plan of care with patient as well as directly providing post-discharge instructions  Additional time then spent on discharge activities  Discharge Medications:  See after visit summary for reconciled discharge medications provided to patient and family        ** Please Note: This note has been constructed using a voice recognition system ** same name as above

## 2021-06-20 NOTE — OCCUPATIONAL THERAPY INITIAL EVALUATION ADULT - PATIENT PROFILE REVIEW, REHAB EVAL
Letter by Qi Licea at      Author: Qi Licea Service: -- Author Type: --    Filed:  Encounter Date: 10/13/2020 Status: (Other)         Inocente Rios  [address]  [place] [address]      October 13, 2020      Dear Mr. Rios,    RE: Remote Results    We are writing to you regarding your recent Remote ICD check from home. Your transmission was received successfully. Battery status is satisfactory at this time.     Your results are within normal limits.    Your next device appointment will be a remote check on January 12, 2021; this will occur automatically.    To schedule or reschedule, please call [phone] and press 1.    NOTE: If you would like to do an extra transmission, please call [phone] and press 3 to speak to a nurse BEFORE transmitting. This ensures that the Device Clinic staff is aware of the reason you are sending a transmission, and can follow-up with you after it has been reviewed.    We will be checking your implanted device from home (remotely) every three months unless otherwise instructed. We will need to see you in the clinic at least once a year. You may need to be seen in the clinic sooner depending on the results of your check.    Please be aware:    The follow-up schedule is like a Physician prescription.    Your remote monitor is paired to your specific implanted device.      Sincerely,    [facility]        yes

## 2022-09-19 NOTE — PROGRESS NOTE ADULT - PROBLEM SELECTOR PLAN 6
Oriented - self; Oriented - place; Oriented - time Patient reports hx but not on meds. permissive htn for now. IV tylenol for headache   will montior vitals closely  DASH diet Patient reports hx but not on meds.   can resume bp meds now   IV tylenol for headache   will montior vitals closely

## 2022-10-11 NOTE — BEHAVIORAL HEALTH ASSESSMENT NOTE - NSBHCHARTREVIEWVS_PSY_A_CORE FT
52 y.o. M, PMH of HTN, HLD, comes in c/o chest discomfort which started today. Pt states he had a dream at night that something might happen in the pool today when he dives. When pt went to dive today his mask broke. Pt became concerned. Developed chest discomfort. Unsure if due to anxiety or CP. No SOB. NO fever/chills. No cough. No leg pain or swelling. On exam, pt in NAD, AAOx3, head NC/AT, CN II-XII intact, PEERL, EOMi, neck (-) midline tenderness, lungs CTA B/L, CV S1S2 regular, abdomen soft/NT/ND/(+)BS, ext (-) edema, motor 5/5x4, sensation intact, ambulating with steady gait. Will do labs, CXR, EKG and reevaluate. Vital Signs Last 24 Hrs  T(C): 36.5 (21 Feb 2019 10:04), Max: 36.5 (21 Feb 2019 10:04)  T(F): 97.7 (21 Feb 2019 10:04), Max: 97.7 (21 Feb 2019 10:04)  HR: 72 (21 Feb 2019 12:13) (67 - 73)  BP: 138/83 (21 Feb 2019 12:13) (138/83 - 220/84)  BP(mean): --  RR: 18 (21 Feb 2019 12:13) (16 - 18)  SpO2: 96% (21 Feb 2019 12:13) (96% - 100%)

## 2022-10-13 NOTE — CONSULT NOTE ADULT - ATTENDING COMMENTS
Patient asleep comfortably. When awakened, started having rocking of body statis that she is in significant pain.  Discussed that will need to work with PT and OT.  Will need follow up of her functional status to determine rehab dispo.
4
History and all imaging completed as of 5/28/2017 reviewed - no indication for extracranial ICA surgery for prevention of embolic stroke given distal ICA and MCA occlusions. You should discuss with neurosurgery any possible (unlikely) options for intervention.

## 2022-11-29 NOTE — H&P ADULT - I WAS PHYSICALLY PRESENT FOR THE KEY PORTIONS OF THE EVALUATION AND MANAGEMENT (E/M) SERVICE PROVIDED.  I AGREE WITH THE ABOVE HISTORY, PHYSICAL, AND PLAN WHICH I HAVE REVIEWED AND EDITED WHERE APPROPRIATE
General Sunscreen Counseling: I recommended a broad spectrum sunscreen with a SPF of 30 or higher. I explained that SPF 30 sunscreens block approximately 97 percent of the sun's harmful rays. Sunscreens should be applied at least 15 minutes prior to expected sun exposure and then every 2 hours after that as long as sun exposure continues. If swimming or exercising sunscreen should be reapplied every 45 minutes to an hour after getting wet or sweating. One ounce, or the equivalent of a shot glass full of sunscreen, is adequate to protect the skin not covered by a bathing suit. I also recommended a lip balm with a sunscreen as well. Sun protective clothing can be used in lieu of sunscreen but must be worn the entire time you are exposed to the sun's rays. Products Recommended: Cerave mineral sunscreen, Vanicream moisturizing ointment, and Vanicream HC twice a day for 3-4 days only. Detail Level: Generalized Statement Selected

## 2023-01-03 NOTE — DISCHARGE NOTE ADULT - PATIENT PORTAL LINK FT
Mom states pt with 101.1 temp and pulling at ears at day care today. States has runny nose x3-4 weeks. You can access the [facility] Patient Portal, offered by [facility], by registering with the following website: [email or web address]

## 2023-03-06 NOTE — ED PROVIDER NOTE - NEUROLOGICAL SPEECH
Medicare Annual Wellness Visit    Amarilis Wills is here for Medicare AWV    Assessment & Plan   Initial Medicare annual wellness visit    Recommendations for Preventive Services Due: see orders and patient instructions/AVS.  Recommended screening schedule for the next 5-10 years is provided to the patient in written form: see Patient Instructions/AVS.     Return for Medicare Annual Wellness Visit in 1 year. Subjective   The following acute and/or chronic problems were also addressed today:  Doing well  No new issues    Patient's complete Health Risk Assessment and screening values have been reviewed and are found in Flowsheets. The following problems were reviewed today and where indicated follow up appointments were made and/or referrals ordered. Positive Risk Factor Screenings with Interventions:               General HRA Questions:  Select all that apply: (!) New or Increased Pain    Pain Interventions:  Patient declined any further interventions or treatment          Vision Screen:  Do you have difficulty driving, watching TV, or doing any of your daily activities because of your eyesight?: No  Have you had an eye exam within the past year?: (!) No  No results found. Interventions:   Patient declines any further evaluation or treatment    Safety:  Do you have either shower bars, grab bars, non-slip mats or non-slip surfaces in your shower or bathtub?: (!) No  Interventions:  Patient declined any further interventions or treatment       CV Risk Counseling:  Patient was asked about his current diet and exercise habits, and personalized advice was provided regarding recommended lifestyle changes. Patient's individual cardiovascular disease risk factors, including advanced age (> 54 for men, > 72 for women) and male gender, were discussed, as well as the likely benefits of lifestyle changes.  Based upon patient's motivation to change his behavior, the following plan was agreed upon to work toward lowering cardiovascular disease risk: low saturated fat, low cholesterol diet and increase physical activity, as tolerated. Aspirin use for primary prevention of cardiovascular disease for men 45-79 and women 55-79: Not indicated. Educational materials for lifestyle changes were provided. Patient will follow-up in 1 year(s) with PCP. Provider spent 30 minutes counseling patient. Objective   Vitals:    03/06/23 0850   BP: 108/68   Pulse: 62   Temp: 98 °F (36.7 °C)   SpO2: 97%   Weight: 204 lb (92.5 kg)   Height: 6' (1.829 m)      Body mass index is 27.67 kg/m². General Appearance: alert and oriented to person, place and time, well developed and well- nourished, in no acute distress  Skin: warm and dry, no rash or erythema  Head: normocephalic and atraumatic  Eyes: pupils equal, round, and reactive to light, extraocular eye movements intact, conjunctivae normal  ENT: tympanic membrane, external ear and ear canal normal bilaterally, nose without deformity, nasal mucosa and turbinates normal without polyps  Neck: supple and non-tender without mass, no thyromegaly or thyroid nodules, no cervical lymphadenopathy  Pulmonary/Chest: clear to auscultation bilaterally- no wheezes, rales or rhonchi, normal air movement, no respiratory distress  Cardiovascular: normal rate, regular rhythm, normal S1 and S2, no murmurs, rubs, clicks, or gallops, distal pulses intact, no carotid bruits  Abdomen: soft, non-tender, non-distended, normal bowel sounds, no masses or organomegaly  Extremities: no cyanosis, clubbing or edema  Musculoskeletal: normal range of motion, no joint swelling, deformity or tenderness  Neurologic: reflexes normal and symmetric, no cranial nerve deficit, gait, coordination and speech normal       No Known Allergies  Prior to Visit Medications    Medication Sig Taking?  Authorizing Provider   fluticasone (FLONASE) 50 MCG/ACT nasal spray 2 sprays by Each Nostril route daily Yes Jesusita Vick, DO CareTeam (Including outside providers/suppliers regularly involved in providing care):   Patient Care Team:  Cinthia Sanz DO as PCP - General (Family Medicine)  Cinthia Sanz DO as PCP - Empaneled Provider  Karl Hanson MD as Surgeon (Urology)     Reviewed and updated this visit:  Tobacco  Allergies  Meds  Problems  Med Hx  Surg Hx  Soc Hx  Fam Hx               Festus Ervin DO expressive asphasia, dysarthria

## 2023-10-24 NOTE — DISCHARGE NOTE ADULT - NS AS DC INITIAL [facility]  KNWN
Harika Munoz (:  1950) is a 74 Everardo Tam at [facility]. Also by Dr. Samia Marin patient Not Assessed

## 2024-04-02 NOTE — ED PROVIDER NOTE - CONSTITUTIONAL, MLM
Problem: Discharge Planning  Goal: Discharge to home or other facility with appropriate resources  4/2/2024 1305 by Jenae Bey RN  Outcome: Completed  4/2/2024 0126 by Sol Mederos RN  Outcome: Progressing  Flowsheets (Taken 4/1/2024 2134)  Discharge to home or other facility with appropriate resources: Identify barriers to discharge with patient and caregiver     Problem: Pain  Goal: Verbalizes/displays adequate comfort level or baseline comfort level  4/2/2024 1305 by Jenae Bey RN  Outcome: Completed  4/2/2024 0126 by Sol Mederos RN  Outcome: Progressing     Problem: ABCDS Injury Assessment  Goal: Absence of physical injury  4/2/2024 1305 by Jenae Bey RN  Outcome: Completed  4/2/2024 0126 by Sol Mederos RN  Outcome: Progressing     Problem: Respiratory - Adult  Goal: Achieves optimal ventilation and oxygenation  4/2/2024 1305 by Jenae Bey RN  Outcome: Completed  4/2/2024 0126 by Sol Mederos RN  Reactivated     Problem: Cardiovascular - Adult  Goal: Maintains optimal cardiac output and hemodynamic stability  4/2/2024 1305 by Jenae Bey RN  Outcome: Completed  4/2/2024 0126 by Sol Mederos RN  Reactivated     Problem: Metabolic/Fluid and Electrolytes - Adult  Goal: Electrolytes maintained within normal limits  4/2/2024 1305 by Jenae Bey RN  Outcome: Completed  4/2/2024 0126 by Sol Mederos RN  Reactivated      normal... Awake, alert, oriented to person, place, time/situation and in no apparent distress.

## 2024-05-17 NOTE — OCCUPATIONAL THERAPY INITIAL EVALUATION ADULT - IMPAIRMENTS CONTRIBUTING IMPAIRED BED MOBILITY, REHAB EVAL
Him/He impaired balance/cognition/impaired coordination/abnormal muscle tone/decreased flexibility/decreased strength

## 2024-10-30 ENCOUNTER — EMERGENCY (EMERGENCY)
Facility: HOSPITAL | Age: 54
LOS: 1 days | Discharge: HOME | End: 2024-10-30
Attending: EMERGENCY MEDICINE
Payer: MEDICAID

## 2024-10-30 VITALS
RESPIRATION RATE: 16 BRPM | HEART RATE: 70 BPM | TEMPERATURE: 98 F | SYSTOLIC BLOOD PRESSURE: 168 MMHG | DIASTOLIC BLOOD PRESSURE: 90 MMHG | OXYGEN SATURATION: 98 %

## 2024-10-30 LAB
ALBUMIN SERPL ELPH-MCNC: 3.8 G/DL — SIGNIFICANT CHANGE UP (ref 3.3–5.2)
ALP SERPL-CCNC: 57 U/L — SIGNIFICANT CHANGE UP (ref 40–120)
ALT FLD-CCNC: 9 U/L — SIGNIFICANT CHANGE UP
ANION GAP SERPL CALC-SCNC: 12 MMOL/L — SIGNIFICANT CHANGE UP (ref 5–17)
AST SERPL-CCNC: 19 U/L — SIGNIFICANT CHANGE UP
BASOPHILS # BLD AUTO: 0.03 K/UL — SIGNIFICANT CHANGE UP (ref 0–0.2)
BASOPHILS NFR BLD AUTO: 0.4 % — SIGNIFICANT CHANGE UP (ref 0–2)
BILIRUB SERPL-MCNC: <0.2 MG/DL — LOW (ref 0.4–2)
BUN SERPL-MCNC: 11 MG/DL — SIGNIFICANT CHANGE UP (ref 8–20)
CALCIUM SERPL-MCNC: 8.8 MG/DL — SIGNIFICANT CHANGE UP (ref 8.4–10.5)
CHLORIDE SERPL-SCNC: 104 MMOL/L — SIGNIFICANT CHANGE UP (ref 96–108)
CO2 SERPL-SCNC: 23 MMOL/L — SIGNIFICANT CHANGE UP (ref 22–29)
CREAT SERPL-MCNC: 0.54 MG/DL — SIGNIFICANT CHANGE UP (ref 0.5–1.3)
EGFR: 108 ML/MIN/1.73M2 — SIGNIFICANT CHANGE UP
EOSINOPHIL # BLD AUTO: 0.2 K/UL — SIGNIFICANT CHANGE UP (ref 0–0.5)
EOSINOPHIL NFR BLD AUTO: 2.9 % — SIGNIFICANT CHANGE UP (ref 0–6)
GLUCOSE SERPL-MCNC: 115 MG/DL — HIGH (ref 70–99)
HCT VFR BLD CALC: 36.6 % — SIGNIFICANT CHANGE UP (ref 34.5–45)
HGB BLD-MCNC: 11.7 G/DL — SIGNIFICANT CHANGE UP (ref 11.5–15.5)
IMM GRANULOCYTES NFR BLD AUTO: 0.1 % — SIGNIFICANT CHANGE UP (ref 0–0.9)
LYMPHOCYTES # BLD AUTO: 2.61 K/UL — SIGNIFICANT CHANGE UP (ref 1–3.3)
LYMPHOCYTES # BLD AUTO: 38.2 % — SIGNIFICANT CHANGE UP (ref 13–44)
MCHC RBC-ENTMCNC: 28.2 PG — SIGNIFICANT CHANGE UP (ref 27–34)
MCHC RBC-ENTMCNC: 32 G/DL — SIGNIFICANT CHANGE UP (ref 32–36)
MCV RBC AUTO: 88.2 FL — SIGNIFICANT CHANGE UP (ref 80–100)
MONOCYTES # BLD AUTO: 0.36 K/UL — SIGNIFICANT CHANGE UP (ref 0–0.9)
MONOCYTES NFR BLD AUTO: 5.3 % — SIGNIFICANT CHANGE UP (ref 2–14)
NEUTROPHILS # BLD AUTO: 3.62 K/UL — SIGNIFICANT CHANGE UP (ref 1.8–7.4)
NEUTROPHILS NFR BLD AUTO: 53.1 % — SIGNIFICANT CHANGE UP (ref 43–77)
PLATELET # BLD AUTO: 191 K/UL — SIGNIFICANT CHANGE UP (ref 150–400)
POTASSIUM SERPL-MCNC: 4.1 MMOL/L — SIGNIFICANT CHANGE UP (ref 3.5–5.3)
POTASSIUM SERPL-SCNC: 4.1 MMOL/L — SIGNIFICANT CHANGE UP (ref 3.5–5.3)
PROT SERPL-MCNC: 6.8 G/DL — SIGNIFICANT CHANGE UP (ref 6.6–8.7)
RBC # BLD: 4.15 M/UL — SIGNIFICANT CHANGE UP (ref 3.8–5.2)
RBC # FLD: 14.1 % — SIGNIFICANT CHANGE UP (ref 10.3–14.5)
SODIUM SERPL-SCNC: 139 MMOL/L — SIGNIFICANT CHANGE UP (ref 135–145)
WBC # BLD: 6.83 K/UL — SIGNIFICANT CHANGE UP (ref 3.8–10.5)
WBC # FLD AUTO: 6.83 K/UL — SIGNIFICANT CHANGE UP (ref 3.8–10.5)

## 2024-10-30 PROCEDURE — 99223 1ST HOSP IP/OBS HIGH 75: CPT

## 2024-10-30 PROCEDURE — 90792 PSYCH DIAG EVAL W/MED SRVCS: CPT | Mod: 95

## 2024-10-30 PROCEDURE — 71045 X-RAY EXAM CHEST 1 VIEW: CPT | Mod: 26

## 2024-10-30 RX ORDER — MIRTAZAPINE 30 MG/1
15 TABLET ORAL ONCE
Refills: 0 | Status: COMPLETED | OUTPATIENT
Start: 2024-10-30 | End: 2024-10-31

## 2024-10-30 RX ORDER — RISPERIDONE 3 MG/1
1 TABLET, FILM COATED ORAL ONCE
Refills: 0 | Status: COMPLETED | OUTPATIENT
Start: 2024-10-30 | End: 2024-10-31

## 2024-10-30 RX ORDER — VALPROIC ACID 250 MG/5ML
500 SOLUTION ORAL ONCE
Refills: 0 | Status: COMPLETED | OUTPATIENT
Start: 2024-10-30 | End: 2024-10-31

## 2024-10-30 NOTE — ED BEHAVIORAL HEALTH ASSESSMENT NOTE - OTHER
No one peers at facility [facility] [phone] Mostly linear but difficult to assess 2/2 expressive aphasia

## 2024-10-30 NOTE — ED ADULT TRIAGE NOTE - CHIEF COMPLAINT QUOTE
arrive to ED sent from [facility] for decrease po intake, claiming shalini telling her not to eat. extensive med history including aphasia and tbi. changed into yellow gown arrive to ED sent from [facility] for decrease po intake, claiming shalini telling her not to eat x 1 month, recent visit for same.  extensive med history including aphasia and tbi. changed into yellow gown

## 2024-10-30 NOTE — ED PROVIDER NOTE - PHYSICAL EXAMINATION
Constitutional - well-developed.   Head - NCAT. Airway patent.   Eyes - PERRL.   CV - RRR. no murmur. no edema.   Pulm - CTAB.   Abd - soft, nt. no rebound. no guarding.   Neuro - A&Ox1. strength 5/5 x4. sensation intact x4. normal gait.   Skin - No rash. .  MSK - normal ROM.

## 2024-10-30 NOTE — ED BEHAVIORAL HEALTH ASSESSMENT NOTE - SUMMARY
55y/o single Female, domic[facility] at [facility], per fernanda has diagnoses of Unspecified bipolar, MDD, neurocog, unspecified psychosis, delirium, schizophrenia, no inpatient psych admissions per fernanda, in treatment with psychiatrist at [facility] W/ Dr. Grajeda q week on Fridays, no known suicide attempts, no nssib, hx of crack cocaine abuse, and pmhx of  DM, HTN, CVA, dysarthrias and diplopia, large left MCA territory infarct biba from [facility] for reportedly not eating / drinking / taking medications for the last one month.    On assessment, pt is received in stretcher awake, alert, calm, somewhat confused with telepsychiatry concept but engages with writer. She has expressive aphasia  and is able to answer certain qs when given multiple options. She is oriented to self, knows she's in the hospital, says the date is Oct 30, 2026, believes she came in from home. Does not know why she is here.     She is currently denying any acute sxs of depression, haroon, and psychosis including SI/HI/AVH. She states her communication with shalini is limited to when she is praying and gets his messages and denies any gerardo AVH in general or relating to [place]. Her thought process is mostly linear given what could be assessed given her expressive aphasia. She does not appear internally preoccupied, or responding to internal stimuli. Per staff pt ate a sandwich in the ED and pt confirms she has been eating although she knows shalini wouldn't want her to. Per [facility] pt has been going to a couple EDs in the last month and medically admitted (alen missyken was most recently admitted to [facility] for UTI x 3 days), after which pt returned back to [facility] at baseline. [facility] nursing coordinator of pt's unit will be back in AM for more specific info about tonight's presentation.    PLAN:  - Safety: Keep pt on 1:1 in the ED  - Labs: UA, Utox, TSH, b12, folate, can consider CTH   - Medications: Would resume pt's psychotropics   Remeron 15mg qhs, depakote 500 BID, risperidone 1mg BID   agitation: Risperdal 0.5mg po q6h PRN   anxiety: ativan 0.5mg po q6h PRN   - Dispo: Pt is NOT psychiatrically cleared, pending collateral information from facility  May consider medical admission for further work up / management with [facility] psych to follow

## 2024-10-30 NOTE — ED BEHAVIORAL HEALTH ASSESSMENT NOTE - RISK ASSESSMENT
Risk: Cognitive limitations/difficulty expressing emotions, non compliant w/ tx, limited inishgt,   protective: Denying si/hi/avh, no known inpatient admissions, no known suicide attempts, no nssib, residential stability, sobriety, in treatment at [facility],

## 2024-10-30 NOTE — ED ADULT NURSE NOTE - CHIEF COMPLAINT QUOTE
arrive to ED sent from [facility] for decrease po intake, claiming shalini telling her not to eat x 1 month, recent visit for same.  extensive med history including aphasia and tbi. changed into yellow gown

## 2024-10-30 NOTE — ED ADULT NURSE NOTE - OBJECTIVE STATEMENT
Pt sent from nursing home for decreased po intake for a month. Axo2. Respirations even and unlabored. Pt states shalini is telling her not to eat. Difficult to understand. Hx TBI, aphasia.

## 2024-10-30 NOTE — ED PROVIDER NOTE - OBJECTIVE STATEMENT
Pt krunal  55 yo F sent in from [facility] for not tolerating meds and not eating/drinking.  Pt gives no hx.  According to Dr. Davies, [facility] medical director, Pt has been in and out of ERs frequently. PT frequently refusing to eat or drink or take any medications.  Pt's psychologist recommended inpatient admission. Pt just continues to state here that shalini does not want her to eat or drink.

## 2024-10-30 NOTE — ED BEHAVIORAL HEALTH ASSESSMENT NOTE - HPI (INCLUDE ILLNESS QUALITY, SEVERITY, DURATION, TIMING, CONTEXT, MODIFYING FACTORS, ASSOCIATED SIGNS AND SYMPTOMS)
53y/o single Female, [facility] of Unspecified bipolar, MDD, neurocog, unspecified psychosis, delirium, schizophrenia, no inpatient psych admissions per psycken, in treatment with psychiatrist at [facility] W/ Dr. Grajeda q week on Fridays,   with Hx of DM, HTN, CVA, dysarthrias and diplopia, large left MCA territory infarct [facility] 53y/[facility] single Female, [facility], per psyckes has diagnoses of Unspecified bipolar, MDD, neurocog, unspecified psychosis, delirium, schizophrenia, no inpatient psych admissions per psmarily, in treatment with psychiatrist at [facility] W/ Dr. Grajeda q week on Fridays, no known suicide attempts, no nssib, hx of crack cocaine abuse, and pmhx of  DM, HTN, CVA, dysarthrias and diplopia, large left MCA territory infarct biba from [facility] for reportedly not eating / drinking / taking medications for the last one month.    On assessment, pt is received in stretcher awake, alert, calm, somewhat confused with telepsychiatry concept but engages with writer. She has expressive aphasia and is able to 53y/o single Female, domiciled at [facility] x2 years, per psyckes has diagnoses of Unspecified bipolar, MDD, neurocog, unspecified psychosis, delirium, schizophrenia, no inpatient psych admissions per fernanda, in treatment with psychiatrist at [facility] W/ Dr. Grajeda q week on Fridays, no known suicide attempts, no nssib, hx of crack cocaine abuse, and pmhx of  DM, HTN, CVA, dysarthrias and diplopia, large left MCA territory infarct biba from [facility] for reportedly not eating / drinking / taking medications for the last one month.    On assessment, pt is received in stretcher awake, alert, calm, somewhat confused with telepsychiatry concept but engages with writer. She has expressive aphasia  and is able to answer certain qs when given multiple options. She is oriented to self, knows she's in the hospital, says the date is Oct 30, 2026, believes she came in from home. Does not know why she is here.     She largely denies any acute  sxs of depression including depressed mood,  having issues with sleep or energy. When talking about appetite, she becomes tearful and states "I'm not supposed to be eating or drinking or taking medication because it's what shalini would want but I can't control myself and I've been eating like crazy". Pt denies seeing or hearing shalini "I only pray to him and it's during those times when I feel I receive his message". Denies any gerardo hallucinations in general or specifically relating to shalini/Cheondoism themes. Asked patient where she got the information from regarding shalini not wanting her to eat/drink/take medicine and she state "I can't tell you now but if you're in person I would tell you more about it".    She denies any current substance use. Reports she has not been taking any medication but cannot state for how long this has been going on.    Writer spoke with overnight nursing supervisor Tara from [facility] [phone]  Pt has been at [facility] for at least two years.  States that for the last one month, pt has been more religiously preoccupied about shalini and not eating/drinking/taking medicine. Was sent to two EDs in the meantime and was medically admitted at [facility] for a few days and discharged back to facility. Afterwards pt had been eating/drinking/taking medicine. Tara does not know when pt more recently decompensated again.   States that at baseline pt has word finding difficulty. Reports she has been sleeping. Follows with psychiatrist at facility on Fridays Dr. Grajeda (or Sharri?). Denies any hx of suicide attempts or nssib. Denies pt has been aggressive toward staff or peers.   Tara does not have specific information about how long pt was not eating for this most recent time or if anything else happened tonight that triggered ems being contacted. She wants us to speak with the charge nursing coordinator of the unit tomorrow morning, Rossy Navas.

## 2024-10-30 NOTE — ED CDU PROVIDER INITIAL DAY NOTE - OBJECTIVE STATEMENT
Pt krunal  55 yo F sent in from [facility] for not tolerating meds and not eating/drinking.  Pt gives no hx.  According to Dr. Davies, [facility] medical director, Pt has been in and out of ERs frequently. PT frequently refusing to eat or drink or take any medications.  Pt's psychologist recommended inpatient admission. Pt just continues to state here that shalini does not want her to eat or drink.

## 2024-10-30 NOTE — ED PROVIDER NOTE - PROGRESS NOTE DETAILS
Ferny ATTG spoke w/ tele psych wants to keep in the AM for re-eval once they speak w/ supervisor at facility Ferny ATTG spoke w/ tele psych wants to keep in the AM for re-eval once they speak w/ supervisor at facility      Spoke with telepsych recommended repeat evaluation in the morning given they want speak to the supervisor of the facility to get some more collateral information

## 2024-10-30 NOTE — ED BEHAVIORAL HEALTH ASSESSMENT NOTE - CURRENT MEDICATION
Clopidogrel 75 qam, aspirin 81 qam, amlodipine 10 qam, senna 17.2 prn, hydralazine 10 BID, remeron 15 qhs, risperidone 1mg BID, valproic acid 500 BID,

## 2024-10-31 VITALS
OXYGEN SATURATION: 99 % | RESPIRATION RATE: 18 BRPM | SYSTOLIC BLOOD PRESSURE: 127 MMHG | DIASTOLIC BLOOD PRESSURE: 76 MMHG | HEART RATE: 63 BPM | TEMPERATURE: 98 F

## 2024-10-31 DIAGNOSIS — R41.0 DISORIENTATION, UNSPECIFIED: ICD-10-CM

## 2024-10-31 LAB
APPEARANCE UR: CLEAR — SIGNIFICANT CHANGE UP
BACTERIA # UR AUTO: ABNORMAL /HPF
BILIRUB UR-MCNC: NEGATIVE — SIGNIFICANT CHANGE UP
CAST: 0 /LPF — SIGNIFICANT CHANGE UP (ref 0–4)
COLOR SPEC: YELLOW — SIGNIFICANT CHANGE UP
DIFF PNL FLD: NEGATIVE — SIGNIFICANT CHANGE UP
GLUCOSE UR QL: NEGATIVE MG/DL — SIGNIFICANT CHANGE UP
KETONES UR-MCNC: NEGATIVE MG/DL — SIGNIFICANT CHANGE UP
LEUKOCYTE ESTERASE UR-ACNC: ABNORMAL
NITRITE UR-MCNC: NEGATIVE — SIGNIFICANT CHANGE UP
PH UR: 8 — SIGNIFICANT CHANGE UP (ref 5–8)
PROT UR-MCNC: NEGATIVE MG/DL — SIGNIFICANT CHANGE UP
RBC CASTS # UR COMP ASSIST: 1 /HPF — SIGNIFICANT CHANGE UP (ref 0–4)
SP GR SPEC: 1.01 — SIGNIFICANT CHANGE UP (ref 1–1.03)
SQUAMOUS # UR AUTO: 1 /HPF — SIGNIFICANT CHANGE UP (ref 0–5)
UROBILINOGEN FLD QL: 0.2 MG/DL — SIGNIFICANT CHANGE UP (ref 0.2–1)
WBC UR QL: 14 /HPF — HIGH (ref 0–5)

## 2024-10-31 PROCEDURE — 93010 ELECTROCARDIOGRAM REPORT: CPT

## 2024-10-31 PROCEDURE — 85025 COMPLETE CBC W/AUTO DIFF WBC: CPT

## 2024-10-31 PROCEDURE — 87086 URINE CULTURE/COLONY COUNT: CPT

## 2024-10-31 PROCEDURE — 99239 HOSP IP/OBS DSCHRG MGMT >30: CPT

## 2024-10-31 PROCEDURE — 87186 SC STD MICRODIL/AGAR DIL: CPT

## 2024-10-31 PROCEDURE — 80053 COMPREHEN METABOLIC PANEL: CPT

## 2024-10-31 PROCEDURE — 90792 PSYCH DIAG EVAL W/MED SRVCS: CPT

## 2024-10-31 PROCEDURE — 36415 COLL VENOUS BLD VENIPUNCTURE: CPT

## 2024-10-31 PROCEDURE — 71045 X-RAY EXAM CHEST 1 VIEW: CPT

## 2024-10-31 PROCEDURE — 99283 EMERGENCY DEPT VISIT LOW MDM: CPT | Mod: 25

## 2024-10-31 PROCEDURE — 81001 URINALYSIS AUTO W/SCOPE: CPT

## 2024-10-31 PROCEDURE — 93005 ELECTROCARDIOGRAM TRACING: CPT

## 2024-10-31 PROCEDURE — G0378: CPT

## 2024-10-31 RX ORDER — LORAZEPAM 2 MG
0.5 TABLET ORAL ONCE
Refills: 0 | Status: DISCONTINUED | OUTPATIENT
Start: 2024-10-31 | End: 2024-10-31

## 2024-10-31 RX ORDER — CEPHALEXIN 125 MG/5ML
500 SUSPENSION, RECONSTITUTED, ORAL (ML) ORAL
Refills: 0 | Status: ACTIVE | OUTPATIENT
Start: 2024-10-31 | End: 2024-11-05

## 2024-10-31 RX ADMIN — VALPROIC ACID 500 MILLIGRAM(S): 250 SOLUTION ORAL at 06:43

## 2024-10-31 RX ADMIN — MIRTAZAPINE 15 MILLIGRAM(S): 30 TABLET ORAL at 06:42

## 2024-10-31 RX ADMIN — Medication 500 MILLIGRAM(S): at 14:27

## 2024-10-31 RX ADMIN — RISPERIDONE 1 MILLIGRAM(S): 3 TABLET, FILM COATED ORAL at 06:42

## 2024-10-31 NOTE — ED BEHAVIORAL HEALTH PROGRESS NOTE - DETAILS:
No acute events overnight. No PRNs given. Standing meds given at 0600. Pt seen at bedside, awake, calm, noted to have expressive aphasia but is able to answer questions when presented options as multiple choice. Pt oriented to self and is able to state "October 31" unable to recall year. Pt not oriented to place or situation. Pt states she has been staying at [facility] for 5 years. Per chart review, pt appears to have been religiously preoccupied about [place] and not eating/drinking/taking medicine, pt was seen by telepsych yesterday stating "I'm not supposed to be eating or drinking or taking medication because it's what [place] would want, but I can't control myself and I've been eating like crazy", denying any gerardo AVH, stating "I only pray to him and it's during those times when I feel I receive his message." Today at bedside pt corroborates such statements, and adamantly stated "I am not delusional". When asked about appetite, pt states she has been eating normally and "will probably eat this entire plate, I'm so hungry". Per ED staff, pt has been eating and demonstrating normal appetite since arrival.  No acute events overnight. No PRNs given. Standing meds given at 0600. Pt seen at bedside, awake, calm, noted to have expressive aphasia but is able to answer questions when presented options as multiple choice. Pt oriented to self and is able to state "October 31" unable to recall year. Pt not oriented to place or situation. Pt states she has been staying at [facility] for 5 years. Per chart review, pt appears to have been religiously preoccupied about [place] and not eating/drinking/taking medicine, pt was seen by telepsych yesterday stating "I'm not supposed to be eating or drinking or taking medication because it's what [place] would want, but I can't control myself and I've been eating like crazy", denying any gerardo AVH, stating "I only pray to him and it's during those times when I feel I receive his message." Today at bedside pt corroborates such statements and when asked about the delusions, pt adamantly stated "I am not delusional" and states we could get the information we needed from the man she spoke to yesterday (referring to telepsych). When asked about appetite, pt states she has been eating normally and "will probably eat this entire plate, I'm so hungry". Per ED staff, pt has been eating without difficulty and demonstrating normal appetite since arrival.  No acute events overnight. No PRNs given. Standing meds given at 0600. Pt seen at bedside, awake, calm, noted to have expressive aphasia but is able to answer questions when presented options as multiple choice. Pt oriented to self and is able to state "October 31" unable to recall year. Pt not oriented to place or situation. Pt states she has been staying at [facility] for 5 years. Per chart review, pt appears to have been religiously preoccupied about [place] and not eating/drinking/taking medicine, pt was seen by telepsych yesterday stating "I'm not supposed to be eating or drinking or taking medication because it's what [place] would want, but I can't control myself and I've been eating like crazy", denying any gerardo AVH, stating "I only pray to him and it's during those times when I feel I receive his message." Today at bedside pt corroborates such statements and when asked about the delusions, pt adamantly stated "I am not delusional" and states we could get the information we needed from the man she spoke to yesterday (referring to telepsych). When asked about appetite, pt states she has been eating normally and "will probably eat this entire plate, I'm so hungry". Per ED staff, pt has been eating without difficulty and demonstrating normal appetite since arrival. Denies SI/HI/AVH.  Of not ua resulted pos for UTI, AB started in ED.

## 2024-10-31 NOTE — CHART NOTE - NSCHARTNOTEFT_GEN_A_CORE
SW Note: Notified by Psych NP Cr Joe that the pt has been cleared for D/C back to her [facility]. Cr stated she provided handoff report to RNS Rossy Goldberg at the [facility]. I spoke with Gumaro from [facility] admissions and confirmed pt can return. ED provider made aware and cleared pt for D/C. [facility] being [facility]. [facility] letter provided. Notified pts [facility]. Alar Island Pedicle Flap Text: The defect edges were debeveled with a #15 scalpel blade.  Given the location of the defect, shape of the defect and the proximity to the alar rim an island pedicle advancement flap was deemed most appropriate.  Using a sterile surgical marker, an appropriate advancement flap was drawn incorporating the defect, outlining the appropriate donor tissue and placing the expected incisions within the nasal ala running parallel to the alar rim. The area thus outlined was incised with a #15 scalpel blade.  The skin margins were undermined minimally to an appropriate distance in all directions around the primary defect and laterally outward around the island pedicle utilizing iris scissors.  There was minimal undermining beneath the pedicle flap.

## 2024-10-31 NOTE — ED BEHAVIORAL HEALTH PROGRESS NOTE - NSBHATTESTAPPBILLTIME_PSY_A_CORE
I attest my time as CHARU is greater than 50% of the total combined time spent on qualifying patient care activities. I have reviewed and verified the documentation.

## 2024-10-31 NOTE — ED BEHAVIORAL HEALTH PROGRESS NOTE - NSICDXBHTERTIARYDX_PSY_ALL_CORE
Physical Therapy Visit    Visit Type: Daily Treatment Note  Visit: 7  Referring Provider: OSWALDO Ray  Medical Diagnosis (from order): Diagnosis Information    Diagnosis  611.71 (ICD-9-CM) - N64.4 (ICD-10-CM) - Breast pain, left         SUBJECTIVE                                                                                                               Patient reports unfortunately her pulsing pain at lower left rib did not change, she will be having a scan done as she is worried about traveling out of the country in a few weeks. Her initial pain/tightness that she came to PT for in left chest and breast is significantly improved. She is able to stretch now without limitation. She is  to pressure, \"like a deep bruise\" but this is somewhat less intense than it used to be as well. Patient feels she will be ready to try independent management of symptoms after next visit.   Functional Change: Able to do gentle hugs now  Less waking with sleeping      OBJECTIVE                                                                                                                         Palpation  Screen for cording: no cording visible or palpable in left axilla                 Treatment     Therapeutic Exercise  Passive range left shoulder flexion, scaption, horizontal abduction     Manual Therapy   Soft tissue mobilization to left breast: superior, inferior, lateral and medial aspects, lateral torso with myofascial release in direction of decreased tissue mobility. Also performed to, left axilla, left upper arm to thorax bilateral pull.          Skilled input: verbal instruction/cues, tactile instruction/cues and as detailed above    Writer verbally educated and received verbal consent for hand placement, positioning of patient, and techniques to be performed today from patient for clothing adjustments for techniques, therapist position for techniques and hand placement and palpation for techniques  as described above and how they are pertinent to the patient's plan of care.    Home Exercise Program  Stretching as previous-pectoralis door stretch, overhead stretch, open book  Self soft tissue mobilization      ASSESSMENT                                                                                                            Patient overall has made good progress with symptom relief, able to now reach with full range of motion with far less discomfort than previous. Her new \"pulsing\" pain under left ribs may not be related to scar tightness, will be having imaging for clarification. Patient likely appropriate to discharge after next session.     PLAN                                                                                                                           Suggestions for next session as indicated: Progress per plan of care       Therapy procedure time and total treatment time can be found documented on the Time Entry flowsheet     R/O Subacute delirium   R41.0

## 2024-10-31 NOTE — ED BEHAVIORAL HEALTH PROGRESS NOTE - OTHER
Decreased strength in left arm, likely secondary to h/o multiple CVAs difficult to assess d/t expressive aphasia  chronically impaired and limited due to dementia denies Hindu delusions though admits to close relationship with God who "speaks to her" expressive Aphasia s/p stroke aphasia contracture left arm

## 2024-10-31 NOTE — ED BEHAVIORAL HEALTH PROGRESS NOTE - STANDING MEDS RECEIVED IN ED DETAILS FREE TEXT
Risperdal 1mg PO BID  Remeron 15mg PO qhs  Valproic acid syrup 500mg PO BID Risperdal 1mg PO BID  Remeron 15mg PO qhs  Valproic acid syrup 500mg PO BID  cephalexin 500 milliGRAM(s) Oral two times a day

## 2024-10-31 NOTE — ED BEHAVIORAL HEALTH PROGRESS NOTE - CASE SUMMARY/FORMULATION (CLEARLY DOCUMENT RATIONALE FOR DISPOSITION CHANGE)
No acute events overnight. No PRNs given. Standing meds given at 0600. Pt seen at bedside, awake, calm, noted to have expressive aphasia but is able to answer questions when presented options as multiple choice. Pt oriented to self and is able to state "October 31" unable to recall year. Pt not oriented to place or situation. Pt states she has been staying at [facility] for 5 years. Per chart review, pt appears to have been religiously preoccupied about [place] and not eating/drinking/taking medicine, pt was seen by telepsych yesterday stating "I'm not supposed to be eating or drinking or taking medication because it's what [place] would want, but I can't control myself and I've been eating like crazy", denying any gerardo AVH, stating "I only pray to him and it's during those times when I feel I receive his message." Today at bedside pt corroborates such statements, and adamantly stated "I am not delusional". When asked about appetite, pt states she has been eating normally and "will probably eat this entire plate, I'm so hungry". Per ED staff, pt has been eating and demonstrating normal appetite since arrival.     UA demonstrates UTI.     PLAN:  - Safety: Keep pt on 1:1 in the ED  - Medications: Would resume pt's psychotropics   Remeron 15mg qhs, depakote 500 BID, risperidone 1mg BID   agitation: Risperdal 0.5mg po q6h PRN   anxiety: ativan 0.5mg po q6h PRN   - Pt presentation consistent with delirium in the setting of UTI. Consider delirium superimposed on dementia.  - Pt does not currently meet criteria for inpatient admission.  No acute events overnight. No PRNs given. Standing meds given at 0600. Pt seen at bedside, awake, calm, noted to have expressive aphasia but is able to answer questions when presented options as multiple choice. Pt oriented to self and is able to state "October 31" unable to recall year. Pt not oriented to place or situation. Pt states she has been staying at [facility] for 5 years. Per chart review, pt appears to have been religiously preoccupied about [place] and not eating/drinking/taking medicine, pt was seen by telepsych yesterday stating "I'm not supposed to be eating or drinking or taking medication because it's what [place] would want, but I can't control myself and I've been eating like crazy", denying any gerardo AVH, stating "I only pray to him and it's during those times when I feel I receive his message." Today at bedside pt corroborates such statements, and adamantly stated "I am not delusional". When asked about appetite, pt states she has been eating normally and "will probably eat this entire plate, I'm so hungry". Per ED staff, pt has been eating and demonstrating normal appetite since arrival.     UA demonstrates UTI.     PLAN:  - Safety: Keep pt on 1:1 in the ED  - Medications: Would resume pt's psychotropics   Remeron 15mg qhs, depakote 500 BID, risperidone 1mg BID   agitation: Risperdal 0.5mg po q6h PRN   anxiety: ativan 0.5mg po q6h PRN   - Pt presentation consistent with delirium in the setting of UTI. Consider delirium superimposed on dementia.  - Pt does not currently meet criteria for inpatient admission. Acute presentation likely secondary to UTI rather than a psychotic process.  No acute events overnight. No PRNs given. Standing meds given at 0600. Pt seen at bedside, awake, calm, noted to have expressive aphasia but is able to answer questions when presented options as multiple choice. Pt oriented to self and is able to state "October 31" unable to recall year. Pt not oriented to place or situation. Pt states she has been staying at [facility] for 5 years. Per chart review, pt appears to have been religiously preoccupied about [place] and not eating/drinking/taking medicine, pt was seen by telepsych yesterday stating "I'm not supposed to be eating or drinking or taking medication because it's what [place] would want, but I can't control myself and I've been eating like crazy", denying any gerardo AVH, stating "I only pray to him and it's during those times when I feel I receive his message." Today at bedside pt corroborates such statements and when asked about the delusions, pt adamantly stated "I am not delusional" and states we could get the information we needed from the man she spoke to yesterday (referring to telepsych). When asked about appetite, pt states she has been eating normally and "will probably eat this entire plate, I'm so hungry". Per ED staff, pt has been eating without difficulty and demonstrating normal appetite since arrival.     UA demonstrates UTI.     PLAN:  - Safety: Keep pt on 1:1 in the ED  - Medications: Would resume pt's psychotropics   Remeron 15mg qhs, depakote 500 BID, risperidone 1mg BID   agitation: Risperdal 0.5mg po q6h PRN   anxiety: ativan 0.5mg po q6h PRN   - Pt presentation consistent with delirium in the setting of UTI. Consider delirium superimposed on dementia.  - Pt does not currently meet criteria for inpatient admission. Acute presentation likely secondary to UTI rather than a psychotic process.  No acute events overnight. No PRNs given. Standing meds given at 0600. Pt seen at bedside, awake, calm, noted to have expressive aphasia but is able to answer questions when presented options as multiple choice. Pt oriented to self and is able to state "October 31" unable to recall year. Pt not oriented to place or situation.   denying any gerardo AVH, stating "I only pray to him and it's during those times when I feel I receive his message." Today at bedside pt corroborates such statements and when asked about the delusions, pt adamantly stated "I am not delusional" and states we could get the information we needed from the man she spoke to yesterday (referring to telepsych). When asked about appetite, pt states she has been eating normally and "will probably eat this entire plate, I'm so hungry". Per ED staff, pt has been eating without difficulty and demonstrating normal appetite since arrival.     UA demonstrates UTI. AB started     PLAN:     - Pt presentation consistent with delirium in the setting of UTI which can present with psychotic symptoms and is intermittent.  - Pt does not currently meet criteria for inpatient admission. Acute presentation likely secondary to UTI or Delirium rather than psychotic process.   Can be discharge to [facility] and f/u with psych provide  Continue out Pt meds per out Pt provider:  Remeron 15mg qhs, Depakote 500 BID, risperidone 1mg BID     NPP attestation:  I have personally seen and examined the patient. I fully participated in the care of this patient. I hae made amendments to the documentation where necessary, and agree with the history, physical exam, and plan as documented by the student.  John

## 2024-10-31 NOTE — ED CDU PROVIDER SUBSEQUENT DAY NOTE - NS ED MD CDU HISTORY DATE TIME DT
Future Appointments  Date Time Provider Department Center   9/28/2017 1:15 PM Pia Mills MD [facility] PEDS [facility]   10/11/2017 3:00 PM NURSE, YADIEL CHAUDHARY [facility] MICHAEL BLDG   12/21/2017 9:20 AM [facility]        31-Oct-2024 07:59

## 2024-10-31 NOTE — ED BEHAVIORAL HEALTH PROGRESS NOTE - DETAILS
Rossy Pt will return to ED if SI or evidence of psychosis of mental disorder not attributable to underlying medical disorder.

## 2024-10-31 NOTE — ED BEHAVIORAL HEALTH PROGRESS NOTE - SUMMARY
53y/o single Female, domiciled at [facility] x2 years, per fernanda has diagnoses of Unspecified bipolar, MDD, neurocog, unspecified psychosis, delirium, schizophrenia, no inpatient psych admissions per fernanda, in treatment with psychiatrist at [facility] W/ Dr. Grajeda q week on Fridays, no known suicide attempts, no nssib, hx of crack cocaine abuse, and pmhx of  DM, HTN, CVA, dysarthrias and diplopia, large left MCA territory infarct BIBA from [facility] for reportedly not eating / drinking / taking medications for the last one month.    On assessment, pt is received in stretcher awake, alert, calm, somewhat confused with telepsychiatry concept but engages with writer. She has expressive aphasia  and is able to answer certain qs when given multiple options. She is oriented to self, knows she's in the hospital, says the date is Oct 30, 2026, believes she came in from home. Does not know why she is here.     She is currently denying any acute sxs of depression, haroon, and psychosis including SI/HI/AVH. She states her communication with [place] is limited to when she is praying and gets his messages and denies any gerardo AVH in general or relating to Hinduism. Her thought process is mostly linear given what could be assessed given her expressive aphasia. She does not appear internally preoccupied, or responding to internal stimuli. Per staff pt ate a sandwich in the ED and pt confirms she has been eating although she knows [place] wouldn't want her to. Per [facility] pt has been going to a couple EDs in the last month and medically admitted (alen missyken was most recently admitted to [facility] for UTI x 3 days), after which pt returned back to [facility] at baseline. [facility] nursing coordinator of pt's unit will be back in AM for more specific info about tonight's presentation.    PLAN:  - Safety: Keep pt on 1:1 in the ED  - Labs: UA, Utox, TSH, b12, folate, can consider CTH   - Medications: Would resume pt's psychotropics   Remeron 15mg qhs, depakote 500 BID, risperidone 1mg BID   agitation: Risperdal 0.5mg po q6h PRN   anxiety: ativan 0.5mg po q6h PRN   - Dispo: Pt is NOT psychiatrically cleared, pending collateral information from facility  May consider medical admission for further work up / management with [facility] psych to follow 53y/o single Female, [facility], per fernanda has diagnoses of Unspecified bipolar, MDD, neurocog, unspecified psychosis, delirium, schizophrenia, no inpatient psych admissions per fernanda, in treatment with psychiatrist at [facility] W/ Dr. Grajeda q week on Fridays, no known suicide attempts, no nssib, hx of crack cocaine abuse, and pmhx of  DM, HTN, CVA, dysarthrias and diplopia, large left MCA territory infarct [facility] for reportedly not eating / drinking / taking medications for the last one month.    On assessment, pt is received in stretcher awake, alert, calm, somewhat confused with telepsychiatry concept but engages with writer. She has expressive aphasia  and is able to answer certain qs when given multiple options. She is oriented to self, knows she's in the hospital, says the date is Oct 30, 2026, believes she came in from home. Does not know why she is here.     She is currently denying any acute sxs of depression, haroon, and psychosis including SI/HI/AVH. She states her communication with [place] is limited to when she is praying and gets his messages and denies any gerardo AVH in general or relating to Confucianism. Her thought process is mostly linear given what could be assessed given her expressive aphasia. She does not appear internally preoccupied, or responding to internal stimuli. Per staff pt ate a sandwich in the ED and pt confirms she has been eating although she knows [place] wouldn't want her to. Per collateral pt has been going to a couple EDs in the last month and medically admitted (alen missyken was most recently admitted to [facility] for UTI x 3 days), after which pt returned back to [facility] at baseline. [facility] nursing coordinator of pt's unit will be back in AM for more specific info about tonight's presentation.    PLAN:  - Safety: Keep pt on 1:1 in the ED  - Labs: UA, Utox, TSH, b12, folate, can consider CTH   - Medications: Would resume pt's psychotropics   Remeron 15mg qhs, depakote 500 BID, risperidone 1mg BID   agitation: Risperdal 0.5mg po q6h PRN   anxiety: ativan 0.5mg po q6h PRN

## 2024-10-31 NOTE — ED ADULT NURSE REASSESSMENT NOTE - NS ED NURSE REASSESS COMMENT FT1
Assuming care from GIGI Babcock, plan of care, reason for hospital visit were reviewed, introduced to patient, updated patient on plan of care. Education not successful after pt is not able to accurately define the plan of care, pt does understand she is in the hospital and knows she is receiving treatment, VS recorded in the EMR.
Pt is AOX4 resting comfortably in bed. Pt has no complaints at this time. NAD. Pt updated on the plan of care and verbalizes understanding. Awaiting ambulance .
Pt resting comfortably in stretcher, pt awake and confused, resp even and unlabored on RA, pt placed on virtual OBS for psych, pt administered morning meds per MD orders, pt offers no complaints at this time, pt educated about plan of care, pt demonstrates understanding through use of teach back method, safety maintained.
Pt resting in bed. No complaints at this time. Denies nausea, vomiting, headache, chest pain, SOB, fevers, chills. NAD. Pt updated on the plan of care. Awaiting further orders.
pt baseline mental status. Vitals WNL. Completed telepsych visit. Resting in stretcher at lowest position side rails up.

## 2024-10-31 NOTE — ED BEHAVIORAL HEALTH PROGRESS NOTE - RISK ASSESSMENT
Risk: Cognitive limitations/difficulty expressing emotions, non compliant w/ tx, limited insight,   protective: Denying si/hi/avh, no known inpatient admissions, no known suicide attempts, no nssib, residential stability, sobriety, in treatment at [facility],

## 2024-10-31 NOTE — ED CDU PROVIDER DISPOSITION NOTE - PATIENT PORTAL LINK FT
You can access the FollowMyHealth Patient Portal offered by [facility] by registering at the following website: [email or web address]. By joining [facility]’s FollowMyHealth portal, you will also be able to view your health information using other applications (apps) compatible with our system.

## 2024-11-05 LAB
-  AMPICILLIN/SULBACTAM: SIGNIFICANT CHANGE UP
-  AMPICILLIN: SIGNIFICANT CHANGE UP
-  AZTREONAM: SIGNIFICANT CHANGE UP
-  CEFAZOLIN: SIGNIFICANT CHANGE UP
-  CEFEPIME: SIGNIFICANT CHANGE UP
-  CEFTRIAXONE: SIGNIFICANT CHANGE UP
-  CEFUROXIME: SIGNIFICANT CHANGE UP
-  CIPROFLOXACIN: SIGNIFICANT CHANGE UP
-  ERTAPENEM: SIGNIFICANT CHANGE UP
-  GENTAMICIN: SIGNIFICANT CHANGE UP
-  IMIPENEM: SIGNIFICANT CHANGE UP
-  LEVOFLOXACIN: SIGNIFICANT CHANGE UP
-  MEROPENEM: SIGNIFICANT CHANGE UP
-  NITROFURANTOIN: SIGNIFICANT CHANGE UP
-  PIPERACILLIN/TAZOBACTAM: SIGNIFICANT CHANGE UP
-  TOBRAMYCIN: SIGNIFICANT CHANGE UP
-  TRIMETHOPRIM/SULFAMETHOXAZOLE: SIGNIFICANT CHANGE UP
CULTURE RESULTS: ABNORMAL
METHOD TYPE: SIGNIFICANT CHANGE UP
ORGANISM # SPEC MICROSCOPIC CNT: ABNORMAL
ORGANISM # SPEC MICROSCOPIC CNT: SIGNIFICANT CHANGE UP
SPECIMEN SOURCE: SIGNIFICANT CHANGE UP

## 2025-01-27 NOTE — DISCHARGE NOTE ADULT - FUNCTIONAL SCREEN CURRENT LEVEL: BATHING, MLM
2 = assistive person Detail Level: Simple Additional Notes: Upon exam, symptoms are suggestive of seborrheic dermatitis vs. eczema (doubt). Pt has been treating her symptoms with hydrocortisone cream 2.5%, with mild relief. Offered the pt Promiseb vs. tacrolimus to be used in addition to the hydrocortisone. Pt denies additional prescriptions at this time. Render Risk Assessment In Note?: no Additional Notes: Pt complains of dry and thinned skin. Recommended that she use OTC Amlactin to soften the skin, but instructed her to use increased sun protection as it can cause sun sensitivity.

## 2025-02-06 NOTE — ED PROVIDER NOTE - TOBACCO USE
[facility]    CHIEF COMPLAINT: No chief complaint on file.      HPI:  Elva Lee is a 62 y.o. female with a past medical history of hypertension, hyperlipidemia, prediabetes, ILA not on CPAP.    She presents today in consultation for high blood pressure. Reports being diagnosed in her 40s. She reports not taking medications consistently. She does not know what dose of Losartan she is taking. Reports having ankle swelling with amlodipine, atenolol, lisinopril.    She recently started taking Rosuvastatin more consistently but believes she had headaches due to this.    Diagnosed with sleep apnea 3-4 years ago but didn't want to start using CPAP.  Drinks 2 old fashioned daily and diet is high in processed foods.  Processed foods    Denies lightheadedness, syncope, chest pain, palpitations, orthopnea, PND, lower extremity swelling.  -Occasionally blurry vision   -Uses Advair for shortness of breath hx Asthma     Social history: Non smoker.   Family history: Father had congestive heart failure. No premature CAD in first degree relatives.      The following portions of the patient's history were reviewed and updated as appropriate: allergies, current medications, past family history, past medical history, past social history, past surgical history, and problem list.    SINCE LAST OV I REVIEWED WITH THE PATIENT THE INTERIM LABS, TEST RESULTS, CONSULTANT(S) NOTES AND PERFORMED AN INTERIM REVIEW OF HISTORY    Past Medical History:   Diagnosis Date    Asthma     Heartburn     Hypertension        Past Surgical History:   Procedure Laterality Date    HYSTERECTOMY      Feb 2021       History reviewed. No pertinent family history.    Allergies   Allergen Reactions    Amlodipine Swelling     Ankle edema    Atenolol Swelling    Lisinopril Swelling     Ankle edema       Current Outpatient Medications   Medication Sig Dispense Refill    albuterol (PROVENTIL HFA,VENTOLIN HFA) 90 mcg/act inhaler Inhale 2  "puffs every 4 (four) hours as needed for shortness of breath 18 g 1    buPROPion (WELLBUTRIN XL) 150 mg 24 hr tablet Take 1 tablet by mouth every morning      Fluticasone-Salmeterol (Advair Diskus) 250-50 mcg/dose inhaler Inhale 1 puff 2 (two) times a day Rinse mouth after use. 60 blister 11    losartan (COZAAR) 100 MG tablet Take 100 mg by mouth daily      multivitamin IVPB Take 1 tablet by mouth in the morning      fluticasone (FLONASE) 50 mcg/act nasal spray 1 spray into each nostril daily (Patient not taking: Reported on 2/6/2025)      losartan (COZAAR) 25 mg tablet       rosuvastatin (CRESTOR) 5 mg tablet Take 1 tablet by mouth in the morning (Patient not taking: Reported on 2/6/2025)       No current facility-administered medications for this visit.       BP (!) 186/104 (BP Location: Left arm, Patient Position: Sitting, Cuff Size: Adult)   Pulse 84   Temp 98 °F (36.7 °C) (Tympanic)   Ht 5' 5.5\" (1.664 m)   Wt 91.1 kg (200 lb 12.8 oz)   LMP  (LMP Unknown)   SpO2 97%   BMI 32.91 kg/m²     Review of Systems   All other systems reviewed and are negative.      Physical Exam  Vitals reviewed.   Constitutional:       General: She is not in acute distress.     Appearance: Normal appearance. She is well-developed. She is not toxic-appearing.   HENT:      Head: Normocephalic and atraumatic.   Eyes:      General: No scleral icterus.     Extraocular Movements: Extraocular movements intact.      Conjunctiva/sclera: Conjunctivae normal.   Cardiovascular:      Rate and Rhythm: Normal rate and regular rhythm.      Pulses: Normal pulses.      Heart sounds:      No gallop.   Pulmonary:      Effort: Pulmonary effort is normal. No respiratory distress.      Breath sounds: Normal breath sounds. No wheezing or rales.   Abdominal:      General: Abdomen is flat. Bowel sounds are normal. There is no distension.      Palpations: Abdomen is soft.      Tenderness: There is no abdominal tenderness.   Musculoskeletal:      Right " lower leg: No edema.      Left lower leg: No edema.   Skin:     General: Skin is warm and dry.      Capillary Refill: Capillary refill takes less than 2 seconds.      Coloration: Skin is not jaundiced or pale.   Neurological:      Mental Status: She is alert.   Psychiatric:         Mood and Affect: Mood normal.          Lab Results   Component Value Date    K 4.0 12/13/2024     12/13/2024    CO2 28 12/13/2024    BUN 16 12/13/2024    CREATININE 0.87 12/13/2024    CALCIUM 9.0 12/13/2024    ALT 46 08/19/2024    AST 22 08/19/2024     Lab Results   Component Value Date     08/19/2024    HGBA1C 5.8 (H) 08/19/2024       Lab Results   Component Value Date    TSH 1.27 08/19/2024       Cardiac studies:   Results for orders placed or performed in visit on 02/06/25   POCT ECG    Impression    Normal sinus rhythm         ASSESSMENT AND PLAN:  Prediabetes: Last hemoglobin A1c 5.8%.  Currently recommend weight loss and moderate intensity physical activity.    Hyperlipidemia: Lipid panel from 8/2024 shows total cholesterol 251, triglycerides 261, HDL 55, . Recommend resumption of rosuvastatin 5 mg.    Hypertension: Chem panel from 12/2024 BUN 16, creatinine 0.87, sodium 144, potassium 4.0, chloride 106, EGFR 74.  Current medication regimen includes Losartan 100 mg. Reports having ankle swelling with amlodipine, atenolol, lisinopril.  -Discontinue Losartan and begin Olmesartan-HCTZ  -Treatment of ILA recommended  -Moderate intensity physical activity at least 150 minutes/week is recommended  -Low-sodium diet, ideally less than 1.8 g/day  -Weight loss  -BP check in 4 weeks    Yehuda Cannon MD     Unknown if ever smoked

## 2025-02-10 NOTE — PATIENT PROFILE ADULT - PRIMARY ROLES/RESPONSIBILITIES
What Type Of Note Output Would You Prefer (Optional)?: Bullet Format Hpi Title: Evaluation of Skin Lesions none

## 2025-04-14 ENCOUNTER — RX ONLY (RX ONLY)
Age: 55
End: 2025-04-14

## 2025-04-14 ENCOUNTER — OFFICE (OUTPATIENT)
Dept: URBAN - METROPOLITAN AREA CLINIC 94 | Facility: CLINIC | Age: 55
Setting detail: OPHTHALMOLOGY
End: 2025-04-14
Payer: MEDICAID

## 2025-04-14 DIAGNOSIS — H11.153: ICD-10-CM

## 2025-04-14 DIAGNOSIS — I63.50: ICD-10-CM

## 2025-04-14 DIAGNOSIS — H25.13: ICD-10-CM

## 2025-04-14 DIAGNOSIS — H40.1134: ICD-10-CM

## 2025-04-14 PROCEDURE — 92250 FUNDUS PHOTOGRAPHY W/I&R: CPT | Performed by: PHYSICIAN ASSISTANT

## 2025-04-14 PROCEDURE — 92004 COMPRE OPH EXAM NEW PT 1/>: CPT | Performed by: PHYSICIAN ASSISTANT

## 2025-04-14 ASSESSMENT — REFRACTION_AUTOREFRACTION
OD_SPHERE: +1.00
OS_AXIS: 095
OS_CYLINDER: -2.00
OD_AXIS: 060
OD_CYLINDER: -1.50
OS_SPHERE: +2.00

## 2025-04-14 ASSESSMENT — KERATOMETRY
OS_K2POWER_DIOPTERS: 42.50
OS_AXISANGLE_DEGREES: 001
OD_K2POWER_DIOPTERS: 42.25
OD_AXISANGLE_DEGREES: 168
OD_K1POWER_DIOPTERS: 41.75
OS_K1POWER_DIOPTERS: 41.25

## 2025-04-14 ASSESSMENT — VISUAL ACUITY
OS_BCVA: 20/30+1
OD_BCVA: 20/70-1

## 2025-04-14 ASSESSMENT — TONOMETRY
OD_IOP_MMHG: 16
OD_IOP_MMHG: 20
OS_IOP_MMHG: 21
OS_IOP_MMHG: 18

## 2025-04-14 ASSESSMENT — PACHYMETRY
OS_CT_UM: 560
OD_CT_UM: 550
OD_CT_CORRECTION: -1
OS_CT_CORRECTION: -1

## 2025-04-14 ASSESSMENT — CONFRONTATIONAL VISUAL FIELD TEST (CVF)
OS_FINDINGS: FULL
OD_FINDINGS: FULL

## 2025-05-20 ENCOUNTER — OFFICE (OUTPATIENT)
Dept: URBAN - METROPOLITAN AREA CLINIC 94 | Facility: CLINIC | Age: 55
Setting detail: OPHTHALMOLOGY
End: 2025-05-20
Payer: MEDICAID

## 2025-05-20 DIAGNOSIS — H25.13: ICD-10-CM

## 2025-05-20 PROBLEM — H52.4 PRESBYOPIA: Status: ACTIVE | Noted: 2025-05-20

## 2025-05-20 PROCEDURE — 99212 OFFICE O/P EST SF 10 MIN: CPT | Performed by: OPTOMETRIST

## 2025-05-20 ASSESSMENT — VISUAL ACUITY
OS_BCVA: 20/25
OD_BCVA: 20/30

## 2025-05-20 ASSESSMENT — PACHYMETRY
OS_CT_CORRECTION: -1
OS_CT_UM: 560
OD_CT_CORRECTION: -1
OD_CT_UM: 550

## 2025-05-20 ASSESSMENT — REFRACTION_AUTOREFRACTION
OS_SPHERE: +0.50
OS_CYLINDER: -1.00
OD_AXIS: 090
OS_AXIS: 125
OD_SPHERE: +1.00
OD_CYLINDER: -1.25

## 2025-05-20 ASSESSMENT — REFRACTION_MANIFEST
OS_VA1: 20/25
OS_ADD: +1.50
OD_VA1: 20/20
OD_SPHERE: PLANO
OD_AXIS: 090
OD_CYLINDER: -1.00
OS_SPHERE: PLANO
OD_ADD: +1.50
OS_AXIS: 125
OS_CYLINDER: -0.75

## 2025-05-20 ASSESSMENT — KERATOMETRY
OS_AXISANGLE_DEGREES: 005
OS_K1POWER_DIOPTERS: 40.75
OD_K2POWER_DIOPTERS: 42.75
OD_AXISANGLE_DEGREES: 005
OS_K2POWER_DIOPTERS: 43.00
OD_K1POWER_DIOPTERS: 42.00

## 2025-08-04 NOTE — ED ADULT NURSE NOTE - NIH STROKE SCALE: 4. FACIAL PALSY, QM
Neurosurgery  Established Patient    SUBJECTIVE:     History of Present Illness:  50 F who vapes presents for eval of progressively worsening LLE radicular pain. Pain initially started as left hip pain about 1 year ago and then progressed to left hip and buttock pain. More recently she has developed pain which radiates down her left leg into lateral calf. She also has noticed some mild weakness in her left foot. She has mild to moderate back pain with heavy lifting but overall her LLE pain is more severe. She tried a few rounds of PT and hasn't had any spinal injections.     Interval fu 8/4/25:  Pt presents in fu.  She underwent left sided L4,5 TFESIs in late June which gave her one week of pain relief.  She then travelled to [place] where she did a lot of walking which caused her leg pain to return.  She was however, able to walk for about 1/2 pain free.  Today she has no leg pain.    Review of patient's allergies indicates:  No Known Allergies    Current Medications[1]    Past Medical History:   Diagnosis Date    Endometriosis     Fatty liver     Hypertension      Past Surgical History:   Procedure Laterality Date    APPENDECTOMY      BREAST BIOPSY Left     benign    COLONOSCOPY N/A 01/26/2024    Procedure: COLONOSCOPY;  Surgeon: Abi Mena MD;  Location: [facility] ENDO;  Service: Endoscopy;  Laterality: N/A;  10/9 ref by Sue Hansen MD, PEG, instr. to portal-st  1/19- lvm and portal msg for pc. DBM    EYE SURGERY      INJECTION, SPINE, LUMBOSACRAL, TRANSFORAMINAL APPROACH Left 6/25/2025    Procedure: Left L4 + L5 Transforaminal Epidural Steroid Injections (ref: Hanyu);  Surgeon: Candelario Suarez Jr., MD;  Location: [facility] PAIN MANAGEMENT;  Service: Pain Management;  Laterality: Left;  @1315(given)  No ATC or DM  Needs Consent    LAPAROSCOPIC APPENDECTOMY N/A 04/13/2023    Procedure: APPENDECTOMY, LAPAROSCOPIC;  Surgeon: Niall Benjamin MD;  Location: [facility] OR;  Service: General;  Laterality: N/A;    LASIK  Bilateral     TOTAL ABDOMINAL HYSTERECTOMY W/ BILATERAL SALPINGOOPHORECTOMY  2020    Supracervical - done in [facility]     Family History       Problem Relation (Age of Onset)    Arthritis Mother    Asthma Mother    COPD Mother    Heart disease Father    Heart failure Mother    Hypertension Mother          Social History     Socioeconomic History    Marital status:    Tobacco Use    Smoking status: Every Day     Types: Vaping with nicotine    Smokeless tobacco: Never   Substance and Sexual Activity    Alcohol use: Yes     Alcohol/week: 2.0 standard drinks of alcohol     Types: 2 Glasses of wine per week     Comment: daily    Drug use: Never   Social History Narrative    ** Merged History Encounter **          Social Drivers of Health     Financial Resource Strain: Low Risk  (2/5/2025)    Overall Financial Resource Strain (CARDIA)     Difficulty of Paying Living Expenses: Not hard at all   Food Insecurity: No Food Insecurity (2/5/2025)    Hunger Vital Sign     Worried About Running Out of Food in the Last Year: Never true     Ran Out of Food in the Last Year: Never true   Transportation Needs: No Transportation Needs (1/11/2024)    PRAPARE - Transportation     Lack of Transportation (Medical): No     Lack of Transportation (Non-Medical): No   Physical Activity: Inactive (2/5/2025)    Exercise Vital Sign     Days of Exercise per Week: 0 days     Minutes of Exercise per Session: 0 min   Stress: Stress Concern Present (2/5/2025)    [employer] - Occupational Stress Questionnaire     Feeling of Stress : Very much   Housing Stability: Low Risk  (1/11/2024)    Housing Stability Vital Sign     Unable to Pay for Housing in the Last Year: No     Number of Places Lived in the Last Year: 1     Unstable Housing in the Last Year: No       Review of Systems  14 point ROS was negative    OBJECTIVE:     Vital Signs  Pain Score: 0-No pain  There is no height or weight on file to calculate  BMI.    Neurosurgery Physical Exam  Constitutional: She appears well-developed and well-nourished.      Eyes: Pupils are equal, round, and reactive to light.      Cardiovascular: Normal rate and regular rhythm.      Abdominal: Soft.      Psych/Behavior: She is alert. She is oriented to person, place, and time. She has a normal mood and affect.      Musculoskeletal: Gait is normal.        Neck: Range of motion is limited.        Back: Range of motion is limited.      Neurological:        Coordination: She has a normal Romberg Test and normal tandem walking coordination.        Sensory: There is no sensory deficit in the trunk. There is no sensory deficit in the extremities.        DTRs: DTRs are DTRS NORMAL AND SYMMETRICnormal and symmetric.        Cranial nerves: Cranial nerve(s) II, III, IV, V, VI, VII, VIII, IX, X, XI and XII are intact.      5/5 in BUE  5/5 in BLE      No chang's bilaterally     Negative SHARIF at left hip    Diagnostic Results:  MRI L: severe left L4 NF stenosis, mild to moderate left L5 NF stenosis.  No high grade central canal stenosis.  Reviewed    Flex/ex L: no instability  Scoli: lumbar levoscoliosis, no mismatch/imbalance  CT L: severe left L4/5 NF stenosis.  Vacuum disc at L3/4, 4/5.    ASSESSMENT/PLAN:     50 F presents for eval of left buttock/hip pain and left LE radicular pain in L5 distribution. Her imaging is described above which is notable for severe left L4 NF stenosis.  I discussed further treatment options and if she pursues another injection I would like her to just get L4 level if possible.  I also discussed that a short segment decompression/fusion is possible should her pain return following another injection but, that ultimately she may require a deformity correction in the future.             [1]   Current Outpatient Medications   Medication Sig Dispense Refill    azelastine (ASTELIN) 137 mcg (0.1 %) nasal spray 1 spray (137 mcg total) by Nasal route 2 (two) times daily.  30 mL 1    dextroamphetamine-amphetamine (ADDERALL XR) 20 MG 24 hr capsule Take 1 capsule (20 mg total) by mouth 2 (two) times a day. 60 capsule 0    EScitalopram oxalate (LEXAPRO) 20 MG tablet TAKE ONE TABLET BY MOUTH ONCE DAILY FOR MOOD STRENGTH: 20 MG 90 tablet 1    fluticasone propionate (FLONASE) 50 mcg/actuation nasal spray SPRAY 2 SPRAYS BY EACH NOSTRIL ROUTE ONCE DAILY. 16 mL 1    hydroCHLOROthiazide (HYDRODIURIL) 25 MG tablet TAKE ONE TABLET BY MOUTH EVERY DAY FOR FLUID AND BLOOD PRESSURE 90 tablet 1    lisinopriL (PRINIVIL,ZESTRIL) 40 MG tablet TAKE 1 TABLET BY MOUTH EVERY DAY 90 tablet 1    MULTIVITAMIN ORAL Take by mouth.      nabumetone (RELAFEN) 750 MG tablet TAKE 1 TABLET BY MOUTH 2 TIMES DAILY. 60 tablet 1    pregabalin (LYRICA) 50 MG capsule Take 1 capsule (50 mg total) by mouth every evening. 30 capsule 2    traMADoL (ULTRAM) 50 mg tablet Take 1 tablet (50 mg total) by mouth every 6 (six) hours as needed for Pain. 30 each 0     No current facility-administered medications for this visit.      (1) Minor paralysis (flattened nasolabial fold, asymmetry on smiling)